# Patient Record
Sex: MALE | Race: WHITE | NOT HISPANIC OR LATINO | Employment: OTHER | URBAN - METROPOLITAN AREA
[De-identification: names, ages, dates, MRNs, and addresses within clinical notes are randomized per-mention and may not be internally consistent; named-entity substitution may affect disease eponyms.]

---

## 2017-08-15 ENCOUNTER — TELEPHONE (OUTPATIENT)
Dept: PHARMACY | Facility: OTHER | Age: 72
End: 2017-08-15

## 2017-08-15 ENCOUNTER — TELEPHONE (OUTPATIENT)
Dept: FAMILY MEDICINE | Facility: CLINIC | Age: 72
End: 2017-08-15

## 2017-08-15 NOTE — TELEPHONE ENCOUNTER
MTM referral from: [facility]     MTM referral outreach attempt #1 on August 15, 2017 at 1:01 PM      Outcome: Patient not reachable contact information is incorrect, will route to MTM Pharmacist/Provider as an FYI. Thank you for the referral.    Diana Viramontes, MTM Coordinator

## 2017-08-18 NOTE — TELEPHONE ENCOUNTER
Aida from [facility] gave us correct patient information unfortunately there medical insurance does not cover MTM.  Diana Viramontes, [facility] Coordinator

## 2018-04-16 ENCOUNTER — TRANSFERRED RECORDS (OUTPATIENT)
Dept: HEALTH INFORMATION MANAGEMENT | Facility: CLINIC | Age: 73
End: 2018-04-16

## 2018-04-24 ENCOUNTER — TRANSFERRED RECORDS (OUTPATIENT)
Dept: HEALTH INFORMATION MANAGEMENT | Facility: CLINIC | Age: 73
End: 2018-04-24

## 2018-04-30 ENCOUNTER — TRANSFERRED RECORDS (OUTPATIENT)
Dept: HEALTH INFORMATION MANAGEMENT | Facility: CLINIC | Age: 73
End: 2018-04-30

## 2018-07-24 ENCOUNTER — HOSPITAL ENCOUNTER (OUTPATIENT)
Dept: OCCUPATIONAL THERAPY | Facility: CLINIC | Age: 73
Setting detail: THERAPIES SERIES
End: 2018-07-24
Attending: PSYCHIATRY & NEUROLOGY
Payer: MEDICARE

## 2018-07-24 PROCEDURE — 97535 SELF CARE MNGMENT TRAINING: CPT | Mod: GO | Performed by: OCCUPATIONAL THERAPIST

## 2018-07-24 PROCEDURE — 97166 OT EVAL MOD COMPLEX 45 MIN: CPT | Mod: GO | Performed by: OCCUPATIONAL THERAPIST

## 2018-07-24 PROCEDURE — G8987 SELF CARE CURRENT STATUS: HCPCS | Mod: GO,CL | Performed by: OCCUPATIONAL THERAPIST

## 2018-07-24 PROCEDURE — 97165 OT EVAL LOW COMPLEX 30 MIN: CPT | Mod: GO | Performed by: OCCUPATIONAL THERAPIST

## 2018-07-24 PROCEDURE — 40000249 ZZH STATISTIC VISIT LOW VISION CLINIC: Performed by: OCCUPATIONAL THERAPIST

## 2018-07-24 PROCEDURE — G8988 SELF CARE GOAL STATUS: HCPCS | Mod: GO,CK | Performed by: OCCUPATIONAL THERAPIST

## 2018-07-24 ASSESSMENT — ACTIVITIES OF DAILY LIVING (ADL)
PRIOR_ADL/IADL_STATUS: IMPAIRED PRIOR TO ONSET
ADL_EQUIPMENT_USED: ADAPTIVE ADL EQUIPMENT;GRAB BAR;TUB/SHOWER CHAIR

## 2018-07-24 ASSESSMENT — VISUAL ACUITY: OS: LIGHT PERCEPTION

## 2018-07-25 NOTE — PROGRESS NOTES
" 07/24/18 1100   Visit Type   Type of Visit Initial       Present No   General Information   Start Of Care Date 07/24/18   Referring Physician Becki Marinelli MD   Orders Evaluate And Treat As Indicated  (for low vision)   Date of Order 04/24/18   Medical Diagnosis Glaucoma, legally blind, physiological tremors, diabetes   Onset Of Illness/injury Or Date Of Surgery 04/24/2018   Surgical/Medical history reviewed (h/o CVA,  diabetes, glaucoma, dizziness, neuropathy, tremors)   Precautions/Limitations history of stroke affected left side, hard of hearing, h/o bypass,  mechanical valve placement, physiological tremor, wheelchair and cane use for ambulation   Additional Occupational Profile Info/Pertinent History of Current Problem has been seen by [facility], - issued flashlight, VI button, large print playing cards,   Prior ADL/IADL status Impaired prior to onset   Others present at visit Spouse/significant other  ([place])   Patient/family Goals Statement reading, seeing distance, pt states he would like to drive again   General information comments Marco Mendoza [phone] [facility]   Social History/Home Environment   Living Environment House/townhome  (main floor living. Has lower level - pt does not need access)   Current Community Support (hires lawn and snow, [facility] \"checks in\" on things)   Patient Role/employment History  Retired  (was )   Avocational \"I sit in my recliner.  I can't really do anything\". 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    Pain Assessment   Pain Reported Yes   Pain Location knee   Pain Scale 8/10  (8)   Comments Pt has been evaluated for knee pain. States he is too high risk for surgical replacement.    Fall Risk Screen   Fall screen " "completed by OT   Have you fallen 2 or more times in the past year? Yes   Have you fallen and had an injury in the past year? No   Is patient a fall risk? Yes   Fall screen comments is currently being seen per PT for dizziness and balance   Cognitive/Behavioral   Communication Intact   Cognitive Status (decreased memory, concentration)   Behavior Appropriate   Patient/family aware of diagnosis Yes   How well do you understand your eye condition? Not well   Vision related restrictions on visiting with family/friends Mild   Reported emotional impact of visual impairment Severe   Cognitive/Behavioral Comment is being treated for depression   Physical Status/Equipment   Physical Status Tremor;Impaired balance   Mobility equipment used Walker   ADL Equipment used Adaptive ADL Equipment;Grab bar;Tub/shower chair   Physical Status/Equipment comments Pt reports he ambulates independently to the bathroom.  He receives assist for bathing, dressing, grooming and eating.  Pt's wife does all cooking. Pt does look for items in refrigerator and has difficulty location items secondary to vision.    Visual Report   Functional Complaints Reading;Writing;Safety in mobility   Visual Complaints Constricted visual fields right eye;Constricted visual fields left eye   Nishant Bonnet Symptoms? Yes   Magnifier (strength and type) does not have   Reading glasses Bifocal   Lighting and Glare   Is your lighting adequate? No/ at home   Is glare a problem? Yes/ outdoors   Visual Acuity   Acuity right eye not available to this therapist. Ophthalmology notes have been requested. Pt reports \"poor\"   Acuity left eye Light Perception    Contrast Sensitivity   Contrast sensitivity (score/25) 14/25   MN Read   Smallest print size read 1.0M   Critical print size 2.0M   Words per minute at critical print size 60   Functional Reading Screen   Reading screen comments SRAVFP not administed secondary to time constraints. Administration of evaluation slowed " "secondary to pt's slowed responses    Dynavision: Evaluation of visual skills/search of extra personal space via 5X4 foot computerized light board with 64 stimuli.  The user reacts as quickly as possible by striking the lights as they turn on in random succession.   Dynavision Cascade (not performed secondary to time constraints)   Education   Learner Patient;Significant Other  (wife Roxane)   Readiness Acceptance   Method Explanation;Demonstration   Response Verbalizes understanding;Needs reinforcement   Education Notes provided pt education in irreversible vision loss caused by Glaucoma. Pt stated \"no one has ever explained that to me before\"   Clinical Impression, OT Eval   Criteria for Skilled Therapeutic Interventions Met yes;treatment indicated   Therapy  Diagnosis: Impaired ADL/IADL with deficits in Reading based ADL;Written communication;Home management;Dressing;Grooming;Eating   Assessment of Occupational Performance 5 or more Performance Deficits   Identified Performance Deficits as above   Clinical Decision Making (Complexity) Moderate complexity   Clinical Impression Comments pt with mulitple co-moribities impact participation, new learning and adoption of new methods   OT Visual Rehabilitation Evaluation Plan   Therapy Plan Occupational therapy intervention   Planned Interventions Visual field loss awareness;Visual skills training for near tasks;Visual skills training for safety in mobility;Visual skills training for location of objects;Low vision compensatory training for reading;Low vision compensatory training for written communication;Low vision compensatory training for object identification;Instruction in environmental adaptations for glare;Instruction in environmental adaptations for contrast;Instruction in environmental adaptations for lighting;Optical device/ADL device instruction and training;Instruction in community resources   Frequency / Duration 5 tx visits over 12 weeks - 1X every 2-3 " weeks for 12 weeks   Risks and Benefits of Treatment have been explained. Yes   Patient, Family in agreement with plan of care Yes   GOALS   Goals Near Vision;Visual Field;Environmental Modification;Resource Education;Distance Viewing   Goals Addressed this Session Near vision;Visual field   Goal 1 - Near Vision   Goal Description: Near Vision Patient will verbalize awareness of visual field Loss and demonstrate improved use of visual skills/adaptive equipment for increased independence in reading-based activities of daily living, written communication and detail ADL tasks.   Target Date 10/16/18   Goal 2 - Visual field   Goal Description: Visual field Patient will verbalize awareness of visual field loss and demonstrate improved use of visual skills for increased safety/ independence in locating objects/obstacles and in navigation   Target Date 10/16/18   Goal 3 - Environmental modification   Goal Description: Environment modification Patient will demonstrate understanding of the impact of lighting, contrast and glare on ADL activities, in conjunction with environmentally-based ADL modifications   Target Date 10/16/18   Goal 4 - Resource education   Goal Description: Resource education Patient will verbalize awareness of community resources for the following:;Access to large print materials;Access to low vision devices   Target Date 10/16/18   Goal 5 - Distance viewing   Goal Description: Distance viewing Patient will demonstrate proficient use of a distance device in conjunction with appropriate visual skills techniques to correctly survey objects in the distance   Target Date 10/16/18   Total Evaluation Time   Total Evaluation Time 50   Therapy Certification   Certification date from 07/25/18   Certification date to 10/16/18   Medical Diagnosis Glaucoma

## 2018-07-25 NOTE — PROGRESS NOTES
[facility]          OUTPATIENT OCCUPATIONALTHERAPY  EVALUATION  PLAN OF TREATMENT FOR OUTPATIENT REHABILITATION  (COMPLETE FOR INITIAL CLAIMS ONLY)  Patient's Last Name, First Name, M.I.  YOB: 1945  CeceChase      Provider's Name  [facility] Medical Record No.  [id number]   Onset Date:  04/24/2018 Start of Care Date:  07/24/18   Type:     ___PT  _X_OT   ___SLP Medical Diagnosis:  Glaucoma   Therapy Diagnosis: Impaired ADL/IADL with deficits in Reading based ADL, Written communication, Home management, Dressing, Grooming, Eating    Visits from SOC: 1     _________________________________________________________________________________  Plan of Treatment/Functional Goals:  Planned Interventions: Visual field loss awareness, Visual skills training for near tasks, Visual skills training for safety in mobility, Visual skills training for location of objects, Low vision compensatory training for reading, Low vision compensatory training for written communication, Low vision compensatory training for object identification, Instruction in environmental adaptations for glare, Instruction in environmental adaptations for contrast, Instruction in environmental adaptations for lighting, Optical device/ADL device instruction and training, Instruction in community resources        Goals  1. Patient will verbalize awareness of visual field Loss and demonstrate improved use of visual skills/adaptive equipment for increased independence in reading-based activities of daily living, written communication and detail ADL tasks.         Target Date: 10/16/18      2. Patient will verbalize awareness of visual field loss and demonstrate improved use of visual skills for increased safety/ independence in locating objects/obstacles and in navigation         Target Date: 10/16/18      3.   Patient will demonstrate understanding of the impact of lighting, contrast and glare on ADL activities, in conjunction with environmentally-based ADL modifications         Target Date: 10/16/18      4. Patient will verbalize awareness of community resources for the following:, Access to large print materials, Access to low vision devices         Target Date: 10/16/18      5. Patient will demonstrate proficient use of a distance device in conjunction with appropriate visual skills techniques to correctly survey objects in the distance          Target Date: 10/16/18      6.                    7.                 8.                            Therapy Frequency/Duration:  5 tx visits over 12 weeks - 1X every 2-3 weeks for 12 weeks    Julia Lorenzo, OT       I CERTIFY THE NEED FOR THESE SERVICES FURNISHED UNDER        THIS PLAN OF TREATMENT AND WHILE UNDER MY CARE .             Physician Signature               Date    X_____________________________________________________                        Certification date from: 07/25/18 Certification date to: 10/16/18          Referring Physician: Becki Marinelli MD     Initial Assessment        See Epic Evaluation Start Of Care Date: 07/24/18     Julia Lorenzo

## 2018-07-31 ENCOUNTER — HOSPITAL ENCOUNTER (OUTPATIENT)
Dept: OCCUPATIONAL THERAPY | Facility: CLINIC | Age: 73
Setting detail: THERAPIES SERIES
End: 2018-07-31
Attending: PSYCHIATRY & NEUROLOGY
Payer: MEDICARE

## 2018-07-31 PROCEDURE — 97535 SELF CARE MNGMENT TRAINING: CPT | Mod: GO | Performed by: OCCUPATIONAL THERAPIST

## 2018-07-31 PROCEDURE — 40000249 ZZH STATISTIC VISIT LOW VISION CLINIC: Performed by: OCCUPATIONAL THERAPIST

## 2018-07-31 PROCEDURE — 97530 THERAPEUTIC ACTIVITIES: CPT | Mod: GO | Performed by: OCCUPATIONAL THERAPIST

## 2018-07-31 NOTE — DISCHARGE INSTRUCTIONS
Visual Rehabilitation Summary  Watching TV:   Close the blinds   Keep the room light low   Move chair closer to TV   Tip your chin to see more clearly  Sunglasses   Light danny fitovers for indoors   Medium danny fitovers for outdoors/car  Lighting   [place] reading light.   13 Watt compact florescent bulb worked well  Keep the room light soft.  Julia Lorenzo, OTR/L      [phone]

## 2018-08-28 ENCOUNTER — HOSPITAL ENCOUNTER (OUTPATIENT)
Dept: OCCUPATIONAL THERAPY | Facility: CLINIC | Age: 73
Setting detail: THERAPIES SERIES
End: 2018-08-28
Attending: PSYCHIATRY & NEUROLOGY
Payer: MEDICARE

## 2018-08-28 PROCEDURE — 40000249 ZZH STATISTIC VISIT LOW VISION CLINIC: Performed by: OCCUPATIONAL THERAPIST

## 2018-08-28 PROCEDURE — 97535 SELF CARE MNGMENT TRAINING: CPT | Mod: GO | Performed by: OCCUPATIONAL THERAPIST

## 2018-09-04 ENCOUNTER — HOSPITAL ENCOUNTER (OUTPATIENT)
Dept: OCCUPATIONAL THERAPY | Facility: CLINIC | Age: 73
Setting detail: THERAPIES SERIES
End: 2018-09-04
Attending: PSYCHIATRY & NEUROLOGY
Payer: MEDICARE

## 2018-09-04 PROCEDURE — 97530 THERAPEUTIC ACTIVITIES: CPT | Mod: GO | Performed by: OCCUPATIONAL THERAPIST

## 2018-09-04 PROCEDURE — G8988 SELF CARE GOAL STATUS: HCPCS | Mod: GO,CK | Performed by: OCCUPATIONAL THERAPIST

## 2018-09-04 PROCEDURE — 40000249 ZZH STATISTIC VISIT LOW VISION CLINIC: Performed by: OCCUPATIONAL THERAPIST

## 2018-09-04 PROCEDURE — G8989 SELF CARE D/C STATUS: HCPCS | Mod: GO,CK | Performed by: OCCUPATIONAL THERAPIST

## 2018-09-04 PROCEDURE — 97535 SELF CARE MNGMENT TRAINING: CPT | Mod: GO,XU | Performed by: OCCUPATIONAL THERAPIST

## 2018-09-04 NOTE — DISCHARGE INSTRUCTIONS
Visual Rehabilitation Summary  1. Light recommendations: page 50, #54757  $79.95  2. Using contrast to see things better  a. Use solid white placemat to place your blue and red dishes on.  b. Avoid pattern as it hides things  c. Use dishes that contrast with the food you are eating  3. Medication management  a. Consider trying a pill box for morning pills  b. Empty pills onto black felt placed in a cake pen. That will contain and increase visibility of pills.  4. Searching your environment  a. Always survey the tabletop before you start eating to locate all the items on the table. This will prevent spills  b. Search a room before entering so you can identify the hazards and obstacles  5. I will write your doctor a letter and ask him to contact you regarding glasses.   Julia Lorenzo, OTR/L  ([phone]

## 2018-09-04 NOTE — PROGRESS NOTES
09/04/18 1400   Signing Clinician's Name / Credentials   Signing clinician's name / credentials Julia Lorenzo, OTR/L   Session Number   Session Number 4   Reporting period 07/31-09/04   Authorization status Authorized.  Plan to discharge this date   Recertification   Recertification Due 10/16/18   Progress Notes   Progress Note Due 07/16/18   OT Medicare Only G-code   G-code Self Care   Self Care   Self Care Goal,  (eval/re-eval, every progress note & discharge) CK: 40-59% impairment   Self Care Discharge Status,  (discharge) CK: 40-59% impairment   Discharge Self Care Modifier Rationale MNRead,Self Report, Clinical Observations   GOALS   Goals Near Vision;Visual Field;Environmental Modification;Resource Education;Distance Viewing   Goals Addressed this Session Near vision;Visual field   Goal 1 - Near Vision   Goal Description: Near Vision Patient will verbalize awareness of visual field Loss and demonstrate improved use of visual skills/adaptive equipment for increased independence in reading-based activities of daily living, written communication and detail ADL tasks.   Near Vision Goal Comment goal met. Pt trialed increased add power over existing bifocal and demonstrated critical print size of 0.8M. Will write referring MD letter and alert MD that pt would like single vision reading glasses refraction. Pt met print size goals with 3x stand illuminated magnifier (0.5M) and head borne visor (0.8M), but identified that he would prefer single vision readers   Target Date 10/16/18   Date Met 09/04/18   Goal 2 - Visual field   Goal Description: Visual field Patient will verbalize awareness of visual field loss and demonstrate improved use of visual skills for increased safety/ independence in locating objects/obstacles and in navigation   Visual Field Goal Comment goal met this date. Instruction provided in visual search for near, intermeidate and distance.Pt and his wife verbalized understanding   Target  Date 10/16/18   Date Met 09/04/18   Goal 3 - Environmental modification   Goal Description: Environment modification Patient will demonstrate understanding of the impact of lighting, contrast and glare on ADL activities, in conjunction with environmentally-based ADL modifications   Environmental Modification Goal Comment Goal met. Identified compact florescent [place] light as optimal for illuminating reading material. Resources issued and pt and his wife verbalized plan to order. identified light danny fitlers as optimal for managing glare in low light, and these were ordered pt report. Identified use of high contrast, solid backgrounds as optimal for medication managment and location of objects in the home. Pt and his wife verbalized plan to utilize white and black solid clothes on their flowered tablecloth to enhance pt's ability to locate objects   Target Date 10/16/18   Date Met 09/04/18   Goal 4 - Resource education   Goal Description: Resource education Patient will verbalize awareness of community resources for the following:;Access to large print materials;Access to low vision devices   Resource Education Goal Comment goal met as written   Target Date 10/16/18   Date Met 09/04/18   Goal 5 - Distance viewing   Goal Description: Distance viewing Patient will demonstrate proficient use of a distance device in conjunction with appropriate visual skills techniques to correctly survey objects in the distance   Distance Viewing Goal Comment Goal partially met. Trialed Eschenbach headborne binoculars for distance viewing without success. Identified that moving closer to TV would be most effective means of increasing clarity, and verbalized plan to implement this strategy.   Target Date 10/16/18   Date Met 09/04/18       Present No   Therapy Diagnosis   Therapy  Diagnosis: Impaired ADL/IADL with deficits in Reading based ADL;Written communication;Home management;Dressing;Grooming;Eating  "  Subjective Report   Subjective Report 'I would like stronger (reading) glasses. I think that would work best reading in my recliner. \"   Visual Rehab Treatment [place]   Daily Treatment [place] Self Care/Home Management;Therapeutic Activity;Therapeutic Activity: General   Self Care/Home Management   Minutes 33   Skilled Intervention Environmental modification, providing visual contrast to obstacles;Environmental modification, contrast strategies for eating;Organizational strategies incorporating contrast   Patient Response verbalized good understanding   Treatment Detail Utilizing contrast for location of objects: pt demonstrated ability to locate small targets against solid, high contrast backgrounds. Required assist to locate same targets agaisnt patterned background. Provided pt and family training in creating high contrast backgrounds for location of objects. Pt identified that placing white or black placemats on currently floral tablecloth would increase his independence in medication management and in eating (without spilling/knocking items over). Written summary of recommendations issued   Therapeutic Activity   Minutes 25   Therapeutic Activity: General   Skilled Intervention Visual search strategies for intermediate space;Visual skill training for increased safety in navigation and search of extra personal space   Patient Response verbalized good understanding   Treatment Detail Provided training in visually searching environments before interacting.  Identiied optimal search pattern for extra personal vs. intermeidate (table top) spaces. Pt and his wife verbalized good understanidng   MN Read   Smallest print size read 0.8M with +2.0 over current +2.75 for continous text reading   Equipment/Resources   Written resources provided (summary of all recommendations this date)   Assessments Completed   Assessments Completed MNRead   Education   Learner Patient;Significant   Readiness Eager;Acceptance   Method " Explanation;Demonstration   Response Verbalizes understanding;Needs reinforcement   Education Notes pt's wife Sonja present throughout - available to pt for reinforcment   Communication with other professionals   Communication with other professionals letter faxed to referring MD regarding pt's wish for refraction for single vision reading glasses   Plan   Plan for next session discharge

## 2018-09-13 ENCOUNTER — PRE VISIT (OUTPATIENT)
Dept: ORTHOPEDICS | Facility: CLINIC | Age: 73
End: 2018-09-13

## 2018-09-13 DIAGNOSIS — M25.561 RIGHT KNEE PAIN, UNSPECIFIED CHRONICITY: Primary | ICD-10-CM

## 2018-09-13 NOTE — TELEPHONE ENCOUNTER
Discussed with patient reason for visit Right knee pain  Patient prepared for appointment through the followin. Have you had any recent xrays in the last 6 months? No -  Patient informed that they will have x-rays done before appointment and to arrive at least 30 minutes before MD appointment. Xrays ordered per standing orders.    2. Have you had an MRI? No.     3. Have you had any surgery in past related to complaint?  No.  If yes, Patient advised that implant stickers are needed for any previous total joint surgery as well for appointment.     4. Are you being referred by another provider? No  If yes-Records in [facility]. O records being sent, no imaging    5. Have you received the intake form in mail?.No.   Pt reminded if intake has not been received before appointment to arrive an additional 20 minutes early to appointment.  Mailed     6. Is this work comp or MVA related? No.    Jenn Alexandra RN

## 2018-09-18 ENCOUNTER — RADIANT APPOINTMENT (OUTPATIENT)
Dept: GENERAL RADIOLOGY | Facility: CLINIC | Age: 73
End: 2018-09-18
Attending: ORTHOPAEDIC SURGERY
Payer: MEDICARE

## 2018-09-18 ENCOUNTER — TELEPHONE (OUTPATIENT)
Dept: ORTHOPEDICS | Facility: CLINIC | Age: 73
End: 2018-09-18

## 2018-09-18 ENCOUNTER — OFFICE VISIT (OUTPATIENT)
Dept: ORTHOPEDICS | Facility: CLINIC | Age: 73
End: 2018-09-18
Payer: MEDICARE

## 2018-09-18 VITALS
OXYGEN SATURATION: 94 % | HEIGHT: 68 IN | SYSTOLIC BLOOD PRESSURE: 126 MMHG | BODY MASS INDEX: 33.69 KG/M2 | WEIGHT: 222.3 LBS | HEART RATE: 63 BPM | DIASTOLIC BLOOD PRESSURE: 73 MMHG

## 2018-09-18 DIAGNOSIS — M25.561 RIGHT KNEE PAIN, UNSPECIFIED CHRONICITY: ICD-10-CM

## 2018-09-18 DIAGNOSIS — G89.29 CHRONIC PAIN OF RIGHT KNEE: Primary | ICD-10-CM

## 2018-09-18 DIAGNOSIS — M25.561 CHRONIC PAIN OF RIGHT KNEE: Primary | ICD-10-CM

## 2018-09-18 PROCEDURE — 99204 OFFICE O/P NEW MOD 45 MIN: CPT | Performed by: ORTHOPAEDIC SURGERY

## 2018-09-18 PROCEDURE — 73562 X-RAY EXAM OF KNEE 3: CPT | Mod: RT | Performed by: RADIOLOGY

## 2018-09-18 RX ORDER — NAPROXEN SODIUM 220 MG
TABLET ORAL
COMMUNITY
Start: 2017-11-27

## 2018-09-18 RX ORDER — NITROGLYCERIN 0.4 MG/1
TABLET SUBLINGUAL EVERY 5 MIN PRN
Status: ON HOLD | COMMUNITY
Start: 2018-04-02 | End: 2019-01-07

## 2018-09-18 RX ORDER — KETOCONAZOLE 20 MG/G
CREAM TOPICAL
Status: ON HOLD | COMMUNITY
Start: 2018-07-30 | End: 2019-01-07

## 2018-09-18 RX ORDER — WARFARIN SODIUM 5 MG/1
TABLET ORAL
Status: ON HOLD | COMMUNITY
Start: 2018-07-02 | End: 2019-01-07

## 2018-09-18 RX ORDER — INSULIN PUMP SYRINGE, 3 ML
EACH MISCELLANEOUS
COMMUNITY
Start: 2017-08-22

## 2018-09-18 RX ORDER — LIRAGLUTIDE 6 MG/ML
1.8 INJECTION SUBCUTANEOUS
Status: ON HOLD | COMMUNITY
Start: 2017-04-21 | End: 2019-01-07

## 2018-09-18 RX ORDER — ROSUVASTATIN CALCIUM 40 MG/1
40 TABLET, COATED ORAL AT BEDTIME
Status: ON HOLD | COMMUNITY
Start: 2018-07-13 | End: 2019-01-07

## 2018-09-18 RX ORDER — CLONAZEPAM 1 MG/1
1 TABLET ORAL AT BEDTIME
Status: ON HOLD | COMMUNITY
Start: 2018-05-16 | End: 2019-01-07

## 2018-09-18 RX ORDER — METOPROLOL TARTRATE 25 MG/1
TABLET, FILM COATED ORAL DAILY
Status: ON HOLD | COMMUNITY
Start: 2018-07-30 | End: 2019-01-07

## 2018-09-18 RX ORDER — CYCLOSPORINE 0.5 MG/ML
EMULSION OPHTHALMIC
Status: ON HOLD | COMMUNITY
Start: 2017-04-28 | End: 2019-01-07

## 2018-09-18 RX ORDER — ASPIRIN 81 MG/1
TABLET, CHEWABLE ORAL
Status: ON HOLD | COMMUNITY
Start: 2005-11-02 | End: 2019-01-07

## 2018-09-18 RX ORDER — HALOBETASOL PROPIONATE 0.05 %
OINTMENT (GRAM) TOPICAL
Status: ON HOLD | COMMUNITY
Start: 2018-04-11 | End: 2019-01-07

## 2018-09-18 RX ORDER — LISINOPRIL 5 MG/1
TABLET ORAL
Status: ON HOLD | COMMUNITY
Start: 2018-07-30 | End: 2019-01-07

## 2018-09-18 RX ORDER — CITALOPRAM HYDROBROMIDE 40 MG/1
TABLET ORAL DAILY
Status: ON HOLD | COMMUNITY
Start: 2018-09-14 | End: 2019-01-07

## 2018-09-18 ASSESSMENT — PAIN SCALES - GENERAL: PAINLEVEL: EXTREME PAIN (8)

## 2018-09-18 NOTE — PROGRESS NOTES
Chief Complaint: Consult ( Right Knee Pain/Issues  **XR not needed per Dr. Cruz)    Physician:  No ref. provider found    HPI: Chase Zhao is a 73 year old male who presents today for evaluation of her right knee. He reports that long term increasingly debilitating symptoms despite comprehensive non-operative management. He presents today to discuss treatment options. He has questions about total knee.     MEDICAL HISTORY: History reviewed. No pertinent past medical history.    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 Discussed risk factors.    Medications:     Current Outpatient Prescriptions:      Acetaminophen (TYLENOL PO), Take 650 mg by mouth, Disp: , Rfl:      aspirin 81 MG chewable tablet, , Disp: , Rfl:      blood glucose monitoring ([employer] TEST STRIPS) test strip, Dispense test strips covered by the patient insurance. Test 4 times per day, before meals and at bedtime., Disp: , Rfl:      Blood Glucose Monitoring Suppl (FIFTY50 GLUCOSE METER 2.0) w/Device KIT, Dispense meter, test strips, lancets covered by pt ins. E11.65 IDDM type II, uncontrolled - Test 6 times/day. Reason:  "Unstable diabetes, Disp: , Rfl:      Cholecalciferol (VITAMIN D3) 1000 units CAPS, Take 1 capsule by mouth, Disp: , Rfl:      citalopram (CELEXA) 40 MG tablet, , Disp: , Rfl:      clonazePAM (KLONOPIN) 1 MG tablet, , Disp: , Rfl:      cycloSPORINE (RESTASIS) 0.05 % ophthalmic emulsion, , Disp: , Rfl:      halobetasol (ULTRAVATE) 0.05 % ointment, , Disp: , Rfl:      Insulin Syringe 30G X 5/16\" 1 ML MISC, Dispense item covered by patient insurance. For administering insulin at home., Disp: , Rfl:      JANTOVEN 5 MG tablet, , Disp: , Rfl:      ketoconazole (NIZORAL) 2 % cream, , Disp: , Rfl:      liraglutide (VICTOZA PEN) 18 MG/3ML soln, Inject 1.8 mg Subcutaneous, Disp: , Rfl:      lisinopril (PRINIVIL/ZESTRIL) 5 MG tablet, , Disp: , Rfl:      metoprolol tartrate (LOPRESSOR) 25 MG tablet, , Disp: , Rfl:      nitroGLYcerin (NITROSTAT) 0.4 MG sublingual tablet, , Disp: , Rfl:      NOVOFINE 30G X 8 MM insulin pen needle, , Disp: , Rfl:      omeprazole (PRILOSEC) 20 MG CR capsule, , Disp: , Rfl:      rosuvastatin (CRESTOR) 40 MG tablet, Take 40 mg by mouth, Disp: , Rfl:     Allergies: Review of patient's allergies indicates not on file.    SURGICAL HISTORY: History reviewed. No pertinent surgical history.    FAMILY HISTORY: History reviewed. No pertinent family history.    SOCIAL HISTORY:   Social History   Substance Use Topics     Smoking status: Never Smoker     Smokeless tobacco: Never Used     Alcohol use Not on file       REVIEW OF SYSTEMS:  The comprehensive review of systems from the intake form was reviewed with the patient.  No fever, weight change or fatigue. No dry eyes. No oral ulcers, sore throat or voice change. No palpitations, syncope, angina or edema.  No chest pain, excessive sleepiness, shortness of breath or hemoptysis.   No abdominal pain, nausea, vomiting, diarrhea or heartburn.  No skin rash. No focal weakness or numbness. No bleeding or lymphadenopathy. No rhinitis or hives.     Exam:  On physical " "examination the patient appears the stated age, is in no acute distress, affectThe is appropriate, and breathing is non-labored.  Vitals are documented in the EMR and have been reviewed:    /73  Pulse 63  Ht 1.73 m (5' 8.11\")  Wt 100.8 kg (222 lb 4.8 oz)  SpO2 94%  BMI 33.69 kg/m2  5' 8.11\"  Body mass index is 33.69 kg/(m^2).    Right knee is benign in appearance without gross effusion. ROM is notable for an approximately 25 degree flexion contracture and and additional 90 degrees of flexion. Collateral ligaments intact.   Distally, the circulatory, motor, and sensation exam is intact with 5/5 EHL, gastroc-soleus, and tibialis anterior.  Sensation to light touch is intact.  Dorsalis pedis and posterior tibialis pulses are not palpable but the foot is warm and well perfused.  There are no sores on the feet, no bruising, and no lymphedema.    X-rays:   Final Report   Exam: 3 views of the right knee dated 9/18/2018.    COMPARISON: None.    CLINICAL HISTORY: Right knee pain.    FINDINGS: AP, lateral, and axial views of the right knee were  obtained. Joint space narrowing in the medial femorotibial joint  compartment of the right knee joint. No large right knee joint  effusion. Tricompartmental osteophytic spurring. Bony fragmentation  along the lateral aspect of the patella. Slight lateral patellar  subluxation.    IMPRESSION: Osteoarthrosis in the right knee, greatest in the medial  femorotibial joint compartment.    SHAKA HALE MD    Assessment: This is a 73 year old with advanced right knee osteoarthritis associated with severe symptoms. We discussed the treatment options including living with it and total knee. We spent approximately 20 minutes discussing the risks and benefits of total knee arthroplasty.  We reviewed the proposed surgical plan.  The discussion included but was not limited to the following:  Blood clots, blood clots to the lungs, injury to blood vessels and nerves, the literature as " it pertains to known problems with leg length discrepancy, stiffness, anterior knee pain, and infection. We discussed the post-operative recovery for the typical patient, return to driving, and return to normal activities.  All the patient's questions were answered to the best of my ability and would like to proceed.    Plan:  Right total knee

## 2018-09-18 NOTE — NURSING NOTE
"Chase Zhao's chief complaint for this visit includes:  Chief Complaint   Patient presents with     Consult      Right Knee Pain/Issues  **XR not needed per Dr. Cruz     PCP: Madhu Miller    Referring Provider:  No referring provider defined for this encounter.    /73  Pulse 63  Ht 1.73 m (5' 8.11\")  Wt 100.8 kg (222 lb 4.8 oz)  SpO2 94%  BMI 33.69 kg/m2  Extreme Pain (8)     Do you need any medication refills at today's visit? No        "

## 2018-09-18 NOTE — PATIENT INSTRUCTIONS
[facility]  [address] [place] [id number]  Phone [phone]  Fax [phone]    SURGICAL INFORMATION & INSTRUCTIONS  Dr. Marco Cruz  Name of Surgery: Right total knee arthroplasty    Date of Surgery: 1/2/19    If you need to reschedule/schedule your surgery, please contact Jocelynn, our surgery scheduler at [place], at [phone].    Arrival Time: 8:40am     Time of Surgery:  10:40am    Please arrive early so that we can prepare you for surgery. If you arrive later than your scheduled arrival time, your surgery may be cancelled.  Please note that scheduled times may change, but you will always be notified if there is a change.     Location of Surgery:     ? [facility], [facility]  [address] [place], [place] [address] [place] [address], 3rd floor for check-in  Phone [phone]  Fax [phone]  Bring parking ticket with you for validation and reduced parking rate  [email or web address]    Prior to surgery    ? Call your insurance company  Ask if you need pre-approval for your surgery.  If pre-approval is needed, please call our surgery scheduler for assistance with the pre-approval process.   If you do not have insurance, please let us know.     ? [place] for Surgery (if on [facility])  10 days before surgery, call [phone] to register with the surgery center. Have your insurance card ready.     Total Joint Replacement:  - Joint Replacement Class:  If you are scheduled to have a joint replacement, you (and any family/friends that will be helping to care for you) are expected to attend an educational class at least two weeks prior to your surgery.  To register for the class please call the Patient Learning Center at [phone] or register online at [email or web address]. Classes are held at multiple locations as well as online.  You must know the date of your surgery before you can  register for a class (approximate date OK). If registering online, please select hip or knee as surgery type as shoulder has not been made an option at this time.     o This is also a good opportunity to think about where you would like to have physical therapy after your surgery. You may also be able to set up appointments in advance so that everything is ready to go after surgery.    - Antibiotics Prophylaxis:  Certain procedures such as dental cleaning/work, colonoscopies and other surgeries can cause bacteria to enter the bloodstream.  These bacteria can attach to joint replacement devices.  To reduce this risk, you will need to take antibiotics before you have invasive procedures or dental work.  This is known as antibiotic prophylaxis.    - Dental Visit:  You may want to schedule an appointment with your dentist for a cleaning or needed work before your surgery.  We advise no dental work or cleaning until 6 months after your surgery with implants to hip(s), knee(s), or shoulder(s).  Once you are 6 months past your surgery, you will need to take prophylactic (preventative) antibiotics for the rest of your life before having any dental cleaning or work.  If dental work is needed before surgery, make sure everything is completely healed prior to surgery.  It may cause delays or cancellation of surgery if not healed completely. This is also for any other invasive procedures you may have such as a colonoscopy.  Please notify the clinic if you have any questions.    ? Schedule an appointment with a Primary Care Provider for a Pre-Op Physical.  This should be done within 30 days of surgery  If you do not have a primary care provider, you may call [facility] at [phone], for an appointment.  Please have your office note and any labs or tests faxed to the facility where you are having surgery. Please be sure to request a copy of your pre-op physical and bring it with you on the day of surgery.      Tell  your provider if you have any of the following:   - IF you have a pacemaker or an ICD (implanted cardiac defibrillator). If you do, please bring the ID card with you on the day of surgery  - IF you're a smoker. People who smoke have a higher risk of infection after surgery. Ask your provider how you can quit smoking.  - If you have diabetes, work with your provider to control your blood sugar. If its not well-controlled, we may need to delay surgery (or you may have problems healing afterward).  - If your surgeon asks you to see your dentist: You'll need to complete any dental work before surgery. Your dentist must send a letter to your surgery center saying it's ok to do the surgery.    ? Blood Bank Pre-Admission order (total joint replacement only):    You will need to have a Blood Type and Screen drawn at a [facility] location before your surgery.  Please check your form included in your packet to determine if it needs to be done 1-30 days before surgery or 1-3 days before surgery.    ? Pre-Op Phone Call  You will receive a pre-op phone call 1-3 days before surgery to review your eating and drinking restrictions, review medications, and confirm surgery times.      ? 7-10 days BEFORE surgery  ? Stop taking aspirin, Plavix or aspirin products 10 days before surgery or as directed by your doctor.  ? Stop taking non-steroidal anti-inflammatory medications (naproxen/Aleve, ibuprofen/Advil/Motrin, celecoxib/Celebrex, meloxicam/Mobic) 1 week before surgery or as directed by your surgeon.  This will reduce the risk of bleeding during surgery.  ? Stop taking fish oil (Omega-3-fatty acid) 1 week before surgery.  ? It is OK to take acetaminophen (Tylenol) up until 2 hours prior to surgery.  ? Take prescription medications as directed by your doctor.  Discuss which medications to take or hold prior to your surgery, with your primary care doctor.   ? If you have diabetes, ask your primary care doctor or  endocrinologist how you should take your medication(s).    ? 24 hours BEFORE surgery  Stop drinking alcohol (beer, wine, liquor) at least 24-hours before and after your surgery.     ? Evening BEFORE surgery  - You may eat a normal meal the night before surgery, but eat nothing after midnight.     - Take a shower - to help wash away bacteria (germs) from your skin.  It s normal to have bacteria on your skin and skin protects us from these germs.  When you have surgery, we cut the skin.  Sometimes germs get into the cuts and cause infection (illness caused by germs).  By following the showering instructions and using the special soap, you will lower the number of germs on your skin.  This decreases your chance of an infection.    - Buy or get 8 ounces of antiseptic surgical soap called 4% CHG.  Common brands of this soap are Hibiclens and Exidine.    - You can find it this soap at your local pharmacy, clinic or retail store.  If you have trouble finding it, ask your pharmacist to help you find the right substitute.    - Do not shave within 12 inches of your incision (surgical cut) area for at least 3 days before surgery.  Shaving can make small cuts in the skin. This puts you at a higher risk of infection.    Items you will need for each shower:   - Newly washed towel  - 4 ounces of one of the recommended soaps    Follow these instructions, the evening before surgery  - Wash your hair and body with your regular shampoo and soap. Make sure you rinse the shampoo and soap from your hair and body.  - Using clean hands, apply about 2 ounces of soap gently on your skin from the neck to your toes. Use on your groin area last. Do not use this soap on your face or head. If you get any soap in your eyes, ears or mouth, rinse right away.  - Once the soap has been on your skin for at least 1 minute, rinse off completely and repeat washing with the surgical soap one more time.  - Rinse well and dry off using a clean towel.  If you  feel any tingling, itching or other irritation, rinse right away. It is normal to feel some coolness on the skin after using the antiseptic soap. Your skin may feel a bit dry after the shower, but do not use any lotions, creams or moisturizers. Do not use hair spray or other products in your hair.  - Dress in freshly washed clothes or pajamas. Use fresh pillowcases and sheets on your bed.    ? Day of Surgery  - You may drink clear liquids from midnight up to 2 hours before surgery or as directed on your pre-op phone call.  Clear liquids include: Water, Pedialyte, Gatorade, apple juice and liquids you can read through. Please avoid liquids that are red or orange in color.   - Do NOT drink: milk, liquids that have pulp, orange juice, and lemonade or tomato juice.   - Do NOT chew gum, chew tobacco or suck on hard candy.    - Take another shower          Follow these instructions:      - Wash your hair and body with your regular shampoo and soap. Make sure you rinse the shampoo and soap from your hair and body.  - Using clean hands, apply about 2 ounces of soap gently on your skin from the neck to your toes. Use on your groin area last. Do not use this soap on your face or head. If you get any soap in your eyes, ears or mouth, rinse right away.  - Once the soap has been on your skin for at least 1 minute, rinse off completely and repeat washing with the surgical soap one more time.  - Rinse well and dry off using a clean towel.  If you feel any tingling, itching or other irritation, rinse right away. It is normal to feel some coolness on the skin after using the antiseptic soap. Your skin may feel a bit dry after the shower, but do not use any lotions, creams or moisturizers. Do not use hair spray or other products in your hair.  - Dress in freshly washed clothes.  - Do not wear deodorant, cologne, lotion, makeup, nail polish or jewelry to surgery.    ? If there is any change in your health PRIOR to your surgery, please  contact your surgeon's office.  Such as a fever, body aches, fatigue, any flu-like symptoms, rash, or any new injury to related body part.    ? Arrange for someone to drive you home after discharge.    will need to be a responsible adult (18 years or older) that will provide transportation to and from surgery and stay in the waiting room during your surgery. You may not drive yourself or take public transportation to and from surgery.    ? Arrange for someone to stay with you for 24 hours after you go home.   This person must be a responsible adult (18 years or older).    ? Bring these items to the hospital/surgery center:   ? Insurance card(s)  ? Money for parking and co-pays, if needed  ? A list of all the medications you take, including vitamins, minerals, herbs and over the counter medications.    ? A copy of your Advance Health Care Directive, if you have one.  This tells us what treatment(s) you would or would not want, and who would make health care decisions, if you could no longer speak for yourself.    ? A case for glasses, contact lenses, hearing aids or dentures.   ? Your inhaler or CPAP machine, if you use these at home    ? Leave extra cash, jewelry and other valuables at home.         When you arrive for surgery  When you get to the surgery center/hospital, you will:  - Check in. If you are under age 18, you must be with a parent or legal guardian.  - Sign consent forms, if you haven t already. These forms state that you know the risks and benefits of surgery. When you sign the forms, you give us permission to do the surgery. Do not sign them unless you understand what will happen during and after your surgery. If you have any questions about your surgery, ask to speak with your doctor before you sign the forms. If you don t understand the answers, ask again.  - Receive a copy of the Patient s Bill of Rights. If you do not receive a copy, please ask for one.  - Change into hospital clothes. Your  belongings will be placed in a bag. We will return them to you after surgery.  - Meet with the anesthesia provider. He or she will tell you what kind of anesthesia (medicine) will be used to keep you comfortable during surgery.  - Remember: it s okay to remind doctors and nurses to wash their hands before touching you.  - In most cases, your surgeon will use a marker to write his or her initials on the surgery site. This ensures that the exact site is operated on.  - For safety reasons, we will ask you the same questions many times. For example, we may ask your name and birth date over and over again.  - Friends and family can stay with you until it s time for surgery. While you re in surgery, they will be in the waiting area. Please note that cell phones are not allowed in some patient care areas.  - If you have questions about what will happen in the operating room, talk to your care team.  - You will meet with an anesthesiologist, before your surgery.  He or she may reference types of anesthesia commonly used for surgeries:   o General:  This involves the use of an IV for injection of medication and anesthesia. You are put into a sleep and have a breathing tube to assist you with breathing.  o Sedation:  You are asleep, but not so deply that you need a breathing tube.   o Local or Regional: a nerve is injected to numb the surgical area.  o Spinal: you are numbed from the waist down with medicine injected into your back.  o Femoral Nerve Block:  Anesthesia injected into the groin of leg which you are having surgery on.      After surgery  We will move you to a recovery room, where we will watch you closely. If you have any pain or discomfort, tell your nurse. He or she will try to make you comfortable.    - We will move you to your hospital room after you are awake and stable. If you having any pain or discomfort, please let your nursing staff know.  - Please wash your hands every time you touch the wound or  change bandages or dressings.  - Do not submerge the wound in water for 3 months after surgery.  You may not use a bathtub, hot tub, pool, or lake. Showering is ok. Any open area can be a source of incoming bacteria, which can lead to infection. Keep all dressings clean and dry.   - Elevate your leg(s) as much as possible to help reduce swelling. You will also receive compression stockings for the surgical leg. Please wear these during the day and fully remove them at night. Continue to wear these for approximately 4 weeks after surgery or until your swelling has resolved.  - You may put ice on the surgical area for comfort, keeping ice on area for up to 20 minutes then off for 20 minutes.  You may do this the first few days after surgery to help reduce pain and swelling.    - Drink at least 8-10 glasses of liquid each day to help your body heal.  - Keep your lungs clear by coughing and taking deep breaths every couple hours.  This is especially important the first 48 hours after surgery.  - Typically, you will be able to start driving once you are fully off narcotics and able to do a the quick stop test (slamming on the brake) with your right leg without experiencing much pain.  - You will start physical therapy while in the hospital. Once you leave the hospital, you will need to continue going to physical therapy to maintain and improve your range of motion and strength. Please call the physical therapy clinic of your choice to set up an appointment within 1 week of being discharged from the hospital.    - Notify your doctor if you have any of the following:   o Fever of 101 F or higher  o Numbness and/or tingling  o Increased pain, redness or swelling  o Drainage from wound  o Prolonged or uncontrolled bleeding  o Difficulty with movement     Follow-up Appointments, in Clinic  If you don't already have an appointment scheduled, please call to set up an appointment at [phone].      ? Nurse Only Appointment  (2 weeks post op)  ? Post-Op appointments with provider (6 weeks post op)    Dealing with pain  A nurse will check your comfort level often during your stay. He or she will work with you to manage your pain.  It s expected that you will have pain after surgery.  Our goal is to reduce or minimize pain by:   - Remember pain is real. There are many ways to control pain. We can help you decide what works best for you.  - Ask for pain medicine when you need it.  Don t try to  tough it out --this can make you feel worse. Always take your medicine as ordered.  - Medicine doesn t work the same for everyone. If your medicine isn t working, tell your nurse. There may be other medicines or treatments we can try.  - Medication Refills.  If you need refills on your pain medication, please call the clinic as soon as possible.  It may take 72-business hours to obtain a refill.  Refills must be picked up at check-in 2, [facility] or mailed to a pharmacy of your choice.    - It is expected that you will wean off the pain medications in a timely manner.   - Constipation is a common side effect of pain medication, decreased activity and anesthesia from surgery.  Take a stool softener as prescribed by your doctor at the time of discharge.  You may also use over the counter medications as needed.  Be sure to increase your fiber (fruits and vegetables) and your water intake.      Please call the clinic at [phone], if you experience any problems or have questions.  If you are having an emergency, always call 221 or seek immediate evaluation at the Emergency Room.    Thank you for selecting the [facility] for your care!  ---------------------------------------------        Thanks for coming today.  [facility]tho[facility] Medicine Clinic  [address] [place] [address]    To schedule future appointments in Ortho Clinic, you may call [phone].    To schedule ordered imaging by your provider:    Call Central [facility] [phone]    To schedule an injection ordered by your provider:  Call [facility] Injection scheduling line: [phone]  MyChart available online at:  [email or web address]    Please call if any further questions or concerns ([phone]).  Clinic hours 8 am to 5 pm.    Return to clinic (call) if symptoms worsen or fail to improve.

## 2018-09-18 NOTE — MR AVS SNAPSHOT
After Visit Summary   9/18/2018    Chase Zhao    MRN: [id number]           Patient Information     Date Of Birth          1945        Visit Information        Provider Department      9/18/2018 11:00 AM Marco Cruz MD [facility]        Today's Diagnoses     Chronic pain of right knee    -  1      Care Instructions      [facility]  [address] [place] [address]  Phone [phone]  Fax [phone]    SURGICAL INFORMATION & INSTRUCTIONS  Dr. Marco Cruz  Name of Surgery: Right total knee arthroplasty    Date of Surgery: 1/2/19    If you need to reschedule/schedule your surgery, please contact Jocelynn, our surgery scheduler at [place], at [phone].    Arrival Time: 8:40am     Time of Surgery:  10:40am    Please arrive early so that we can prepare you for surgery. If you arrive later than your scheduled arrival time, your surgery may be cancelled.  Please note that scheduled times may change, but you will always be notified if there is a change.     Location of Surgery:     ? [facility] [place] [facility], [address], 3rd floor for check-in  Phone [phone]  Fax [phone]  Bring parking ticket with you for validation and reduced parking rate  [email or web address]    Prior to surgery    ? Call your insurance company  Ask if you need pre-approval for your surgery.  If pre-approval is needed, please call our surgery scheduler for assistance with the pre-approval process.   If you do not have insurance, please let us know.     ? Jber for Surgery (if on [facility])  10 days before surgery, call [phone] to register with the surgery center. Have your insurance card ready.     Total Joint Replacement:  - Joint Replacement Class:  If you are scheduled to have a joint replacement, you (and any family/friends that will  be helping to care for you) are expected to attend an educational class at least two weeks prior to your surgery.  To register for the class please call the Patient Learning Center at [phone] or register online at [email or web address]. Classes are held at multiple locations as well as online.  You must know the date of your surgery before you can register for a class (approximate date OK). If registering online, please select hip or knee as surgery type as shoulder has not been made an option at this time.     o This is also a good opportunity to think about where you would like to have physical therapy after your surgery. You may also be able to set up appointments in advance so that everything is ready to go after surgery.    - Antibiotics Prophylaxis:  Certain procedures such as dental cleaning/work, colonoscopies and other surgeries can cause bacteria to enter the bloodstream.  These bacteria can attach to joint replacement devices.  To reduce this risk, you will need to take antibiotics before you have invasive procedures or dental work.  This is known as antibiotic prophylaxis.    - Dental Visit:  You may want to schedule an appointment with your dentist for a cleaning or needed work before your surgery.  We advise no dental work or cleaning until 6 months after your surgery with implants to hip(s), knee(s), or shoulder(s).  Once you are 6 months past your surgery, you will need to take prophylactic (preventative) antibiotics for the rest of your life before having any dental cleaning or work.  If dental work is needed before surgery, make sure everything is completely healed prior to surgery.  It may cause delays or cancellation of surgery if not healed completely. This is also for any other invasive procedures you may have such as a colonoscopy.  Please notify the clinic if you have any questions.    ? Schedule an appointment with a Primary Care Provider for a Pre-Op Physical.  This should be  done within 30 days of surgery  If you do not have a primary care provider, you may call [facility] at [phone], for an appointment.  Please have your office note and any labs or tests faxed to the facility where you are having surgery. Please be sure to request a copy of your pre-op physical and bring it with you on the day of surgery.      Tell your provider if you have any of the following:   - IF you have a pacemaker or an ICD (implanted cardiac defibrillator). If you do, please bring the ID card with you on the day of surgery  - IF you're a smoker. People who smoke have a higher risk of infection after surgery. Ask your provider how you can quit smoking.  - If you have diabetes, work with your provider to control your blood sugar. If its not well-controlled, we may need to delay surgery (or you may have problems healing afterward).  - If your surgeon asks you to see your dentist: You'll need to complete any dental work before surgery. Your dentist must send a letter to your surgery center saying it's ok to do the surgery.    ? Blood Bank Pre-Admission order (total joint replacement only):    You will need to have a Blood Type and Screen drawn at a [facility] or [facility] location before your surgery.  Please check your form included in your packet to determine if it needs to be done 1-30 days before surgery or 1-3 days before surgery.    ? Pre-Op Phone Call  You will receive a pre-op phone call 1-3 days before surgery to review your eating and drinking restrictions, review medications, and confirm surgery times.      ? 7-10 days BEFORE surgery  ? Stop taking aspirin, Plavix or aspirin products 10 days before surgery or as directed by your doctor.  ? Stop taking non-steroidal anti-inflammatory medications (naproxen/Aleve, ibuprofen/Advil/Motrin, celecoxib/Celebrex, meloxicam/Mobic) 1 week before surgery or as directed by your surgeon.  This will reduce the risk of bleeding during surgery.  ? Stop  taking fish oil (Omega-3-fatty acid) 1 week before surgery.  ? It is OK to take acetaminophen (Tylenol) up until 2 hours prior to surgery.  ? Take prescription medications as directed by your doctor.  Discuss which medications to take or hold prior to your surgery, with your primary care doctor.   ? If you have diabetes, ask your primary care doctor or endocrinologist how you should take your medication(s).    ? 24 hours BEFORE surgery  Stop drinking alcohol (beer, wine, liquor) at least 24-hours before and after your surgery.     ? Evening BEFORE surgery  - You may eat a normal meal the night before surgery, but eat nothing after midnight.     - Take a shower - to help wash away bacteria (germs) from your skin.  It s normal to have bacteria on your skin and skin protects us from these germs.  When you have surgery, we cut the skin.  Sometimes germs get into the cuts and cause infection (illness caused by germs).  By following the showering instructions and using the special soap, you will lower the number of germs on your skin.  This decreases your chance of an infection.    - Buy or get 8 ounces of antiseptic surgical soap called 4% CHG.  Common brands of this soap are Hibiclens and Exidine.    - You can find it this soap at your local pharmacy, clinic or retail store.  If you have trouble finding it, ask your pharmacist to help you find the right substitute.    - Do not shave within 12 inches of your incision (surgical cut) area for at least 3 days before surgery.  Shaving can make small cuts in the skin. This puts you at a higher risk of infection.    Items you will need for each shower:   - Newly washed towel  - 4 ounces of one of the recommended soaps    Follow these instructions, the evening before surgery  - Wash your hair and body with your regular shampoo and soap. Make sure you rinse the shampoo and soap from your hair and body.  - Using clean hands, apply about 2 ounces of soap gently on your skin from  the neck to your toes. Use on your groin area last. Do not use this soap on your face or head. If you get any soap in your eyes, ears or mouth, rinse right away.  - Once the soap has been on your skin for at least 1 minute, rinse off completely and repeat washing with the surgical soap one more time.  - Rinse well and dry off using a clean towel.  If you feel any tingling, itching or other irritation, rinse right away. It is normal to feel some coolness on the skin after using the antiseptic soap. Your skin may feel a bit dry after the shower, but do not use any lotions, creams or moisturizers. Do not use hair spray or other products in your hair.  - Dress in freshly washed clothes or pajamas. Use fresh pillowcases and sheets on your bed.    ? Day of Surgery  - You may drink clear liquids from midnight up to 2 hours before surgery or as directed on your pre-op phone call.  Clear liquids include: Water, Pedialyte, Gatorade, apple juice and liquids you can read through. Please avoid liquids that are red or orange in color.   - Do NOT drink: milk, liquids that have pulp, orange juice, and lemonade or tomato juice.   - Do NOT chew gum, chew tobacco or suck on hard candy.    - Take another shower          Follow these instructions:      - Wash your hair and body with your regular shampoo and soap. Make sure you rinse the shampoo and soap from your hair and body.  - Using clean hands, apply about 2 ounces of soap gently on your skin from the neck to your toes. Use on your groin area last. Do not use this soap on your face or head. If you get any soap in your eyes, ears or mouth, rinse right away.  - Once the soap has been on your skin for at least 1 minute, rinse off completely and repeat washing with the surgical soap one more time.  - Rinse well and dry off using a clean towel.  If you feel any tingling, itching or other irritation, rinse right away. It is normal to feel some coolness on the skin after using the  antiseptic soap. Your skin may feel a bit dry after the shower, but do not use any lotions, creams or moisturizers. Do not use hair spray or other products in your hair.  - Dress in freshly washed clothes.  - Do not wear deodorant, cologne, lotion, makeup, nail polish or jewelry to surgery.    ? If there is any change in your health PRIOR to your surgery, please contact your surgeon's office.  Such as a fever, body aches, fatigue, any flu-like symptoms, rash, or any new injury to related body part.    ? Arrange for someone to drive you home after discharge.    will need to be a responsible adult (18 years or older) that will provide transportation to and from surgery and stay in the waiting room during your surgery. You may not drive yourself or take public transportation to and from surgery.    ? Arrange for someone to stay with you for 24 hours after you go home.   This person must be a responsible adult (18 years or older).    ? Bring these items to the hospital/surgery center:   ? Insurance card(s)  ? Money for parking and co-pays, if needed  ? A list of all the medications you take, including vitamins, minerals, herbs and over the counter medications.    ? A copy of your Advance Health Care Directive, if you have one.  This tells us what treatment(s) you would or would not want, and who would make health care decisions, if you could no longer speak for yourself.    ? A case for glasses, contact lenses, hearing aids or dentures.   ? Your inhaler or CPAP machine, if you use these at home    ? Leave extra cash, jewelry and other valuables at home.         When you arrive for surgery  When you get to the surgery center/hospital, you will:  - Check in. If you are under age 18, you must be with a parent or legal guardian.  - Sign consent forms, if you haven t already. These forms state that you know the risks and benefits of surgery. When you sign the forms, you give us permission to do the surgery. Do not sign  them unless you understand what will happen during and after your surgery. If you have any questions about your surgery, ask to speak with your doctor before you sign the forms. If you don t understand the answers, ask again.  - Receive a copy of the Patient s Bill of Rights. If you do not receive a copy, please ask for one.  - Change into hospital clothes. Your belongings will be placed in a bag. We will return them to you after surgery.  - Meet with the anesthesia provider. He or she will tell you what kind of anesthesia (medicine) will be used to keep you comfortable during surgery.  - Remember: it s okay to remind doctors and nurses to wash their hands before touching you.  - In most cases, your surgeon will use a marker to write his or her initials on the surgery site. This ensures that the exact site is operated on.  - For safety reasons, we will ask you the same questions many times. For example, we may ask your name and birth date over and over again.  - Friends and family can stay with you until it s time for surgery. While you re in surgery, they will be in the waiting area. Please note that cell phones are not allowed in some patient care areas.  - If you have questions about what will happen in the operating room, talk to your care team.  - You will meet with an anesthesiologist, before your surgery.  He or she may reference types of anesthesia commonly used for surgeries:   o General:  This involves the use of an IV for injection of medication and anesthesia. You are put into a sleep and have a breathing tube to assist you with breathing.  o Sedation:  You are asleep, but not so deply that you need a breathing tube.   o Local or Regional: a nerve is injected to numb the surgical area.  o Spinal: you are numbed from the waist down with medicine injected into your back.  o Femoral Nerve Block:  Anesthesia injected into the groin of leg which you are having surgery on.      After surgery  We will move you  to a recovery room, where we will watch you closely. If you have any pain or discomfort, tell your nurse. He or she will try to make you comfortable.    - We will move you to your hospital room after you are awake and stable. If you having any pain or discomfort, please let your nursing staff know.  - Please wash your hands every time you touch the wound or change bandages or dressings.  - Do not submerge the wound in water for 3 months after surgery.  You may not use a bathtub, hot tub, pool, or lake. Showering is ok. Any open area can be a source of incoming bacteria, which can lead to infection. Keep all dressings clean and dry.   - Elevate your leg(s) as much as possible to help reduce swelling. You will also receive compression stockings for the surgical leg. Please wear these during the day and fully remove them at night. Continue to wear these for approximately 4 weeks after surgery or until your swelling has resolved.  - You may put ice on the surgical area for comfort, keeping ice on area for up to 20 minutes then off for 20 minutes.  You may do this the first few days after surgery to help reduce pain and swelling.    - Drink at least 8-10 glasses of liquid each day to help your body heal.  - Keep your lungs clear by coughing and taking deep breaths every couple hours.  This is especially important the first 48 hours after surgery.  - Typically, you will be able to start driving once you are fully off narcotics and able to do a the quick stop test (slamming on the brake) with your right leg without experiencing much pain.  - You will start physical therapy while in the hospital. Once you leave the hospital, you will need to continue going to physical therapy to maintain and improve your range of motion and strength. Please call the physical therapy clinic of your choice to set up an appointment within 1 week of being discharged from the hospital.    - Notify your doctor if you have any of the following:    o Fever of 101 F or higher  o Numbness and/or tingling  o Increased pain, redness or swelling  o Drainage from wound  o Prolonged or uncontrolled bleeding  o Difficulty with movement     Follow-up Appointments, in Clinic  If you don't already have an appointment scheduled, please call to set up an appointment at [phone].      ? Nurse Only Appointment (2 weeks post op)  ? Post-Op appointments with provider (6 weeks post op)    Dealing with pain  A nurse will check your comfort level often during your stay. He or she will work with you to manage your pain.  It s expected that you will have pain after surgery.  Our goal is to reduce or minimize pain by:   - Remember pain is real. There are many ways to control pain. We can help you decide what works best for you.  - Ask for pain medicine when you need it.  Don t try to  tough it out --this can make you feel worse. Always take your medicine as ordered.  - Medicine doesn t work the same for everyone. If your medicine isn t working, tell your nurse. There may be other medicines or treatments we can try.  - Medication Refills.  If you need refills on your pain medication, please call the clinic as soon as possible.  It may take 72-business hours to obtain a refill.  Refills must be picked up at check-in 2, [facility] or mailed to a pharmacy of your choice.    - It is expected that you will wean off the pain medications in a timely manner.   - Constipation is a common side effect of pain medication, decreased activity and anesthesia from surgery.  Take a stool softener as prescribed by your doctor at the time of discharge.  You may also use over the counter medications as needed.  Be sure to increase your fiber (fruits and vegetables) and your water intake.      Please call the clinic at [phone], if you experience any problems or have questions.  If you are having an emergency, always call 241 or seek immediate evaluation at the Emergency  Room.    Thank you for selecting the [facility] for your care!  ---------------------------------------------        Thanks for coming today.  Ortho/Sports Medicine Clinic  [address] [place] [address]    To schedule future appointments in Ortho Clinic, you may call [phone].    To schedule ordered imaging by your provider:   Call Central Imaging [phone]    To schedule an injection ordered by your provider:  Call Central Imaging Injection scheduling line: [phone]  [employer] available online at:  [email or web address]    Please call if any further questions or concerns ([phone]).  Clinic hours 8 am to 5 pm.    Return to clinic (call) if symptoms worsen or fail to improve.            Follow-ups after your visit        Your next 10 appointments already scheduled     Dec 11, 2018 11:15 AM CST   [facility]    [address] [place] [address] [phone]           Note: this is not an onsite visit; there is no need to come to the facility.            2019   Procedure with Marco Cruz MD   [facility] [place], Same Day Surgery (--)    [address] [facility] [id number] [phone]            Jett 15, 2019  1:00 PM CST   Nurse Only with MG NURSE ONLY ORTHO   [facility])    [address] [place] [address] [phone]            2019  1:30 PM CST   Return Visit with Marco Cruz MD   [facility])    [address] [place] [address] [phone]              Who to contact     If you have questions or need follow up information about today's clinic visit or your schedule please contact [facility] directly at [phone].  Normal or non-critical lab and imaging results will be  "communicated to you by MyChart, letter or phone within 4 business days after the clinic has received the results. If you do not hear from us within 7 days, please contact the clinic through ServiceRelated or phone. If you have a critical or abnormal lab result, we will notify you by phone as soon as possible.  Submit refill requests through ServiceRelated or call your pharmacy and they will forward the refill request to us. Please allow 3 business days for your refill to be completed.          Additional Information About Your Visit        ServiceRelated Information     ServiceRelated is an electronic gateway that provides easy, online access to your medical records. With ServiceRelated, you can request a clinic appointment, read your test results, renew a prescription or communicate with your care team.     To sign up for ServiceRelated visit the website at [email or web address]   You will be asked to enter the access code listed below, as well as some personal information. Please follow the directions to create your username and password.     Your access code is: [email or web address]  Expires: 2019  9:42 AM     Your access code will  in 90 days. If you need help or a new code, please contact your [facility] or call [phone] for assistance.        Care EveryWhere ID     This is your Care EveryWhere ID. This could be used by other organizations to access your [facility] medical records  [id number]        Your Vitals Were     Pulse Height Pulse Oximetry BMI (Body Mass Index)          63 1.73 m (5' 8.11\") 94% 33.69 kg/m2         Blood Pressure from Last 3 Encounters:   10/02/18 113/67   18 126/73    Weight from Last 3 Encounters:   18 100.8 kg (222 lb 4.8 oz)              We Performed the Following     Dang-Operative Worksheet        Primary Care Provider Office Phone # Fax #    Madhu Miller [phone]       [facility] [address] [place] [address]      " "  Equal Access to Services     [facility]: Hadii aad ku hadarchieo Sogueroali, waaxda luqadaha, qaybta kaalmada moredulce, sarah ruben hendersonjorgeabby russo. So [facility] [phone].    ATENCIÓN: Si habla español, tiene a spencer disposición servicios gratuitos de asistencia lingüística. Lanaame al [phone].    We comply with applicable federal civil rights laws and [place] laws. We do not discriminate on the basis of race, color, national origin, age, disability, sex, sexual orientation, or gender identity.            Thank you!     Thank you for choosing [facility]  for your care. Our goal is always to provide you with excellent care. Hearing back from our patients is one way we can continue to improve our services. Please take a few minutes to complete the written survey that you may receive in the mail after your visit with us. Thank you!             Your Updated Medication List - Protect others around you: Learn how to safely use, store and throw away your medicines at www.disposemymeds.org.          This list is accurate as of 9/18/18 11:59 PM.  Always use your most recent med list.                   Brand Name Dispense Instructions for use Diagnosis    aspirin 81 MG chewable tablet    ASA          citalopram 40 MG tablet    celeXA          clonazePAM 1 MG tablet    klonoPIN          cycloSPORINE 0.05 % ophthalmic emulsion    RESTASIS          FIFTY50 GLUCOSE METER 2.0 w/Device Kit      Dispense meter, test strips, lancets covered by pt ins. E11.65 IDDM type II, uncontrolled - Test 6 times/day. Reason: Unstable diabetes        halobetasol 0.05 % ointment    ULTRAVATE          Insulin Syringe 30G X 5/16\" 1 ML Misc      Dispense item covered by patient insurance. For administering insulin at home.        JANTOVEN 5 MG tablet   Generic drug:  warfarin           ketoconazole 2 % external cream    NIZORAL          [employer] TEST STRIPS test strip   Generic drug:  blood glucose monitoring      " Dispense test strips covered by the patient insurance. Test 4 times per day, before meals and at bedtime.        lisinopril 5 MG tablet    PRINIVIL/ZESTRIL          metoprolol tartrate 25 MG tablet    LOPRESSOR          nitroGLYcerin 0.4 MG sublingual tablet    NITROSTAT          NOVOFINE 30G X 8 MM miscellaneous   Generic drug:  insulin pen needle           omeprazole 20 MG DR capsule    priLOSEC          rosuvastatin 40 MG tablet    CRESTOR     Take 40 mg by mouth        TYLENOL PO      Take 650 mg by mouth        VICTOZA PEN 18 MG/3ML solution   Generic drug:  liraglutide      Inject 1.8 mg Subcutaneous        vitamin D3 1000 units Caps      Take 1 capsule by mouth

## 2018-09-18 NOTE — LETTER
9/18/2018         RE: Chase Zhao  [address] [facility] [address]        Dear Colleague,    Thank you for referring your patient, Chase Zhao, to the [facility]. Please see a copy of my visit note below.    Chief Complaint: Consult ( Right Knee Pain/Issues  **XR not needed per Dr. Cruz)    Physician:  No ref. provider found    HPI: Chase Zhao is a 73 year old male who presents today for evaluation of her right knee. He reports that long term increasingly debilitating symptoms despite comprehensive non-operative management. He presents today to discuss treatment options. He has questions about total knee.     MEDICAL HISTORY: History reviewed. No pertinent past medical history.    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 Discussed risk factors.    Medications:     Current Outpatient Prescriptions:      Acetaminophen (TYLENOL PO), Take 650 mg by mouth, Disp: , Rfl:      aspirin 81 MG chewable tablet, , Disp: , Rfl:      blood glucose monitoring ([employer] TEST STRIPS) test strip, Dispense test strips covered by the patient insurance. Test 4 times per  "day, before meals and at bedtime., Disp: , Rfl:      Blood Glucose Monitoring Suppl (FIFTY50 GLUCOSE METER 2.0) w/Device KIT, Dispense meter, test strips, lancets covered by pt ins. E11.65 IDDM type II, uncontrolled - Test 6 times/day. Reason: Unstable diabetes, Disp: , Rfl:      Cholecalciferol (VITAMIN D3) 1000 units CAPS, Take 1 capsule by mouth, Disp: , Rfl:      citalopram (CELEXA) 40 MG tablet, , Disp: , Rfl:      clonazePAM (KLONOPIN) 1 MG tablet, , Disp: , Rfl:      cycloSPORINE (RESTASIS) 0.05 % ophthalmic emulsion, , Disp: , Rfl:      halobetasol (ULTRAVATE) 0.05 % ointment, , Disp: , Rfl:      Insulin Syringe 30G X 5/16\" 1 ML MISC, Dispense item covered by patient insurance. For administering insulin at home., Disp: , Rfl:      JANTOVEN 5 MG tablet, , Disp: , Rfl:      ketoconazole (NIZORAL) 2 % cream, , Disp: , Rfl:      liraglutide (VICTOZA PEN) 18 MG/3ML soln, Inject 1.8 mg Subcutaneous, Disp: , Rfl:      lisinopril (PRINIVIL/ZESTRIL) 5 MG tablet, , Disp: , Rfl:      metoprolol tartrate (LOPRESSOR) 25 MG tablet, , Disp: , Rfl:      nitroGLYcerin (NITROSTAT) 0.4 MG sublingual tablet, , Disp: , Rfl:      NOVOFINE 30G X 8 MM insulin pen needle, , Disp: , Rfl:      omeprazole (PRILOSEC) 20 MG CR capsule, , Disp: , Rfl:      rosuvastatin (CRESTOR) 40 MG tablet, Take 40 mg by mouth, Disp: , Rfl:     Allergies: Review of patient's allergies indicates not on file.    SURGICAL HISTORY: History reviewed. No pertinent surgical history.    FAMILY HISTORY: History reviewed. No pertinent family history.    SOCIAL HISTORY:   Social History   Substance Use Topics     Smoking status: Never Smoker     Smokeless tobacco: Never Used     Alcohol use Not on file       REVIEW OF SYSTEMS:  The comprehensive review of systems from the intake form was reviewed with the patient.  No fever, weight change or fatigue. No dry eyes. No oral ulcers, sore throat or voice change. No palpitations, syncope, angina or edema.  No chest " "pain, excessive sleepiness, shortness of breath or hemoptysis.   No abdominal pain, nausea, vomiting, diarrhea or heartburn.  No skin rash. No focal weakness or numbness. No bleeding or lymphadenopathy. No rhinitis or hives.     Exam:  On physical examination the patient appears the stated age, is in no acute distress, affectThe is appropriate, and breathing is non-labored.  Vitals are documented in the EMR and have been reviewed:    /73  Pulse 63  Ht 1.73 m (5' 8.11\")  Wt 100.8 kg (222 lb 4.8 oz)  SpO2 94%  BMI 33.69 kg/m2  5' 8.11\"  Body mass index is 33.69 kg/(m^2).    Right knee is benign in appearance without gross effusion. ROM is notable for an approximately 25 degree flexion contracture and and additional 90 degrees of flexion. Collateral ligaments intact.   Distally, the circulatory, motor, and sensation exam is intact with 5/5 EHL, gastroc-soleus, and tibialis anterior.  Sensation to light touch is intact.  Dorsalis pedis and posterior tibialis pulses are palpable.  There are no sores on the feet, no bruising, and no lymphedema.    X-rays:   Final Report   Exam: 3 views of the right knee dated 9/18/2018.    COMPARISON: None.    CLINICAL HISTORY: Right knee pain.    FINDINGS: AP, lateral, and axial views of the right knee were  obtained. Joint space narrowing in the medial femorotibial joint  compartment of the right knee joint. No large right knee joint  effusion. Tricompartmental osteophytic spurring. Bony fragmentation  along the lateral aspect of the patella. Slight lateral patellar  subluxation.    IMPRESSION: Osteoarthrosis in the right knee, greatest in the medial  femorotibial joint compartment.    SHAKA HALE MD    Assessment: This is a 73 year old with advanced right knee osteoarthritis associated with severe symptoms. We discussed the treatment options including living with it and total knee. We spent approximately 20 minutes discussing the risks and benefits of total knee " arthroplasty.  We reviewed the proposed surgical plan.  The discussion included but was not limited to the following:  Blood clots, blood clots to the lungs, injury to blood vessels and nerves, the literature as it pertains to known problems with leg length discrepancy, stiffness, anterior knee pain, and infection. We discussed the post-operative recovery for the typical patient, return to driving, and return to normal activities.  All the patient's questions were answered to the best of my ability and would like to proceed.    Plan:  Right total knee      Again, thank you for allowing me to participate in the care of your patient.        Sincerely,        Marco Cruz MD

## 2018-10-02 ENCOUNTER — OFFICE VISIT (OUTPATIENT)
Dept: ORTHOPEDICS | Facility: CLINIC | Age: 73
End: 2018-10-02
Payer: MEDICARE

## 2018-10-02 VITALS — OXYGEN SATURATION: 94 % | HEART RATE: 64 BPM | DIASTOLIC BLOOD PRESSURE: 67 MMHG | SYSTOLIC BLOOD PRESSURE: 113 MMHG

## 2018-10-02 DIAGNOSIS — G89.29 CHRONIC PAIN OF RIGHT KNEE: Primary | ICD-10-CM

## 2018-10-02 DIAGNOSIS — M25.561 CHRONIC PAIN OF RIGHT KNEE: Primary | ICD-10-CM

## 2018-10-02 PROCEDURE — 99213 OFFICE O/P EST LOW 20 MIN: CPT | Performed by: ORTHOPAEDIC SURGERY

## 2018-10-02 RX ORDER — FLUTICASONE PROPIONATE 0.05 %
CREAM (GRAM) TOPICAL
Status: ON HOLD | COMMUNITY
Start: 2018-08-02 | End: 2019-01-07

## 2018-10-02 NOTE — LETTER
10/2/2018         RE: Chase Zhao  [address] [facility] [address]        Dear Colleague,    Thank you for referring your patient, Chase Zhao, to the [facility]. Please see a copy of my visit note below.    Returns today with documentation regarding lower extremity stenting. The documentation he has today show that the stenting was all on the left and this is what his wife has written down as well which would make tourniquet use appropriate. He is going to check in with his vascular surgeon prior to surgery and if the documentation that we have today is complete would expect to be able to proceed with a right total knee soon. All the patient's and his wife's questions were answered to the best of my ability.     Fifteen minutes were spent with the patient with greater than 50% on counseling and coordination of care.       Again, thank you for allowing me to participate in the care of your patient.        Sincerely,        Marco Cruz MD

## 2018-10-02 NOTE — PROGRESS NOTES
Returns today with documentation regarding lower extremity stenting. The documentation he has today show that the stenting was all on the left and this is what his wife has written down as well which would make tourniquet use appropriate. He is going to check in with his vascular surgeon prior to surgery and if the documentation that we have today is complete would expect to be able to proceed with a right total knee soon. All the patient's and his wife's questions were answered to the best of my ability.     Fifteen minutes were spent with the patient with greater than 50% on counseling and coordination of care.

## 2018-10-02 NOTE — NURSING NOTE
Chase Zhao's chief complaint for this visit includes:  Chief Complaint   Patient presents with     Right Knee - Follow Up For     Follow up to review records     PCP: Madhu Miller    Referring Provider:  No referring provider defined for this encounter.    /67  Pulse 64  SpO2 94%  Data Unavailable     Do you need any medication refills at today's visit? n/a

## 2018-10-02 NOTE — PATIENT INSTRUCTIONS
Thanks for coming today.  [facility]  [address] [place] [address]    To schedule future appointments in Ortho Clinic, you may call [phone].    To schedule ordered imaging by your provider:   Call Central Imaging [phone]    To schedule an injection ordered by your provider:  Call Central Imaging Injection scheduling line: [phone]  Cris[facility] Gamer[facility] available online at:  [email or web address]    Please call if any further questions or concerns ([phone]).  Clinic hours 8 am to 5 pm.    Return to clinic (call) if symptoms worsen or fail to improve.

## 2018-11-20 ENCOUNTER — TELEPHONE (OUTPATIENT)
Dept: ORTHOPEDICS | Facility: CLINIC | Age: 73
End: 2018-11-20

## 2018-11-20 NOTE — TELEPHONE ENCOUNTER
Called and talked with patient and wife, relayed that I can see the heart scan from Dr. Arana and I sent this message along to Dr. Cruz to ask if patient needed to be seen in clinic again or if they can schedule his knee surgery. I will call them back with an update after I hear from Dr. Cruz.  She expressed understanding and will wait to hear from us.  Jenn Alexandra RN

## 2018-11-20 NOTE — TELEPHONE ENCOUNTER
[facility] Call Center    Phone Message    May a detailed message be left on voicemail: yes    Reason for Call: Other: Patient's wife, Sonja,  is calling to see if the results from a recent heart scan from Dr. Grubbss at [facility] was received. Patient would like to schedule his knee surgery. Please advise [phone]     Action Taken: Message routed to:  Adult Clinics: Orthopedics p [phone]

## 2018-11-29 NOTE — TELEPHONE ENCOUNTER
Date Scheduled: 1-2-19 at 10:40  Facility: [facility]  Surgeon: Dr. Cruz   Post-op appointment scheduled:   Next 5 appointments (look out 90 days)     Dec 11, 2018 11:15 AM CST   Telephone Visit with Marco Cruz MD   [facility])    [address] [place] [id number] [phone]            Jett 15, 2019  1:00 PM CST   Nurse Only with MG NURSE ONLY ORTHO   [facility])    [address] [place] [address] [id number] [phone]            Feb 05, 2019  1:30 PM CST   Return Visit with Marco Cruz MD   [facility])    [address] [place] [address] [id number] [phone]                   scheduled?: No  Surgery packet/instructions confirmed received?  No, please mail  Special Considerations:   Jocelynn Nielsen, Surgery Scheduling Coordinator

## 2018-11-29 NOTE — TELEPHONE ENCOUNTER
Talked with Dr. Cruz, he agreed that patient is able to have surgical discussion and be scheduled for knee replacement.  Dr. Cruz will do a phone call to patient to discuss risk benefits of surgery.  Talked with Sonja, patient's wife, and relayed information.  Discussed some about pre-op paperwork and what needs to be done currently.  Will send packet with surgical information.  Transferred to [facility] to schedule surgery.  Jenn Alexandra RN

## 2018-12-04 ENCOUNTER — TELEPHONE (OUTPATIENT)
Dept: ORTHOPEDICS | Facility: CLINIC | Age: 73
End: 2018-12-04

## 2018-12-04 DIAGNOSIS — M17.11 PRIMARY OSTEOARTHRITIS OF RIGHT KNEE: Primary | ICD-10-CM

## 2018-12-04 NOTE — TELEPHONE ENCOUNTER
[facility] Call Center    Phone Message    May a detailed message be left on voicemail: yes    Reason for Call: Sonja calling with questions regarding the packet they received with paper work.  Please advise.  Thank you.     Action Taken: Message routed to:  Adult Clinics: Orthopedics p [phone]

## 2018-12-04 NOTE — TELEPHONE ENCOUNTER
Called and talked with Sonja, she had multiple questions about preparing for surgery. All questions were answered. She will call with any other questions.  Jenn Alexandra RN

## 2018-12-10 ENCOUNTER — TELEPHONE (OUTPATIENT)
Dept: LAB | Facility: CLINIC | Age: 73
End: 2018-12-10

## 2018-12-11 ENCOUNTER — APPOINTMENT (OUTPATIENT)
Dept: ORTHOPEDICS | Facility: CLINIC | Age: 73
End: 2018-12-11
Payer: MEDICARE

## 2018-12-11 NOTE — PROGRESS NOTES
Spoke with patient at length on the phone regarding surgery. We spent approximately 20 minutes discussing the risks and benefits of total knee arthroplasty.  We reviewed the proposed surgical plan.  The discussion included but was not limited to the following:  Blood clots, blood clots to the lungs, injury to blood vessels and nerves, the literature as it pertains to known problems with leg length discrepancy, stiffness, anterior knee pain, and infection. We discussed the post-operative recovery for the typical patient, return to driving, and return to normal activities.  All the patient's questions were answered to the best of my ability and would like to proceed.

## 2018-12-18 ENCOUNTER — HOSPITAL ENCOUNTER (OUTPATIENT)
Dept: EDUCATION SERVICES | Facility: CLINIC | Age: 73
Discharge: HOME | End: 2018-12-18
Payer: MEDICARE

## 2018-12-18 DIAGNOSIS — M25.561 CHRONIC PAIN OF RIGHT KNEE: Primary | ICD-10-CM

## 2018-12-18 DIAGNOSIS — M17.11 PRIMARY OSTEOARTHRITIS OF RIGHT KNEE: ICD-10-CM

## 2018-12-18 DIAGNOSIS — G89.29 CHRONIC PAIN OF RIGHT KNEE: Primary | ICD-10-CM

## 2018-12-18 PROCEDURE — 36415 COLL VENOUS BLD VENIPUNCTURE: CPT | Performed by: ORTHOPAEDIC SURGERY

## 2018-12-18 PROCEDURE — 86850 RBC ANTIBODY SCREEN: CPT | Performed by: ORTHOPAEDIC SURGERY

## 2018-12-18 PROCEDURE — 86900 BLOOD TYPING SEROLOGIC ABO: CPT | Performed by: ORTHOPAEDIC SURGERY

## 2018-12-18 PROCEDURE — 86901 BLOOD TYPING SEROLOGIC RH(D): CPT | Performed by: ORTHOPAEDIC SURGERY

## 2018-12-27 RX ORDER — CHLORAL HYDRATE 500 MG
2 CAPSULE ORAL DAILY
Status: ON HOLD | COMMUNITY
End: 2019-01-07

## 2019-01-01 ENCOUNTER — ANESTHESIA EVENT (OUTPATIENT)
Dept: SURGERY | Facility: CLINIC | Age: 74
DRG: 470 | End: 2019-01-01
Payer: MEDICARE

## 2019-01-02 ENCOUNTER — ANESTHESIA (OUTPATIENT)
Dept: SURGERY | Facility: CLINIC | Age: 74
DRG: 470 | End: 2019-01-02
Payer: MEDICARE

## 2019-01-02 ENCOUNTER — APPOINTMENT (OUTPATIENT)
Dept: GENERAL RADIOLOGY | Facility: CLINIC | Age: 74
DRG: 470 | End: 2019-01-02
Attending: ORTHOPAEDIC SURGERY
Payer: MEDICARE

## 2019-01-02 ENCOUNTER — HOSPITAL ENCOUNTER (INPATIENT)
Facility: CLINIC | Age: 74
LOS: 5 days | Discharge: NURSING, REHAB OR LONG-TERM CARE | DRG: 470 | End: 2019-01-07
Attending: ORTHOPAEDIC SURGERY | Admitting: ORTHOPAEDIC SURGERY
Payer: MEDICARE

## 2019-01-02 DIAGNOSIS — I82.401 ACUTE DEEP VEIN THROMBOSIS (DVT) OF RIGHT LOWER EXTREMITY, UNSPECIFIED VEIN (H): ICD-10-CM

## 2019-01-02 DIAGNOSIS — Z98.890 STATUS POST KNEE SURGERY: Primary | ICD-10-CM

## 2019-01-02 DIAGNOSIS — G45.9 TIA (TRANSIENT ISCHEMIC ATTACK): ICD-10-CM

## 2019-01-02 LAB
ALBUMIN UR-MCNC: 10 MG/DL
ANION GAP SERPL CALCULATED.3IONS-SCNC: 5 MMOL/L (ref 3–14)
APPEARANCE UR: CLEAR
BILIRUB UR QL STRIP: NEGATIVE
BUN SERPL-MCNC: 18 MG/DL (ref 7–30)
CALCIUM SERPL-MCNC: 8.7 MG/DL (ref 8.5–10.1)
CHLORIDE SERPL-SCNC: 107 MMOL/L (ref 94–109)
CO2 SERPL-SCNC: 27 MMOL/L (ref 20–32)
COLOR UR AUTO: YELLOW
CREAT SERPL-MCNC: 1.03 MG/DL (ref 0.66–1.25)
GFR SERPL CREATININE-BSD FRML MDRD: 70 ML/MIN/{1.73_M2}
GLUCOSE BLDC GLUCOMTR-MCNC: 127 MG/DL (ref 70–99)
GLUCOSE BLDC GLUCOMTR-MCNC: 228 MG/DL (ref 70–99)
GLUCOSE BLDC GLUCOMTR-MCNC: 267 MG/DL (ref 70–99)
GLUCOSE SERPL-MCNC: 242 MG/DL (ref 70–99)
GLUCOSE UR STRIP-MCNC: 30 MG/DL
HGB BLD-MCNC: 15.6 G/DL (ref 13.3–17.7)
HGB UR QL STRIP: NEGATIVE
INR PPP: 0.99 (ref 0.86–1.14)
KETONES UR STRIP-MCNC: NEGATIVE MG/DL
LEUKOCYTE ESTERASE UR QL STRIP: NEGATIVE
MUCOUS THREADS #/AREA URNS LPF: PRESENT /LPF
NITRATE UR QL: NEGATIVE
PH UR STRIP: 6 PH (ref 5–7)
PLATELET # BLD AUTO: 134 10E9/L (ref 150–450)
POTASSIUM SERPL-SCNC: 4.9 MMOL/L (ref 3.4–5.3)
RBC #/AREA URNS AUTO: <1 /HPF (ref 0–2)
SODIUM SERPL-SCNC: 139 MMOL/L (ref 133–144)
SOURCE: ABNORMAL
SP GR UR STRIP: 1.01 (ref 1–1.03)
UROBILINOGEN UR STRIP-MCNC: NORMAL MG/DL (ref 0–2)
WBC #/AREA URNS AUTO: 1 /HPF (ref 0–5)

## 2019-01-02 PROCEDURE — 36415 COLL VENOUS BLD VENIPUNCTURE: CPT | Performed by: ANESTHESIOLOGY

## 2019-01-02 PROCEDURE — 25000128 H RX IP 250 OP 636: Performed by: ORTHOPAEDIC SURGERY

## 2019-01-02 PROCEDURE — 85018 HEMOGLOBIN: CPT | Performed by: ANESTHESIOLOGY

## 2019-01-02 PROCEDURE — 86850 RBC ANTIBODY SCREEN: CPT | Performed by: ANESTHESIOLOGY

## 2019-01-02 PROCEDURE — 81001 URINALYSIS AUTO W/SCOPE: CPT | Performed by: ANESTHESIOLOGY

## 2019-01-02 PROCEDURE — 0SRC0J9 REPLACEMENT OF RIGHT KNEE JOINT WITH SYNTHETIC SUBSTITUTE, CEMENTED, OPEN APPROACH: ICD-10-PCS | Performed by: ORTHOPAEDIC SURGERY

## 2019-01-02 PROCEDURE — 85610 PROTHROMBIN TIME: CPT | Performed by: ANESTHESIOLOGY

## 2019-01-02 PROCEDURE — 25000128 H RX IP 250 OP 636: Performed by: INTERNAL MEDICINE

## 2019-01-02 PROCEDURE — 86901 BLOOD TYPING SEROLOGIC RH(D): CPT | Performed by: ANESTHESIOLOGY

## 2019-01-02 PROCEDURE — 25000132 ZZH RX MED GY IP 250 OP 250 PS 637: Mod: GY | Performed by: INTERNAL MEDICINE

## 2019-01-02 PROCEDURE — C1776 JOINT DEVICE (IMPLANTABLE): HCPCS | Performed by: ORTHOPAEDIC SURGERY

## 2019-01-02 PROCEDURE — 25000128 H RX IP 250 OP 636: Performed by: STUDENT IN AN ORGANIZED HEALTH CARE EDUCATION/TRAINING PROGRAM

## 2019-01-02 PROCEDURE — 40000986 XR KNEE PORT RT 1/2 VW: Mod: RT

## 2019-01-02 PROCEDURE — 25000125 ZZHC RX 250: Performed by: ANESTHESIOLOGY

## 2019-01-02 PROCEDURE — 36415 COLL VENOUS BLD VENIPUNCTURE: CPT | Performed by: STUDENT IN AN ORGANIZED HEALTH CARE EDUCATION/TRAINING PROGRAM

## 2019-01-02 PROCEDURE — 25800025 ZZH RX 258: Performed by: ORTHOPAEDIC SURGERY

## 2019-01-02 PROCEDURE — 27810169 ZZH OR IMPLANT GENERAL: Performed by: ORTHOPAEDIC SURGERY

## 2019-01-02 PROCEDURE — C9290 INJ, BUPIVACAINE LIPOSOME: HCPCS | Performed by: ANESTHESIOLOGY

## 2019-01-02 PROCEDURE — A9270 NON-COVERED ITEM OR SERVICE: HCPCS | Mod: GY | Performed by: ANESTHESIOLOGY

## 2019-01-02 PROCEDURE — 25000132 ZZH RX MED GY IP 250 OP 250 PS 637: Mod: GY | Performed by: ANESTHESIOLOGY

## 2019-01-02 PROCEDURE — 85049 AUTOMATED PLATELET COUNT: CPT | Performed by: STUDENT IN AN ORGANIZED HEALTH CARE EDUCATION/TRAINING PROGRAM

## 2019-01-02 PROCEDURE — 00000146 ZZHCL STATISTIC GLUCOSE BY METER IP

## 2019-01-02 PROCEDURE — 71000015 ZZH RECOVERY PHASE 1 LEVEL 2 EA ADDTL HR: Performed by: ORTHOPAEDIC SURGERY

## 2019-01-02 PROCEDURE — 25000128 H RX IP 250 OP 636: Performed by: ANESTHESIOLOGY

## 2019-01-02 PROCEDURE — 99207 ZZC CONSULT E&M CHANGED TO INITIAL LEVEL: CPT | Performed by: INTERNAL MEDICINE

## 2019-01-02 PROCEDURE — 12000001 ZZH R&B MED SURG/OB UMMC

## 2019-01-02 PROCEDURE — 80048 BASIC METABOLIC PNL TOTAL CA: CPT | Performed by: INTERNAL MEDICINE

## 2019-01-02 PROCEDURE — 37000009 ZZH ANESTHESIA TECHNICAL FEE, EACH ADDTL 15 MIN: Performed by: ORTHOPAEDIC SURGERY

## 2019-01-02 PROCEDURE — 25000125 ZZHC RX 250: Performed by: NURSE ANESTHETIST, CERTIFIED REGISTERED

## 2019-01-02 PROCEDURE — 25000131 ZZH RX MED GY IP 250 OP 636 PS 637: Mod: GY | Performed by: INTERNAL MEDICINE

## 2019-01-02 PROCEDURE — 71000014 ZZH RECOVERY PHASE 1 LEVEL 2 FIRST HR: Performed by: ORTHOPAEDIC SURGERY

## 2019-01-02 PROCEDURE — 25000132 ZZH RX MED GY IP 250 OP 250 PS 637: Mod: GY | Performed by: ORTHOPAEDIC SURGERY

## 2019-01-02 PROCEDURE — 25000128 H RX IP 250 OP 636: Performed by: NURSE ANESTHETIST, CERTIFIED REGISTERED

## 2019-01-02 PROCEDURE — 40000170 ZZH STATISTIC PRE-PROCEDURE ASSESSMENT II: Performed by: ORTHOPAEDIC SURGERY

## 2019-01-02 PROCEDURE — 36000064 ZZH SURGERY LEVEL 4 EA 15 ADDTL MIN - UMMC: Performed by: ORTHOPAEDIC SURGERY

## 2019-01-02 PROCEDURE — 25000125 ZZHC RX 250: Performed by: ORTHOPAEDIC SURGERY

## 2019-01-02 PROCEDURE — A9270 NON-COVERED ITEM OR SERVICE: HCPCS | Mod: GY | Performed by: STUDENT IN AN ORGANIZED HEALTH CARE EDUCATION/TRAINING PROGRAM

## 2019-01-02 PROCEDURE — 86900 BLOOD TYPING SEROLOGIC ABO: CPT | Performed by: ANESTHESIOLOGY

## 2019-01-02 PROCEDURE — 99233 SBSQ HOSP IP/OBS HIGH 50: CPT | Performed by: INTERNAL MEDICINE

## 2019-01-02 PROCEDURE — A9270 NON-COVERED ITEM OR SERVICE: HCPCS | Mod: GY | Performed by: ORTHOPAEDIC SURGERY

## 2019-01-02 PROCEDURE — 36000062 ZZH SURGERY LEVEL 4 1ST 30 MIN - UMMC: Performed by: ORTHOPAEDIC SURGERY

## 2019-01-02 PROCEDURE — A9270 NON-COVERED ITEM OR SERVICE: HCPCS | Mod: GY | Performed by: INTERNAL MEDICINE

## 2019-01-02 PROCEDURE — 25000132 ZZH RX MED GY IP 250 OP 250 PS 637: Mod: GY | Performed by: STUDENT IN AN ORGANIZED HEALTH CARE EDUCATION/TRAINING PROGRAM

## 2019-01-02 PROCEDURE — 27210794 ZZH OR GENERAL SUPPLY STERILE: Performed by: ORTHOPAEDIC SURGERY

## 2019-01-02 PROCEDURE — 37000008 ZZH ANESTHESIA TECHNICAL FEE, 1ST 30 MIN: Performed by: ORTHOPAEDIC SURGERY

## 2019-01-02 PROCEDURE — 82565 ASSAY OF CREATININE: CPT | Performed by: STUDENT IN AN ORGANIZED HEALTH CARE EDUCATION/TRAINING PROGRAM

## 2019-01-02 PROCEDURE — C1713 ANCHOR/SCREW BN/BN,TIS/BN: HCPCS | Performed by: ORTHOPAEDIC SURGERY

## 2019-01-02 DEVICE — IMPLANTABLE DEVICE: Type: IMPLANTABLE DEVICE | Site: KNEE | Status: FUNCTIONAL

## 2019-01-02 DEVICE — BONE CEMENT SIMPLEX W/TOBRAMYCIN 6197-9-001: Type: IMPLANTABLE DEVICE | Site: KNEE | Status: FUNCTIONAL

## 2019-01-02 DEVICE — IMP COMP FEMORAL SNN GEN II CR SZ 6 RT 71420026: Type: IMPLANTABLE DEVICE | Site: KNEE | Status: FUNCTIONAL

## 2019-01-02 DEVICE — IMP INSERT ARTICULAR S&N LGN CR XLPE SZ5-6 13MM 71453123: Type: IMPLANTABLE DEVICE | Site: KNEE | Status: FUNCTIONAL

## 2019-01-02 RX ORDER — SODIUM CHLORIDE, SODIUM LACTATE, POTASSIUM CHLORIDE, CALCIUM CHLORIDE 600; 310; 30; 20 MG/100ML; MG/100ML; MG/100ML; MG/100ML
INJECTION, SOLUTION INTRAVENOUS CONTINUOUS
Status: DISCONTINUED | OUTPATIENT
Start: 2019-01-02 | End: 2019-01-03

## 2019-01-02 RX ORDER — SODIUM CHLORIDE, SODIUM LACTATE, POTASSIUM CHLORIDE, CALCIUM CHLORIDE 600; 310; 30; 20 MG/100ML; MG/100ML; MG/100ML; MG/100ML
INJECTION, SOLUTION INTRAVENOUS CONTINUOUS
Status: DISCONTINUED | OUTPATIENT
Start: 2019-01-02 | End: 2019-01-02 | Stop reason: HOSPADM

## 2019-01-02 RX ORDER — HYDROXYZINE HYDROCHLORIDE 10 MG/1
10 TABLET, FILM COATED ORAL EVERY 6 HOURS PRN
Status: DISCONTINUED | OUTPATIENT
Start: 2019-01-02 | End: 2019-01-02

## 2019-01-02 RX ORDER — CELECOXIB 200 MG/1
200 CAPSULE ORAL ONCE
Status: COMPLETED | OUTPATIENT
Start: 2019-01-02 | End: 2019-01-02

## 2019-01-02 RX ORDER — ONDANSETRON 2 MG/ML
4 INJECTION INTRAMUSCULAR; INTRAVENOUS EVERY 30 MIN PRN
Status: DISCONTINUED | OUTPATIENT
Start: 2019-01-02 | End: 2019-01-02 | Stop reason: HOSPADM

## 2019-01-02 RX ORDER — NALOXONE HYDROCHLORIDE 0.4 MG/ML
.1-.4 INJECTION, SOLUTION INTRAMUSCULAR; INTRAVENOUS; SUBCUTANEOUS
Status: DISCONTINUED | OUTPATIENT
Start: 2019-01-02 | End: 2019-01-02

## 2019-01-02 RX ORDER — DEXTROSE MONOHYDRATE 25 G/50ML
25-50 INJECTION, SOLUTION INTRAVENOUS
Status: DISCONTINUED | OUTPATIENT
Start: 2019-01-02 | End: 2019-01-07 | Stop reason: HOSPADM

## 2019-01-02 RX ORDER — CALCIUM CARBONATE 500 MG/1
1000 TABLET, CHEWABLE ORAL 4 TIMES DAILY PRN
Status: DISCONTINUED | OUTPATIENT
Start: 2019-01-02 | End: 2019-01-04

## 2019-01-02 RX ORDER — FLUMAZENIL 0.1 MG/ML
0.2 INJECTION, SOLUTION INTRAVENOUS
Status: DISCONTINUED | OUTPATIENT
Start: 2019-01-02 | End: 2019-01-02 | Stop reason: HOSPADM

## 2019-01-02 RX ORDER — ACETAMINOPHEN 325 MG/1
975 TABLET ORAL EVERY 8 HOURS
Status: DISCONTINUED | OUTPATIENT
Start: 2019-01-02 | End: 2019-01-04

## 2019-01-02 RX ORDER — ACETAMINOPHEN 325 MG/1
975 TABLET ORAL ONCE
Status: COMPLETED | OUTPATIENT
Start: 2019-01-02 | End: 2019-01-02

## 2019-01-02 RX ORDER — METHOCARBAMOL 500 MG/1
500 TABLET, FILM COATED ORAL 4 TIMES DAILY PRN
Status: DISCONTINUED | OUTPATIENT
Start: 2019-01-02 | End: 2019-01-02

## 2019-01-02 RX ORDER — ASPIRIN 81 MG/1
81 TABLET ORAL DAILY
Status: DISCONTINUED | OUTPATIENT
Start: 2019-01-03 | End: 2019-01-04

## 2019-01-02 RX ORDER — CEFAZOLIN SODIUM 2 G/100ML
2 INJECTION, SOLUTION INTRAVENOUS
Status: COMPLETED | OUTPATIENT
Start: 2019-01-02 | End: 2019-01-02

## 2019-01-02 RX ORDER — ONDANSETRON 4 MG/1
4 TABLET, ORALLY DISINTEGRATING ORAL EVERY 30 MIN PRN
Status: DISCONTINUED | OUTPATIENT
Start: 2019-01-02 | End: 2019-01-02 | Stop reason: HOSPADM

## 2019-01-02 RX ORDER — DEXAMETHASONE SODIUM PHOSPHATE 4 MG/ML
INJECTION, SOLUTION INTRA-ARTICULAR; INTRALESIONAL; INTRAMUSCULAR; INTRAVENOUS; SOFT TISSUE PRN
Status: DISCONTINUED | OUTPATIENT
Start: 2019-01-02 | End: 2019-01-02

## 2019-01-02 RX ORDER — AMOXICILLIN 250 MG
2 CAPSULE ORAL 2 TIMES DAILY
Status: DISCONTINUED | OUTPATIENT
Start: 2019-01-02 | End: 2019-01-04

## 2019-01-02 RX ORDER — CLONAZEPAM 1 MG/1
1 TABLET ORAL AT BEDTIME
Status: DISCONTINUED | OUTPATIENT
Start: 2019-01-02 | End: 2019-01-04

## 2019-01-02 RX ORDER — FENTANYL CITRATE 50 UG/ML
25-50 INJECTION, SOLUTION INTRAMUSCULAR; INTRAVENOUS
Status: DISCONTINUED | OUTPATIENT
Start: 2019-01-02 | End: 2019-01-02 | Stop reason: HOSPADM

## 2019-01-02 RX ORDER — PROCHLORPERAZINE MALEATE 5 MG
5 TABLET ORAL EVERY 6 HOURS PRN
Status: DISCONTINUED | OUTPATIENT
Start: 2019-01-02 | End: 2019-01-07 | Stop reason: HOSPADM

## 2019-01-02 RX ORDER — MAGNESIUM HYDROXIDE 1200 MG/15ML
LIQUID ORAL PRN
Status: DISCONTINUED | OUTPATIENT
Start: 2019-01-02 | End: 2019-01-02 | Stop reason: HOSPADM

## 2019-01-02 RX ORDER — BISACODYL 10 MG
10 SUPPOSITORY, RECTAL RECTAL DAILY
Status: DISCONTINUED | OUTPATIENT
Start: 2019-01-03 | End: 2019-01-07 | Stop reason: HOSPADM

## 2019-01-02 RX ORDER — CEFAZOLIN SODIUM 1 G/3ML
1 INJECTION, POWDER, FOR SOLUTION INTRAMUSCULAR; INTRAVENOUS SEE ADMIN INSTRUCTIONS
Status: DISCONTINUED | OUTPATIENT
Start: 2019-01-02 | End: 2019-01-02 | Stop reason: HOSPADM

## 2019-01-02 RX ORDER — ACETAMINOPHEN 325 MG/1
650 TABLET ORAL EVERY 4 HOURS PRN
Status: DISCONTINUED | OUTPATIENT
Start: 2019-01-05 | End: 2019-01-04

## 2019-01-02 RX ORDER — LIDOCAINE 40 MG/G
CREAM TOPICAL
Status: DISCONTINUED | OUTPATIENT
Start: 2019-01-02 | End: 2019-01-02

## 2019-01-02 RX ORDER — KETOROLAC TROMETHAMINE 15 MG/ML
15 INJECTION, SOLUTION INTRAMUSCULAR; INTRAVENOUS EVERY 6 HOURS
Status: DISCONTINUED | OUTPATIENT
Start: 2019-01-02 | End: 2019-01-02

## 2019-01-02 RX ORDER — ROSUVASTATIN CALCIUM 10 MG/1
40 TABLET, COATED ORAL AT BEDTIME
Status: DISCONTINUED | OUTPATIENT
Start: 2019-01-02 | End: 2019-01-04

## 2019-01-02 RX ORDER — LIDOCAINE HYDROCHLORIDE 20 MG/ML
INJECTION, SOLUTION INFILTRATION; PERINEURAL PRN
Status: DISCONTINUED | OUTPATIENT
Start: 2019-01-02 | End: 2019-01-02

## 2019-01-02 RX ORDER — PROPOFOL 10 MG/ML
INJECTION, EMULSION INTRAVENOUS CONTINUOUS PRN
Status: DISCONTINUED | OUTPATIENT
Start: 2019-01-02 | End: 2019-01-02

## 2019-01-02 RX ORDER — HYDROXYZINE HYDROCHLORIDE 25 MG/1
25 TABLET, FILM COATED ORAL EVERY 6 HOURS PRN
Status: DISCONTINUED | OUTPATIENT
Start: 2019-01-02 | End: 2019-01-02

## 2019-01-02 RX ORDER — BUPIVACAINE HYDROCHLORIDE 7.5 MG/ML
INJECTION, SOLUTION INTRASPINAL PRN
Status: DISCONTINUED | OUTPATIENT
Start: 2019-01-02 | End: 2019-01-02

## 2019-01-02 RX ORDER — GABAPENTIN 100 MG/1
100 CAPSULE ORAL
Status: COMPLETED | OUTPATIENT
Start: 2019-01-02 | End: 2019-01-02

## 2019-01-02 RX ORDER — BUPIVACAINE HYDROCHLORIDE AND EPINEPHRINE 2.5; 5 MG/ML; UG/ML
INJECTION, SOLUTION INFILTRATION; PERINEURAL PRN
Status: DISCONTINUED | OUTPATIENT
Start: 2019-01-02 | End: 2019-01-02

## 2019-01-02 RX ORDER — ONDANSETRON 2 MG/ML
4 INJECTION INTRAMUSCULAR; INTRAVENOUS EVERY 6 HOURS PRN
Status: DISCONTINUED | OUTPATIENT
Start: 2019-01-02 | End: 2019-01-07 | Stop reason: HOSPADM

## 2019-01-02 RX ORDER — LIDOCAINE 40 MG/G
CREAM TOPICAL
Status: DISCONTINUED | OUTPATIENT
Start: 2019-01-02 | End: 2019-01-02 | Stop reason: HOSPADM

## 2019-01-02 RX ORDER — OXYCODONE HYDROCHLORIDE 5 MG/1
5-10 TABLET ORAL EVERY 4 HOURS PRN
Status: DISCONTINUED | OUTPATIENT
Start: 2019-01-02 | End: 2019-01-02

## 2019-01-02 RX ORDER — NALOXONE HYDROCHLORIDE 0.4 MG/ML
.1-.4 INJECTION, SOLUTION INTRAMUSCULAR; INTRAVENOUS; SUBCUTANEOUS
Status: DISCONTINUED | OUTPATIENT
Start: 2019-01-02 | End: 2019-01-02 | Stop reason: HOSPADM

## 2019-01-02 RX ORDER — HYDROMORPHONE HCL/0.9% NACL/PF 0.2MG/0.2
0.2 SYRINGE (ML) INTRAVENOUS
Status: DISCONTINUED | OUTPATIENT
Start: 2019-01-02 | End: 2019-01-07 | Stop reason: HOSPADM

## 2019-01-02 RX ORDER — METOPROLOL TARTRATE 25 MG/1
25 TABLET, FILM COATED ORAL 2 TIMES DAILY
Status: DISCONTINUED | OUTPATIENT
Start: 2019-01-02 | End: 2019-01-04

## 2019-01-02 RX ORDER — NALOXONE HYDROCHLORIDE 0.4 MG/ML
.1-.4 INJECTION, SOLUTION INTRAMUSCULAR; INTRAVENOUS; SUBCUTANEOUS
Status: DISCONTINUED | OUTPATIENT
Start: 2019-01-02 | End: 2019-01-07 | Stop reason: HOSPADM

## 2019-01-02 RX ORDER — ACETAMINOPHEN 325 MG/1
975 TABLET ORAL ONCE
Status: DISCONTINUED | OUTPATIENT
Start: 2019-01-02 | End: 2019-01-02 | Stop reason: HOSPADM

## 2019-01-02 RX ORDER — WARFARIN SODIUM 5 MG/1
5 TABLET ORAL
Status: COMPLETED | OUTPATIENT
Start: 2019-01-02 | End: 2019-01-02

## 2019-01-02 RX ORDER — NICOTINE POLACRILEX 4 MG
15-30 LOZENGE BUCCAL
Status: DISCONTINUED | OUTPATIENT
Start: 2019-01-02 | End: 2019-01-07 | Stop reason: HOSPADM

## 2019-01-02 RX ORDER — CITALOPRAM HYDROBROMIDE 40 MG/1
40 TABLET ORAL DAILY
Status: DISCONTINUED | OUTPATIENT
Start: 2019-01-02 | End: 2019-01-04

## 2019-01-02 RX ORDER — AMOXICILLIN 250 MG
1 CAPSULE ORAL 2 TIMES DAILY
Status: DISCONTINUED | OUTPATIENT
Start: 2019-01-02 | End: 2019-01-04

## 2019-01-02 RX ORDER — HYDROMORPHONE HYDROCHLORIDE 1 MG/ML
.3-.5 INJECTION, SOLUTION INTRAMUSCULAR; INTRAVENOUS; SUBCUTANEOUS
Status: DISCONTINUED | OUTPATIENT
Start: 2019-01-02 | End: 2019-01-02

## 2019-01-02 RX ORDER — CEFAZOLIN SODIUM 2 G/100ML
2 INJECTION, SOLUTION INTRAVENOUS EVERY 8 HOURS
Status: COMPLETED | OUTPATIENT
Start: 2019-01-02 | End: 2019-01-03

## 2019-01-02 RX ORDER — LIRAGLUTIDE 6 MG/ML
1.8 INJECTION SUBCUTANEOUS DAILY
Status: DISCONTINUED | OUTPATIENT
Start: 2019-01-03 | End: 2019-01-04

## 2019-01-02 RX ORDER — ONDANSETRON 4 MG/1
4 TABLET, ORALLY DISINTEGRATING ORAL EVERY 6 HOURS PRN
Status: DISCONTINUED | OUTPATIENT
Start: 2019-01-02 | End: 2019-01-07 | Stop reason: HOSPADM

## 2019-01-02 RX ORDER — ONDANSETRON 2 MG/ML
INJECTION INTRAMUSCULAR; INTRAVENOUS PRN
Status: DISCONTINUED | OUTPATIENT
Start: 2019-01-02 | End: 2019-01-02

## 2019-01-02 RX ADMIN — BUPIVACAINE HYDROCHLORIDE AND EPINEPHRINE BITARTRATE 10 ML: 2.5; .005 INJECTION, SOLUTION INFILTRATION; PERINEURAL at 09:35

## 2019-01-02 RX ADMIN — ACETAMINOPHEN 975 MG: 325 TABLET, FILM COATED ORAL at 17:09

## 2019-01-02 RX ADMIN — CEFAZOLIN SODIUM 2 G: 2 INJECTION, SOLUTION INTRAVENOUS at 10:40

## 2019-01-02 RX ADMIN — ROSUVASTATIN CALCIUM 40 MG: 20 TABLET, FILM COATED ORAL at 22:19

## 2019-01-02 RX ADMIN — INSULIN HUMAN 16 UNITS: 100 INJECTION, SUSPENSION SUBCUTANEOUS at 19:00

## 2019-01-02 RX ADMIN — WARFARIN SODIUM 5 MG: 5 TABLET ORAL at 17:59

## 2019-01-02 RX ADMIN — PHENYLEPHRINE HYDROCHLORIDE 50 MCG: 10 INJECTION, SOLUTION INTRAMUSCULAR; INTRAVENOUS; SUBCUTANEOUS at 11:27

## 2019-01-02 RX ADMIN — SENNOSIDES AND DOCUSATE SODIUM 2 TABLET: 8.6; 5 TABLET ORAL at 21:03

## 2019-01-02 RX ADMIN — PHENYLEPHRINE HYDROCHLORIDE 100 MCG: 10 INJECTION, SOLUTION INTRAMUSCULAR; INTRAVENOUS; SUBCUTANEOUS at 11:50

## 2019-01-02 RX ADMIN — PROPOFOL 50 MCG/KG/MIN: 10 INJECTION, EMULSION INTRAVENOUS at 10:53

## 2019-01-02 RX ADMIN — CELECOXIB 200 MG: 200 CAPSULE ORAL at 08:47

## 2019-01-02 RX ADMIN — SODIUM CHLORIDE 1 G: 9 INJECTION, SOLUTION INTRAVENOUS at 10:40

## 2019-01-02 RX ADMIN — METOPROLOL TARTRATE 25 MG: 25 TABLET, FILM COATED ORAL at 21:02

## 2019-01-02 RX ADMIN — CITALOPRAM HYDROBROMIDE 40 MG: 40 TABLET ORAL at 17:59

## 2019-01-02 RX ADMIN — SODIUM CHLORIDE, POTASSIUM CHLORIDE, SODIUM LACTATE AND CALCIUM CHLORIDE: 600; 310; 30; 20 INJECTION, SOLUTION INTRAVENOUS at 10:25

## 2019-01-02 RX ADMIN — BUPIVACAINE HYDROCHLORIDE IN DEXTROSE 1.4 ML: 7.5 INJECTION, SOLUTION SUBARACHNOID at 10:50

## 2019-01-02 RX ADMIN — SODIUM CHLORIDE, POTASSIUM CHLORIDE, SODIUM LACTATE AND CALCIUM CHLORIDE: 600; 310; 30; 20 INJECTION, SOLUTION INTRAVENOUS at 17:13

## 2019-01-02 RX ADMIN — CEFAZOLIN 1 G: 1 INJECTION, POWDER, FOR SOLUTION INTRAMUSCULAR; INTRAVENOUS at 12:35

## 2019-01-02 RX ADMIN — CEFAZOLIN SODIUM 2 G: 2 INJECTION, SOLUTION INTRAVENOUS at 21:03

## 2019-01-02 RX ADMIN — BUPIVACAINE 10 ML: 13.3 INJECTION, SUSPENSION, LIPOSOMAL INFILTRATION at 09:35

## 2019-01-02 RX ADMIN — ONDANSETRON 4 MG: 2 INJECTION INTRAMUSCULAR; INTRAVENOUS at 12:42

## 2019-01-02 RX ADMIN — LIDOCAINE HYDROCHLORIDE 40 MG: 20 INJECTION, SOLUTION INFILTRATION; PERINEURAL at 10:53

## 2019-01-02 RX ADMIN — PHENYLEPHRINE HYDROCHLORIDE 50 MCG: 10 INJECTION, SOLUTION INTRAMUSCULAR; INTRAVENOUS; SUBCUTANEOUS at 11:30

## 2019-01-02 RX ADMIN — FENTANYL CITRATE 25 MCG: 50 INJECTION, SOLUTION INTRAMUSCULAR; INTRAVENOUS at 14:48

## 2019-01-02 RX ADMIN — INSULIN ASPART 2 UNITS: 100 INJECTION, SOLUTION INTRAVENOUS; SUBCUTANEOUS at 18:57

## 2019-01-02 RX ADMIN — FENTANYL CITRATE 25 MCG: 50 INJECTION, SOLUTION INTRAMUSCULAR; INTRAVENOUS at 09:37

## 2019-01-02 RX ADMIN — OMEPRAZOLE 20 MG: 20 CAPSULE, DELAYED RELEASE ORAL at 17:59

## 2019-01-02 RX ADMIN — DEXAMETHASONE SODIUM PHOSPHATE 4 MG: 4 INJECTION, SOLUTION INTRAMUSCULAR; INTRAVENOUS at 11:01

## 2019-01-02 RX ADMIN — ACETAMINOPHEN 975 MG: 325 TABLET, FILM COATED ORAL at 08:47

## 2019-01-02 RX ADMIN — CLONAZEPAM 1 MG: 0.5 TABLET ORAL at 22:19

## 2019-01-02 RX ADMIN — GABAPENTIN 100 MG: 100 CAPSULE ORAL at 08:47

## 2019-01-02 ASSESSMENT — ACTIVITIES OF DAILY LIVING (ADL)
NUMBER_OF_TIMES_PATIENT_HAS_FALLEN_WITHIN_LAST_SIX_MONTHS: 4
TRANSFERRING: 0-->INDEPENDENT
RETIRED_COMMUNICATION: 0-->UNDERSTANDS/COMMUNICATES WITHOUT DIFFICULTY
SWALLOWING: 0-->SWALLOWS FOODS/LIQUIDS WITHOUT DIFFICULTY
COGNITION: 0 - NO COGNITION ISSUES REPORTED
RETIRED_EATING: 0-->INDEPENDENT
AMBULATION: 1-->ASSISTIVE EQUIPMENT
DRESS: 0-->INDEPENDENT
TOILETING: 0-->INDEPENDENT
ADLS_ACUITY_SCORE: 16
ADLS_ACUITY_SCORE: 16
WHICH_OF_THE_ABOVE_FUNCTIONAL_RISKS_HAD_A_RECENT_ONSET_OR_CHANGE?: AMBULATION
BATHING: 2-->ASSISTIVE PERSON
FALL_HISTORY_WITHIN_LAST_SIX_MONTHS: YES

## 2019-01-02 ASSESSMENT — MIFFLIN-ST. JEOR: SCORE: 1695.56

## 2019-01-02 ASSESSMENT — LIFESTYLE VARIABLES: TOBACCO_USE: 0

## 2019-01-02 NOTE — LETTER
Transition Communication Hand-off for Care Transitions to Next Level of Care Provider    Name: Chase Zhao  : 1945  MRN #: [id number]  Primary Care Provider: Madhu Miller     Primary Clinic: [facility] [address] [place] [address]     Reason for Hospitalization:  Status post knee surgery [Z98.890]  Admit Date/Time: 2019  7:14 AM  Discharge Date: 19  Payor Source: Payor: MEDICARE / Plan: MEDICARE / Product Type: Medicare /              Reason for Communication Hand-off Referral:     Discharge Plan:    Social Work Services Discharge Note     Patient Name:  Chase Zhao     Anticipated Discharge Date:  19     Discharge Disposition:   [facility] ([phone])     Following MD:  Facility Assignment     Pre-Admission Screening (PAS) online form has been completed.  The Level of Care (LOC) is:  Determined  Confirmation Code is:  [id number].  Patient/caregiver informed of referral to [facility] Line for Pre-Admission Screening for skilled nursing facility (SNF) placement and to expect a phone call post discharge from SNF.     Additional Services/Equipment Arranged:  Dr. Petersen confirmed readiness for discharge.  Admissions at [facility] ([place]) confirmed acceptance for admission.  BRIGIDA spoke with pt's floor nurse (Nori) who indicates that pt can be transported by w/c.  BRIGIDA arranged for [employer] (Carleen [phone]) to provide private pay w/c transport at 1:30pm.       Patient / Family response to discharge plan:  Pt and wife voice understanding of the discharge plan and agreement with the discharge plan.     Persons notified of above discharge plan:  Pt, wife, 6A nursing, Dr. Petersen and Dr. Cruz.     Staff Discharge Instructions:  Please fax discharge orders and signed hard scripts for any controlled substances (SW will complete this task).  Please print a packet and send with patient.      CTS Handoff completed:  YES     Medicare Notice of Rights  provided to the patient/family:  YES     MANFRED Mccarty  Social Work, 6A  Phone:  [phone]  Pager:  [phone]  1/7/2019

## 2019-01-02 NOTE — PHARMACY-ANTICOAGULATION SERVICE
Clinical Pharmacy - Warfarin Dosing Consult     Pharmacy has been consulted to manage this patient s warfarin therapy.       INR   Date Value Ref Range Status   01/02/2019 0.99 0.86 - 1.14 Final       Recommend warfarin 5 mg today.  Pharmacy will monitor Chase Zhao daily and order warfarin doses to achieve specified goal.      Please contact pharmacy as soon as possible if the warfarin needs to be held for a procedure or if the warfarin goals change.

## 2019-01-02 NOTE — ANESTHESIA PROCEDURE NOTES
Peripheral Nerve Block Procedure Note    Staff:     Anesthesiologist:  Les Owens DO  Location: Pre-op  Procedure Start/Stop TImes:      1/2/2019 9:30 AM     1/2/2019 9:35 AM    patient identified, IV checked, site marked, risks and benefits discussed, informed consent, monitors and equipment checked, pre-op evaluation, at physician/surgeon's request and post-op pain management      Correct Patient: Yes      Correct Position: Yes      Correct Site: Yes      Correct Procedure: Yes      Correct Laterality:  Yes    Site Marked:  Yes  Procedure details:     Procedure:  Adductor canal    ASA:  3    Diagnosis:  Post op pain    Laterality:  Right    Position:  Supine    Sterile Prep: chloraprep, mask and sterile gloves      Local skin infiltration:  None    Needle:  Short bevel    Needle gauge:  21    Needle length (inches):  4    Ultrasound: Yes      Ultrasound used to identify targeted nerve, plexus, or vascular structure and placed a needle adjacent to it      Permanent Image entered into patiient's record      Abnormal pain on injection: No      Blood Aspirated: No      Paresthesias:  No    Bleeding at site: No      Bolus via:  Needle    Infusion Method:  Single Shot    Complications:  None  Assessment/Narrative:     Injection made incrementally with aspirations every (mL):  5     Informed consent obtained.  All risks and benefits of the nerve block discussed with the patient.  All questions answered and all parties agreed with the plan.   Block was placed at the surgeon's request for post operative pain control.

## 2019-01-02 NOTE — ANESTHESIA PREPROCEDURE EVALUATION
Anesthesia Pre-Procedure Evaluation    Patient: Chase Zhao   MRN:     [id number] Gender:   male   Age:    73 year old :      1945        Preoperative Diagnosis: Right Knee Osteoarthritis, Chronic Right Knee Pain   Procedure(s):  Right  Total Knee Arthroplasty     Past Medical History:   Diagnosis Date     Depression      Diabetes (H)      Hearing loss     wears hearing aid     History of blood clots      History of stroke      Hypertension      Stented coronary artery       Past Surgical History:   Procedure Laterality Date     ARTHROSCOPY KNEE Right      AS REVISE TOTAL HIP REPLACEMENT       CA ANESTH DX WRIST ARTHROSCOPY       CARDIAC SURGERY      coronary artery bypass     history of gallbladder surgery       history of heart valve replacement       history of hernia repair       history of peripheral vascular replacement surgery            Anesthesia Evaluation     . Pt has had prior anesthetic.     No history of anesthetic complications          ROS/MED HX    ENT/Pulmonary:  - neg pulmonary ROS    (-) tobacco use   Neurologic:     (+)CVA other neuro (Hard of hearing) Hard of hearing    Cardiovascular:     (+) hypertension--CAD, --stent,. Taking blood thinners : Instructions Given to patient: bridge with lovenox. . . :. . Previous cardiac testing Echodate:results:No change, ER 55%Stress Testdate: results:Small apical defect, consistent with stress test 3 years ago date: results: date: results:          METS/Exercise Tolerance:  1 - Eating, dressing   Hematologic:     (+) History of blood clots pt is anticoagulated, -      Musculoskeletal:  - neg musculoskeletal ROS       GI/Hepatic:  - neg GI/hepatic ROS       Renal/Genitourinary:  - ROS Renal section negative       Endo:  - neg endo ROS   (+) type II DM .      Psychiatric:     (+) psychiatric history depression      Infectious Disease:  - neg infectious disease ROS       Malignancy:      - no malignancy   Other:    - neg other ROS  "                    PHYSICAL EXAM:   Mental Status/Neuro: A/A/O   Airway: Facies: Feasible  Mallampati: II  Mouth/Opening: Full  TM distance: > 6 cm  Neck ROM: Full   Respiratory: Auscultation: CTAB     Resp. Rate: Normal     Resp. Effort: Normal      CV: Rhythm: Regular  Rate: Age appropriate  Heart: Normal Sounds   Comments:      Dental: Normal                  No results found for: WBC, HGB, HCT, PLT, CRP, SED, NA, POTASSIUM, CHLORIDE, CO2, BUN, CR, GLC, BRAYDEN, PHOS, MAG, ALBUMIN, PROTTOTAL, ALT, AST, GGT, ALKPHOS, BILITOTAL, BILIDIRECT, LIPASE, AMYLASE, SRINI, PTT, INR, FIBR, TSH, T4, T3, HCG, HCGS, CKTOTAL, CKMB, TROPN    Preop Vitals  BP Readings from Last 3 Encounters:   01/02/19 130/77   10/02/18 113/67   09/18/18 126/73    Pulse Readings from Last 3 Encounters:   01/02/19 70   10/02/18 64   09/18/18 63      Resp Readings from Last 3 Encounters:   01/02/19 24    SpO2 Readings from Last 3 Encounters:   01/02/19 97%   10/02/18 94%   09/18/18 94%      Temp Readings from Last 1 Encounters:   01/02/19 36.6  C (97.9  F) (Oral)    Ht Readings from Last 1 Encounters:   01/02/19 1.715 m (5' 7.5\")      Wt Readings from Last 1 Encounters:   01/02/19 99.4 kg (219 lb 2.2 oz)    Estimated body mass index is 33.82 kg/m  as calculated from the following:    Height as of this encounter: 1.715 m (5' 7.5\").    Weight as of this encounter: 99.4 kg (219 lb 2.2 oz).     LDA:            Assessment:   ASA SCORE: 3    NPO Status: > 6 hours since completed Solid [employer]   Documentation: H&P complete; Preop Testing complete; Consents complete   Proceeding: Proceed without further delay  Tobacco Use:  NO Active use of Tobacco/UNKNOWN Tobacco use status     Plan:   Anes. Type:  Regional; MAC     RA Details:  Pre Induction; SS     RA-Location/Type: Spinal   Pre-Induction: Acetaminophen PO   Induction:  IV (Standard)   Airway: Native Airway   Access/Monitoring: PIV   Maintenance: Propofol; IV   Emergence: Procedure Site   Logistics: Same Day " Surgery     Postop Pain/Sedation Strategy:  Standard-Options: Opioids PRN; Exparel; Block SS (Adductor canal block for post op pain control in pre-op)     PONV Management:  Adult Risk Factors:, Non-Smoker, Postop Opioids  Prevention: Propofol Infusion; Ondansetron     CONSENT: Direct conversation   Plan and risks discussed with: Patient   Blood Products: Consented (ALL Blood Products)                         Les Owens DO

## 2019-01-02 NOTE — ANESTHESIA PROCEDURE NOTES
Spinal/LP Procedure Note    Spinal Block  Staff:     Anesthesiologist:  Les Owens DO  Location: In OR BEFORE Induction  Procedure Start/Stop Times:      1/2/2019 10:45 AM     1/2/2019 10:50 AM    patient identified, IV checked, site marked, risks and benefits discussed, informed consent, monitors and equipment checked, pre-op evaluation, at physician/surgeon's request and post-op pain management      Correct Patient: Yes      Correct Position: Yes      Correct Site: Yes      Correct Procedure: Yes      Correct Laterality:  Yes    Site Marked:  Yes  Procedure:     Procedure:  Intrathecal    ASA:  3    Diagnosis:  TKA    Position:  Sitting    Sterile Prep: chloraprep, mask, sterile gloves and patient draped      Insertion site:  L3-4    Approach:  Midline    Needle Type:  Omer    Needle gauge (G):  25    Local Skin Infiltration:  2% lidocaine    amount (ml):  4    Needle Length (in):  3.5    Introducer used: Yes      Introducer gauge:  20 G    Attempts:  2    Redirects:  1    CSF:  Clear    Paresthesias:  No    Time injected:  10:50  Assessment/Narrative:     Sensory Level:  T12

## 2019-01-02 NOTE — OR NURSING
PACU to Inpatient Nursing Handoff    Patient Chase Zhao is a 73 year old male who speaks English.   Procedure Procedure(s):  Right  Total Knee Arthroplasty   Surgeon(s) Primary: Marco Cruz MD  Assisting: Christiano Espinosa PA-C  Resident - Assisting: Neto Stephen MD     Not on File    Isolation  [unfilled]     Past Medical History   has a past medical history of Depression, Depressive disorder, Diabetes (H), Hearing loss, History of blood clots, History of stroke, Hypertension, and Stented coronary artery.    Anesthesia Spinal   Dermatome Level Dermatomes Left: S1  Dermatomes Right: S1   Preop Meds acetaminophen (Tylenol) - time given: 0847  celecoxib (Celebrex) - time given: 0847  gabapentin (Neurontin) - time given: 0847   Nerve block Adductor canal.  Location:right. Med:Exparel (liposomal bupivacaine). Time given: 0930   Intraop Meds dexamethasone (Decadron)  fentanyl (Sublimaze): 25 mcg total  ondansetron (Zofran): last given at 1242   Local Meds Yes - Local Cocktail (morphine, ropivacaine, epinephrine, Toradol)   Antibiotics cefazolin (Ancef) - last given at 1235     Pain Patient Currently in Pain: yes  Comfort: comfortably manageable  Pain Control: partially effective   PACU meds  fentanyl (Sublimaze): 25 mcg (total dose) last given at 1448    PCA / epidural No   Capnography Respiratory Monitoring (EtCO2): 34 mmHg  Integrated Pulmonary Index (IPI): 8-9   Telemetry ECG Rhythm: Normal sinus rhythm   Inpatient Telemetry Monitor Ordered? No        Labs Glucose No results found for: GLC    Hgb Lab Results   Component Value Date    HGB 15.6 01/02/2019       INR Lab Results   Component Value Date    INR 0.99 01/02/2019      PACU Imaging Completed     Wound/Incision Incision/Surgical Site 01/02/19 Right Knee (Active)   Incision Assessment UTV 1/2/2019  1:31 PM   Closure CRISTINA;Adhesive strip(s);Liquid bandage 1/2/2019  1:31 PM   Incision Drainage Amount None 1/2/2019  1:31 PM   Dressing  Intervention Clean, dry, intact 1/2/2019  1:31 PM   Number of days: 0      CMS        Equipment ice pack and continuous passive motion machine (CPM)   Other LDA       IV Access Peripheral IV 01/02/19 Right;Dorsal Hand (Active)   Site Assessment WDL 1/2/2019  1:31 PM   Line Status Infusing 1/2/2019  1:31 PM   Phlebitis Scale 0-->no symptoms 1/2/2019  1:31 PM   Infiltration Scale 0 1/2/2019  1:31 PM   Number of days: 0      Blood Products Not applicable EBL 50   mL   Intake/Output Date 01/02/19 0700 - 01/03/19 0659   Shift 8875-1211 8043-5719 7792-4880 24 Hour Total   INTAKE   P.O. 120   120   I.V. 800   800   Shift Total(mL/kg) 920(9.26)   920(9.26)   OUTPUT   Shift Total(mL/kg)       Weight (kg) 99.4 99.4 99.4 99.4      Drains / [place]     Time of void PreOp Void Prior to Procedure: 0849 (01/02/19 0841)    PostOp      Diapered? No   Bladder Scan Bladder Scan Volume (mL): 414 ml (01/02/19 1345)    mL(water) (01/02/19 1400)  tolerating sips     Vitals    B/P: 136/79  T: 98.6  F (37  C)    Temp src: Oral  P:  Pulse: 71 (01/02/19 1430)    Heart Rate: 80 (01/02/19 1430)     R: 18  O2:  SpO2: 93 %    O2 Device: Nasal cannula (01/02/19 1400)    Oxygen Delivery: 3 LPM (01/02/19 1400)         Family/support present significant other   Patient belongings     Patient transported on bed and air mat   DC meds/scripts (obs/outpt) Not applicable   Inpatient Pain Meds Released? Yes       Special needs/considerations [place], wears hearing aids; left eye blind   Tasks needing completion None       Laura Martin RN  ASCOM [id number]

## 2019-01-02 NOTE — BRIEF OP NOTE
[facility], [place]    Brief Operative Note    Pre-operative diagnosis: Right Knee Osteoarthritis, Chronic Right Knee Pain  Post-operative diagnosis * No post-op diagnosis entered *  Procedure: Procedure(s):  Right  Total Knee Arthroplasty  Surgeon: Surgeon(s) and Role:     * Marco Cruz MD - Primary     * Christiano Espinosa PA-C - Assisting     * Neto Stephen MD - Resident - Assisting  Anesthesia: Spinal   Estimated blood loss: Less than 100 ml  Drains: None  Specimens: * No specimens in log *  Findings:   None.  Complications: None.    Orthopaedics Primary; Hospitalist consult  Activity: up a jag  Weight bearing status: WBAT  Antibiotics: Ancef x 24 hours  Diet: regular  DVT prophylaxis: Lovenox bridge to warfarin  Bracing/Splinting: none  Dressings: Keep clean, dry and intact x 7 days post-op  Elevation: Elevate RLE on pillows to keep at the level of the heart while in bed  Drains: none  Physical Therapy/Occupational Therapy: Eval and treat for mobilization and ambulation    Follow-up: Clinic with Dr. Cruz in 2 weeks.    Disposition: Pending progress with therapies, pain control on orals, and medical stability, anticipate discharge to home 2-3 days    Neto Stephen MD  Orthopaedic Resident, PGY-4  Pager: [phone]

## 2019-01-02 NOTE — ANESTHESIA CARE TRANSFER NOTE
Patient: Chase Zhao    Procedure(s):  Right  Total Knee Arthroplasty    Diagnosis: Right Knee Osteoarthritis, Chronic Right Knee Pain  Diagnosis Additional Information: No value filed.    Anesthesia Type:   No value filed.     Note:  Airway :Face Mask  Patient transferred to:PACU  Handoff Report: Identifed the Patient, Identified the Reponsible Provider, Reviewed the pertinent medical history, Discussed the surgical course, Reviewed Intra-OP anesthesia mangement and issues during anesthesia, Set expectations for post-procedure period and Allowed opportunity for questions and acknowledgement of understanding      Vitals: (Last set prior to Anesthesia Care Transfer)    CRNA VITALS  1/2/2019 1305 - 1/2/2019 1335      1/2/2019             Resp Rate (observed):  1  (Abnormal)                 Electronically Signed By: VITO Wynn CRNA  January 2, 2019  1:35 PM

## 2019-01-02 NOTE — ANESTHESIA POSTPROCEDURE EVALUATION
Anesthesia POST Procedure Evaluation    Patient: Chase Zhao   MRN:     [id number] Gender:   male   Age:    73 year old :      1945        Preoperative Diagnosis: Right Knee Osteoarthritis, Chronic Right Knee Pain   Procedure(s):  Right  Total Knee Arthroplasty   Postop Comments: No value filed.       Anesthesia Type:  Regional, MAC    Reportable Event: NO     PAIN: Uncomplicated   Sign Out status: Comfortable, Well controlled pain     PONV: No PONV   Sign Out status:  No Nausea or Vomiting     Neuro/Psych: Uneventful perioperative course   Sign Out Status: Preoperative baseline; Age appropriate mentation     Airway/Resp.: Uneventful perioperative course   Sign Out Status: Non labored breathing, age appropriate RR; Resp. Status within EXPECTED Parameters     CV: Uneventful perioperative course   Sign Out status: Appropriate BP and perfusion indices; Appropriate HR/Rhythm     Disposition:   Sign Out in:  PACU  Disposition:  Floor  Recovery Course: Uneventful  Follow-Up: Not required           Last Anesthesia Record Vitals:  CRNA VITALS  2019 1305 - 2019 1405      2019             Resp Rate (observed):  1  (Abnormal)           Last PACU/Preop Vitals:  Vitals:    19 0734 19 0924 19 1334   BP: 130/77 129/80 136/78   Pulse: 70     Resp: 24 22 18   Temp: 36.6  C (97.9  F)  37.3  C (99.1  F)   SpO2: 97% 97% 98%         Electronically Signed By: Les Owens DO, 2019, 2:15 PM

## 2019-01-03 ENCOUNTER — APPOINTMENT (OUTPATIENT)
Dept: OCCUPATIONAL THERAPY | Facility: CLINIC | Age: 74
DRG: 470 | End: 2019-01-03
Attending: ORTHOPAEDIC SURGERY
Payer: MEDICARE

## 2019-01-03 ENCOUNTER — APPOINTMENT (OUTPATIENT)
Dept: PHYSICAL THERAPY | Facility: CLINIC | Age: 74
DRG: 470 | End: 2019-01-03
Attending: ORTHOPAEDIC SURGERY
Payer: MEDICARE

## 2019-01-03 LAB
ANION GAP SERPL CALCULATED.3IONS-SCNC: 6 MMOL/L (ref 3–14)
BUN SERPL-MCNC: 21 MG/DL (ref 7–30)
CALCIUM SERPL-MCNC: 8.1 MG/DL (ref 8.5–10.1)
CHLORIDE SERPL-SCNC: 106 MMOL/L (ref 94–109)
CO2 SERPL-SCNC: 27 MMOL/L (ref 20–32)
CREAT SERPL-MCNC: 0.89 MG/DL (ref 0.66–1.25)
GFR SERPL CREATININE-BSD FRML MDRD: 83 ML/MIN/{1.73_M2}
GLUCOSE BLDC GLUCOMTR-MCNC: 112 MG/DL (ref 70–99)
GLUCOSE BLDC GLUCOMTR-MCNC: 135 MG/DL (ref 70–99)
GLUCOSE BLDC GLUCOMTR-MCNC: 141 MG/DL (ref 70–99)
GLUCOSE BLDC GLUCOMTR-MCNC: 156 MG/DL (ref 70–99)
GLUCOSE BLDC GLUCOMTR-MCNC: 230 MG/DL (ref 70–99)
GLUCOSE SERPL-MCNC: 216 MG/DL (ref 70–99)
HGB BLD-MCNC: 13.5 G/DL (ref 13.3–17.7)
INR PPP: 1.1 (ref 0.86–1.14)
MAGNESIUM SERPL-MCNC: 1.8 MG/DL (ref 1.6–2.3)
POTASSIUM SERPL-SCNC: 4.2 MMOL/L (ref 3.4–5.3)
SODIUM SERPL-SCNC: 139 MMOL/L (ref 133–144)

## 2019-01-03 PROCEDURE — A9270 NON-COVERED ITEM OR SERVICE: HCPCS | Mod: GY | Performed by: STUDENT IN AN ORGANIZED HEALTH CARE EDUCATION/TRAINING PROGRAM

## 2019-01-03 PROCEDURE — 25000132 ZZH RX MED GY IP 250 OP 250 PS 637: Mod: GY | Performed by: STUDENT IN AN ORGANIZED HEALTH CARE EDUCATION/TRAINING PROGRAM

## 2019-01-03 PROCEDURE — A9270 NON-COVERED ITEM OR SERVICE: HCPCS | Mod: GY

## 2019-01-03 PROCEDURE — 00000146 ZZHCL STATISTIC GLUCOSE BY METER IP

## 2019-01-03 PROCEDURE — 25000132 ZZH RX MED GY IP 250 OP 250 PS 637: Mod: GY | Performed by: INTERNAL MEDICINE

## 2019-01-03 PROCEDURE — 97530 THERAPEUTIC ACTIVITIES: CPT | Mod: GO | Performed by: OCCUPATIONAL THERAPIST

## 2019-01-03 PROCEDURE — 99233 SBSQ HOSP IP/OBS HIGH 50: CPT | Performed by: INTERNAL MEDICINE

## 2019-01-03 PROCEDURE — 36415 COLL VENOUS BLD VENIPUNCTURE: CPT | Performed by: STUDENT IN AN ORGANIZED HEALTH CARE EDUCATION/TRAINING PROGRAM

## 2019-01-03 PROCEDURE — 80048 BASIC METABOLIC PNL TOTAL CA: CPT | Performed by: STUDENT IN AN ORGANIZED HEALTH CARE EDUCATION/TRAINING PROGRAM

## 2019-01-03 PROCEDURE — 25000128 H RX IP 250 OP 636: Performed by: NURSE PRACTITIONER

## 2019-01-03 PROCEDURE — 40000193 ZZH STATISTIC PT WARD VISIT

## 2019-01-03 PROCEDURE — 85610 PROTHROMBIN TIME: CPT | Performed by: STUDENT IN AN ORGANIZED HEALTH CARE EDUCATION/TRAINING PROGRAM

## 2019-01-03 PROCEDURE — 25000128 H RX IP 250 OP 636: Performed by: STUDENT IN AN ORGANIZED HEALTH CARE EDUCATION/TRAINING PROGRAM

## 2019-01-03 PROCEDURE — 99231 SBSQ HOSP IP/OBS SF/LOW 25: CPT | Performed by: NURSE PRACTITIONER

## 2019-01-03 PROCEDURE — 97535 SELF CARE MNGMENT TRAINING: CPT | Mod: GO | Performed by: OCCUPATIONAL THERAPIST

## 2019-01-03 PROCEDURE — 25000125 ZZHC RX 250: Performed by: INTERNAL MEDICINE

## 2019-01-03 PROCEDURE — 99207 ZZC CDG-MDM COMPONENT: MEETS MODERATE - UP CODED: CPT | Performed by: INTERNAL MEDICINE

## 2019-01-03 PROCEDURE — 97165 OT EVAL LOW COMPLEX 30 MIN: CPT | Mod: GO | Performed by: OCCUPATIONAL THERAPIST

## 2019-01-03 PROCEDURE — 97161 PT EVAL LOW COMPLEX 20 MIN: CPT | Mod: GP

## 2019-01-03 PROCEDURE — 97116 GAIT TRAINING THERAPY: CPT | Mod: GP

## 2019-01-03 PROCEDURE — A9270 NON-COVERED ITEM OR SERVICE: HCPCS | Mod: GY | Performed by: INTERNAL MEDICINE

## 2019-01-03 PROCEDURE — 83735 ASSAY OF MAGNESIUM: CPT | Performed by: STUDENT IN AN ORGANIZED HEALTH CARE EDUCATION/TRAINING PROGRAM

## 2019-01-03 PROCEDURE — 97110 THERAPEUTIC EXERCISES: CPT | Mod: GP

## 2019-01-03 PROCEDURE — 85018 HEMOGLOBIN: CPT | Performed by: STUDENT IN AN ORGANIZED HEALTH CARE EDUCATION/TRAINING PROGRAM

## 2019-01-03 PROCEDURE — 25000132 ZZH RX MED GY IP 250 OP 250 PS 637: Mod: GY

## 2019-01-03 PROCEDURE — 25000128 H RX IP 250 OP 636: Performed by: INTERNAL MEDICINE

## 2019-01-03 PROCEDURE — 12000001 ZZH R&B MED SURG/OB UMMC

## 2019-01-03 PROCEDURE — 40000133 ZZH STATISTIC OT WARD VISIT: Performed by: OCCUPATIONAL THERAPIST

## 2019-01-03 RX ORDER — OXYCODONE HYDROCHLORIDE 5 MG/1
2.5-5 TABLET ORAL
Qty: 40 TABLET | Refills: 0 | Status: SHIPPED | OUTPATIENT
Start: 2019-01-03 | End: 2019-01-07

## 2019-01-03 RX ORDER — ACETAMINOPHEN 325 MG/1
650 TABLET ORAL EVERY 4 HOURS PRN
Qty: 30 TABLET | Refills: 0 | Status: SHIPPED | OUTPATIENT
Start: 2019-01-05 | End: 2019-01-04

## 2019-01-03 RX ORDER — WARFARIN SODIUM 5 MG/1
5 TABLET ORAL
Status: COMPLETED | OUTPATIENT
Start: 2019-01-03 | End: 2019-01-03

## 2019-01-03 RX ADMIN — ACETAMINOPHEN 975 MG: 325 TABLET, FILM COATED ORAL at 00:18

## 2019-01-03 RX ADMIN — LIRAGLUTIDE 1.8 MG: 6 INJECTION SUBCUTANEOUS at 08:37

## 2019-01-03 RX ADMIN — ENOXAPARIN SODIUM 30 MG: 30 INJECTION SUBCUTANEOUS at 20:40

## 2019-01-03 RX ADMIN — ACETAMINOPHEN 975 MG: 325 TABLET, FILM COATED ORAL at 08:37

## 2019-01-03 RX ADMIN — Medication 2.5 MG: at 16:50

## 2019-01-03 RX ADMIN — METOPROLOL TARTRATE 25 MG: 25 TABLET, FILM COATED ORAL at 20:40

## 2019-01-03 RX ADMIN — OMEPRAZOLE 20 MG: 20 CAPSULE, DELAYED RELEASE ORAL at 08:37

## 2019-01-03 RX ADMIN — CLONAZEPAM 1 MG: 0.5 TABLET ORAL at 21:40

## 2019-01-03 RX ADMIN — CITALOPRAM HYDROBROMIDE 40 MG: 40 TABLET ORAL at 08:37

## 2019-01-03 RX ADMIN — METOPROLOL TARTRATE 25 MG: 25 TABLET, FILM COATED ORAL at 08:36

## 2019-01-03 RX ADMIN — CEFAZOLIN SODIUM 2 G: 2 INJECTION, SOLUTION INTRAVENOUS at 05:07

## 2019-01-03 RX ADMIN — DEXTRAN 70 AND HYPROMELLOSE 2910 1 DROP: 1; 3 SOLUTION/ DROPS OPHTHALMIC at 22:30

## 2019-01-03 RX ADMIN — SENNOSIDES AND DOCUSATE SODIUM 2 TABLET: 8.6; 5 TABLET ORAL at 08:36

## 2019-01-03 RX ADMIN — WARFARIN SODIUM 5 MG: 5 TABLET ORAL at 18:54

## 2019-01-03 RX ADMIN — SODIUM CHLORIDE, POTASSIUM CHLORIDE, SODIUM LACTATE AND CALCIUM CHLORIDE: 600; 310; 30; 20 INJECTION, SOLUTION INTRAVENOUS at 00:15

## 2019-01-03 RX ADMIN — INSULIN ASPART 1 UNITS: 100 INJECTION, SOLUTION INTRAVENOUS; SUBCUTANEOUS at 08:38

## 2019-01-03 RX ADMIN — ENOXAPARIN SODIUM 30 MG: 30 INJECTION SUBCUTANEOUS at 09:26

## 2019-01-03 RX ADMIN — ASPIRIN 81 MG: 81 TABLET, COATED ORAL at 08:37

## 2019-01-03 RX ADMIN — ACETAMINOPHEN 975 MG: 325 TABLET, FILM COATED ORAL at 16:05

## 2019-01-03 RX ADMIN — ROSUVASTATIN CALCIUM 40 MG: 20 TABLET, FILM COATED ORAL at 21:40

## 2019-01-03 ASSESSMENT — ACTIVITIES OF DAILY LIVING (ADL)
ADLS_ACUITY_SCORE: 15
ADLS_ACUITY_SCORE: 19
ADLS_ACUITY_SCORE: 15
ADLS_ACUITY_SCORE: 15
ADLS_ACUITY_SCORE: 19
ADLS_ACUITY_SCORE: 19

## 2019-01-03 NOTE — PLAN OF CARE
VS: Temp: 96  F (35.6  C) Temp src: Oral BP: 126/63 Pulse: 75 Heart Rate: 70 Resp: 14 SpO2: 99 % O2 Device: BiPAP/CPAP    O2: CPAP machine overnight   Output: Voided 250 mLs in urinal at edge of bed.   Last BM: 1/2/19 in AM   Activity: Up to edge of bed   Skin: Intact except for surgical incision right knee   Pain: Tolerable with ice and scheduled tylenol   CMS: Intact, denies numbness and tingling   Dressing: CDI   Diet: Regular   LDA: Scheduled inovolog nsulin sliding scale, carb coverage AC & HS. Scheduled Humulin N BID   Equipment: CPM, IV pump and pole, CPAP machine, PCDs, walker   Plan: Continue to monitor   Additional Info:

## 2019-01-03 NOTE — PLAN OF CARE
Discharge Planner PT   Patient plan for discharge: Open to home vs rehab  Current status: PT evaluation complete and treatment initiated. Overall doing well. Completes supine<>sit transfer with HOB slightly elevated and CGA to R LE only. Sat EOB with good tolerance. Completes sit<>stand transfer with CGA and use of walker. Ambulated in lama with CGA and use of walker, mild instability noted. Patient [place] and limited by visual deficits. Also with history of falls.  Barriers to return to prior living situation: safety, independence, pain, decreased R LE strength and ROM, impaired balance, impaired activity tolerance  Recommendations for discharge: Rehab  Rationale for recommendations: To progress safety and independence with functional mobility prior to returning home       Entered by: Amrita Kim 01/03/2019 10:11 AM

## 2019-01-03 NOTE — PLAN OF CARE
"Discharge Planner OT   Patient plan for discharge: Prefers to discharge home; is agreeable to TCU if this is recommended.  Spouse/son present and spouse reports she prefers patient discharge to TCU as she is concerned with mobility.  Does report assisting patient with ADLs at home, reporting \"I probably help too much\".    Current status: Patient limited by vision/hearing deficits, pleasant and very motivated.  Patient ambulated 200' with CGA with walker, completed donning/doffing socks with SBA,  and completed toilet transfer with CGA.  Barriers to return to prior living situation: Fall history (4 falls in last 6 months, one in the tub), decreased ROM/strength  Recommendations for discharge: TCU; however patient may improve to discharge home.  Rationale for recommendations: Continued daily OT for maximum independence in ADLs/mobility.       Entered by: Christine Hodge 01/03/2019 12:36 PM       "

## 2019-01-03 NOTE — PLAN OF CARE
1/2 16:00-23:30  VS: stable   O2 Sats mid 90's on room air  Lungs CTA, denies chest pain/SOB  CAPNO WDL  off at HS, using home CPAP   Output: Straight cathed in PACU  Voided 200 ml @ 20:50 PVR was 243   Bowels/BM Last BM 1/2 AM  BS sluggish, not passing gas yet   Activity Stood at bedside with assist of 2, gait belt and walker   Skin: Intact except for surgical incision right knee   Pain: Rating pain @ 2 with activity.  Scheduled tylenol and ice for pain   CMS: Some numbness left foot/ankle  Denies tingling   Dressing: Right knee dressing CDI   Diet: Regular  BG AC & HS  Scheduled inovolog nsulin sliding scale, carb coverage AC & HS  Scheduled Humulin N BID   LDA: PIV right hand, infusing, LR 75/hr   Equipment: Home CPAP  CPM  PCD's  IV pump and pole  Walker & gait belt   Plan: TBD   Additional Info: Hx of 4 falls this past 6 months  Wife staying in room with pt

## 2019-01-03 NOTE — PROGRESS NOTES
REGIONAL ANESTHESIA PAIN SERVICE  SUBJECTIVE  Interval history: Patient reports pain is well controlled with nerve block and current analgesics (see below).  Currently rating pain 1/10 at rest and 2/10 with activity.  Tolerating a diet,  denies nausea.  Denies any weakness, paresthesias, circumoral numbness, metallic taste or tinnitus.       Clinically Aligned Pain Assessment (CAPA):  Comfort (How is your pain?): Comfortably manageable  Change in Pain (Since your last medication/intervention?): About the same  Pain Control (How are your pain treatments working?):  Fully effective pain control    Medications related to Pain Management (From now, onward)    Start     Dose/Rate Route Frequency Ordered Stop    01/05/19 0000  acetaminophen (TYLENOL) tablet 650 mg      650 mg Oral EVERY 4 HOURS PRN 01/02/19 1542      01/05/19 0000  acetaminophen (TYLENOL) 325 MG tablet      650 mg Oral EVERY 4 HOURS PRN 01/03/19 0749 02/04/19 2359    01/03/19 0800  bisacodyl (DULCOLAX) Suppository 10 mg      10 mg Rectal DAILY 01/02/19 1542      01/03/19 0800  aspirin EC tablet 81 mg      81 mg Oral DAILY 01/02/19 1659      01/03/19 0000  oxyCODONE (ROXICODONE) 5 MG tablet      2.5-5 mg Oral EVERY 3 HOURS PRN 01/03/19 0749 02/02/19 2359    01/02/19 2200  clonazePAM (klonoPIN) tablet 1 mg      1 mg Oral AT BEDTIME 01/02/19 1700      01/02/19 1640  HYDROmorphone (DILAUDID) injection 0.2 mg      0.2 mg Intravenous EVERY 2 HOURS PRN 01/02/19 1640      01/02/19 1640  oxyCODONE IR (ROXICODONE) half-tab 2.5-5 mg      2.5-5 mg Oral EVERY 3 HOURS PRN 01/02/19 1640      01/02/19 1545  acetaminophen (TYLENOL) tablet 975 mg      975 mg Oral EVERY 8 HOURS 01/02/19 1542 01/05/19 1659    01/02/19 1542  senna-docusate (SENOKOT-S/PERICOLACE) 8.6-50 MG per tablet 1 tablet      1 tablet Oral 2 TIMES DAILY 01/02/19 1542      01/02/19 1542  senna-docusate (SENOKOT-S/PERICOLACE) 8.6-50 MG per tablet 2 tablet      2 tablet Oral 2 TIMES DAILY 01/02/19 1542       "01/02/19 1542  bupivacaine liposome (EXPAREL) LONG ACTING injection was administered into the infiltration site to produce postsurgical analgesia. Duration of action is up to 72 hours, and other \"barbara\" medications should not be given for 96 hours with the exception of the lidocaine 5% patch (LIDODERM) and the lidocaine 10mg in potassium infusions. This entry is for INFORMATION ONLY.       Does not apply CONTINUOUS PRN 01/02/19 1542 01/06/19 1541          OBJECTIVE:    /64 (BP Location: Right arm)   Pulse 75   Temp 96.5  F (35.8  C) (Oral)   Resp 16   Ht 1.715 m (5' 7.5\")   Wt 99.4 kg (219 lb 2.2 oz)   SpO2 99%   BMI 33.82 kg/m    Exam:  General: alert and no distress  Msk: moves lower extremities spontaneously    ASSESSMENT/PLAN:    Chase Zhao is a 73 year old male with R)knee OA, now POD #1 s/p  ARTHROPLASTY KNEE with single shot injection right adductor canal nerve block.  Total bupivacaine 0.25% with epinephrine 1:200,000 10mL total and liposomal (long-acting) bupivacaine (Exparel) 1.3% 10mL total administered 1/2/19 for postop pain control.  Patient is participating in therapies.  No evidence of adverse side effects associated with nerve block injection.  Acheiving adequate pain control, with nerve block, oral and IV analgesics.  Anticipate 48-72 hours of pain control with long-acting local anesthetic. Patient will continue to require multimodal analgesia for pain not controlled with long-acting local anesthetic.      - NO other local anesthetic use within 96 hours of liposomal bupivacaine (Exparel), unless approved by [facility]  - patient received verbal and written instructions about liposomal bupivacaine and counseling about pharmacologic and nonpharmacologic measures for acute postoperative pain management  - please call [facility] if questions or concerns    VITO Beal CNP  Regional Anesthesia Pain Service ([facility])  1/3/2019 8:21 AM    [facility] Contact Info (for in-house use only):  Job code " ID: [id number]   [facility] 0599  Peds 0602  [place] phone: dial [phone], enter jobcode ID, then enter call-back number.    Text: Use Biodirection on the Intranet <Paging/Directory> tab and enter Jobcode ID.   If no call back at any time, contact the hospital  and ask for [facility] attending or backup

## 2019-01-03 NOTE — PROGRESS NOTES
"Orthopaedic Surgery Progress Note    Subjective: No acute events overnight. Pain controlled.    Denies fever or chills, CP, SOB, numbness or tingling, motor dysfunction or weakness.    Objective: /63 (BP Location: Right arm)   Pulse 75   Temp 96  F (35.6  C) (Oral)   Resp 14   Ht 1.715 m (5' 7.5\")   Wt 99.4 kg (219 lb 2.2 oz)   SpO2 99%   BMI 33.82 kg/m      Physical Exam:  General: healthy, alert, no distress  Respiratory: Breathing not labored.  MSK:  Focused examination of:   RLE: Toes WWP with BCR, + DP pulse. Fires EHL/FHL/GSC/T. SILT SP/DP/Sa/López/T. Dressing c/d/i.    Labs:   Recent Labs   Lab 01/03/19  0551 01/02/19  0813   HGB 13.5 15.6       All cultures:  No results for input(s): CULT in the last 168 hours.    Assessment:  Chase Zhao is a 73 year old male who underwent a right TKA on 1/2.    Orthopaedics Primary; Hospitalist consult  Activity: up a jag  Weight bearing status: WBAT  Antibiotics: Ancef completed 1/3  Diet: regular  DVT prophylaxis: Lovenox bridge to warfarin  Bracing/Splinting: none  Dressings: Keep clean, dry and intact x 7 days post-op  Elevation: Elevate RLE on pillows to keep at the level of the heart while in bed  Drains: none  Physical Therapy/Occupational Therapy: Eval and treat for mobilization and ambulation     Follow-up: Clinic with Dr. Cruz in 2 weeks.     Disposition: Pending progress with therapies, pain control on orals, and medical stability, anticipate discharge to home 1-2 days        Neto Stephen MD  Orthopaedic Resident, PGY-4  Pager: [phone]    "

## 2019-01-03 NOTE — CONSULTS
Social Work: Assessment with Discharge Plan    Patient Name:  Denver Richards  :  1945  Age:  73 year old  MRN:  [id number]  Risk/Complexity Score:  Filed Complexity Screen Score: 3  Completed assessment with:  Pt, spouse, adult son, 8A IDT    Presenting Information   Reason for Referral:  Discharge plan  Date of Intake:  January 3, 2019  Referral Source:  Physician  Decision Maker:  pt  Alternate Decision Maker:  spouse  Living Situation:  House  Previous Functional Status:  Independent  Patient and family understanding of hospitalization:  Seeking TKA for right knee osteoarthritis  Cultural/Language/Spiritual Considerations:  ,   Adjustment to Illness:  Accepting of need for TCU    Physical Health  Reason for Admission:    1. Status post knee surgery      Services Needed/Recommended:  TCU    Mental Health/Chemical Dependency  Diagnosis:  None noted  Support/Services in Place:  NA  Services Needed/Recommended:  NA    Support System  Significant relationship at present time:  Spouse and adult children  Family of origin is available for support:  yes  Other support available:  Not assessed as family were present and involved  Gaps in support system:  None noted  Patient is caregiver to:  None     Provider Information   Primary Care Physician:  Madhu Miller   [phone]   Clinic:  [facility] [address]  / [facility]*      :      Financial   Income Source:  Pension, social security  Financial Concerns:  None noted  Insurance:    Payor/Plan Subscriber Name Rel Member # Group #   MEDICARE - MEDICARE [place]  [id number]       ATTN CLAIMS, [address]   BCBS - [place]  [id number]      [address] [address]       Discharge Plan   Patient and family discharge goal:  TCU  Provided education on discharge plan:  YES  Patient agreeable to discharge plan:  YES  A list of Medicare Certified Facilities was provided to the patient  and/or family to encourage patient choice. Patient's choices for facility are:  Writer reviewed facilities near pt and spouse. Offered to make referrals to facilities in [facility] or [place]. Pt's family wanted to review list  Will NH provide Skilled rehabilitation or complex medical:  YES  General information regarding anticipated insurance coverage and possible out of pocket cost was discussed. Patient and patient's family are aware patient may incur the cost of transportation to the facility, pending insurance payment: YES-private pay rooms  Barriers to discharge:  Medical stability    Discharge Recommendations   Anticipated Disposition:  Facility:  TCU-  Transportation Needs:  Other:  to be determined      Additional comments   Writer introduced role/reason for visit. Pt was receptive, but had OT session. Pt's family and writer discussed TCU facilities, they wanted to review Medicare.gov list before determining which facilities they would be willing to have TCU stay. Writer encouraged family to allow referrals to multiple facilities.     Pt has 2 steps to enter home. He did not have home care prior to surgery but had assistance if needed from his wife and adult children. SW con't to follow

## 2019-01-03 NOTE — CONSULTS
Consult Date:  01/02/2019      ASSESSMENT:   1.  Status post right total knee arthroplasty.  The patient is doing well postoperatively.   2.  History of mechanical aortic valve replacement in 2005, on chronic warfarin with a goal INR of 2 to 3.  Echocardiogram from 11/2018 revealed ejection fraction of 50% to 55% with a normal aortic valve replacement function.   3.  History of coronary artery disease, with fixed apical defect, but no evidence of ischemia.   4.  Hypertension, stable.   5.  Diabetes mellitus, well controlled on Victoza and b.i.d. 70/30 insulin.   6.  Elevated creatinine of 1.35 noted on 12/17/2018 and creatinine from what 01/2018 was 1.12.   7.  GERD, on PPI.   8.  Obstructive sleep apnea on CPAP.   9.  Chronic depression, on Celexa.   10.  History of impaired vision bilaterally secondary to glaucoma.      RECOMMENDATION:  Close attention will need to be paid to the patient's multiple medical issues including diabetes mellitus, hypertension, kidney disease and his cardiac issues including his need for full anticoagulation as soon as possible.  I would recommend:   1.  Resuming warfarin tonight with the institution of at least half dose enoxaparin in the morning with escalation to full therapeutic dose of enoxaparin as he is bridged with warfarin.   2.  Closely monitor blood glucoses.  I would discontinue 70/30 mix and start b.i.d. NPH insulin with prandial coverage by carb counting and sliding scale.  This can be transitioned back to his 70/30 b.i.d. regimen at the time of discharge.  Victoza will be continued.  He should be continued on metoprolol preferentially for blood pressure.  I would hold lisinopril for at least the short-term.  Electrolytes and renal function will be followed closely.  Celexa will be continued.  His daily use of clonazepam for what sounds like restless leg syndrome will be continued as he was taking this on a daily basis.  The patient will need to wear his CPAP at night and  when napping during the daytime.      I will follow this patient closely with you for these multiple medical issues.  My assessment and plans were discussed with the patient and his wife in detail today.      DISCUSSION:  Chase Zhao is a pleasant 73-year-old male with multiple chronic medical concerns who underwent a right total knee arthroplasty today by Dr. Cruz for the treatment of osteoarthritis of the knee.  I have been asked to see him by Dr. Cruz to assess multiple medical issues including history of mechanical aortic valve replacement, diabetes mellitus type 2, hypertension, ASCVD, PAD, sleep apnea.      The events of surgery are noted.  The patient received spinal anesthesia.  Estimated blood loss was less than 100 mL.  Vital signs were stable throughout the case.  Blood pressures were in the 130s to 150s over 60s to 80s throughout.  Heart rates were in the 70s.      Mr. Zhao was seen by me shortly after admission to the orthopedic unit.  His family is present.  He reports no discomfort in his right leg and overall feels well.  He denies chest pain, shortness of breath, cough, nausea, vomiting, abdominal pain, headache.      PAST MEDICAL HISTORY:  Complex and is remarkable for:   1.  Chronic pain secondary to osteoarthritis.  His family notes he is able to walk in his house, but in general has been limited severely by knee pain.   2.  History of mechanical aortic valve replacement in 2005, on chronic warfarin with unremarkable recent echocardiograms noted above.   3.  History of coronary artery disease, status post coronary artery bypass in 2005, with a negative stress test as noted above 1 month ago.   4.  Obstructive sleep apnea treated with CPAP.   5.  History of presbyesophagus.   6.  Peripheral arterial disease, status post bilateral lower extremity stenting.   7.  Diabetes mellitus type 2 with hemoglobin A1c in the mid 6 range per patient's wife.   8.  History of memory loss per chart.   9.   History of right lower extremity DVT with PE as above, on chronic anticoagulation.   10.  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   11.  Gastroesophageal reflux disease.      HABITS:  The patient quit smoking cigarettes in 1967.  Denies alcohol use.      PAST SURGICAL HISTORY:  Includes aortic valve and coronary artery bypass surgeries in 2005.  Lower extremity vascular surgeries including angioplasty and stenting of his left lower extremity in 2008, left total hip arthroplasty with subsequent revision laparoscopic cholecystectomy.      REVIEW OF SYSTEMS:  At baseline is remarkable for limitation of activity secondary to right leg pain.  As above, his family states he is able to walk in his house without significant difficulty.  He denies recent chest pain, shortness of breath, cough, fevers, chills, abdominal pain, nausea, vomiting.  A 12-point review of systems is negative except as noted above.      MEDICATIONS:  Reviewed with the patient's wife and include omeprazole 20 mg daily, fish oil 1200 mg daily, calcium with vitamin D 600 units daily, Victoza 1.8 mg subcu daily, Humalog mix 70/30 24 units in the morning and 26 units in the evening, lisinopril 5 mg twice daily, metoprolol tartrate 25 mg twice daily, Celexa 40 mg daily, baby Aspirin 81 mg daily, warfarin most recently 5 mg 6 days a week and 7.5 mg 1 day a week, Clonazepam 1 mg on a daily basis in the evening for restless legs, Crestor 40 mg daily.  The patient takes numerous skin creams for psoriasis on a p.r.n. basis.  He also receives Artificial tears and Restasis drops.      PHYSICAL EXAMINATION:   GENERAL:  He is a well-nourished male who is lying in bed.  He appears comfortable.   VITAL SIGNS:  Most recent blood pressure is 162/75, heart rate is in the 70s.  O2 saturations are in the upper 90s on oxygen.  He is not currently wearing a CPAP.   HEENT:  Face is symmetric.   NECK:  Supple.  I hear no carotid bruits.    LUNGS:  Clear.   CARDIAC:  Reveals a regular rate and rhythm without murmurs.   ABDOMEN:  Soft, nontender, nondistended.   EXTREMITIES:  Exam reveals both legs to be wrapped.  There is no edema.  The peripheral block place is in effect in the right leg.   NEUROLOGIC:  He is sleepy, does easily alert.  He is very hard of hearing.  He does seem fully oriented.  He was able to comment on most of his medications and medical issues.  Facies appear somewhat masked.  There is no tremor of his upper extremities.      LABORATORY DATA:  Preoperative labs are as described above.  Urinalysis was negative.  INR was 0.99 the day of surgery.  BMP is as described above.      As above, the patient was receiving enoxaparin 80 mg every 12 hours for bridging prior to surgery.  Last dose was on the morning of .      Total time spent to perform this consultation, which included review of records in Epic, discussion with the patient's family and Hematology was 1 hour.         ZAIN COOLEY MD             D: 2019   T: 2019   MT: CHELSEA      Name:     DENVER RICHARDS   MRN:      [id number]        Account:       [id number]   :      1945           Consult Date:  2019      Document: [id number]

## 2019-01-03 NOTE — PROGRESS NOTES
Pt was seen, case reviewed with team, wife    Pt was comfortable overnight    Pt states knee pain has been controlled  He denies chest pain, SOB, cough, abd pain  Hendricks was just discontinued  No BM yet    VSS  BP 120s/60s/  HR low 70s  BG mid 200s overnight    Pt is alert, fully oriented, in good spirits, sitting at bedside  RR 12, unlabored  Lungs clear  CV rrr  Abd soft, non-distended  No calf edema or tenderness R      Results for DENVER RICHARDS (MRN [id number]) as of 1/3/2019 13:32   Ref. Range 1/3/2019 05:51   Sodium Latest Ref Range: 133 - 144 mmol/L 139   Potassium Latest Ref Range: 3.4 - 5.3 mmol/L 4.2   Chloride Latest Ref Range: 94 - 109 mmol/L 106   Carbon Dioxide Latest Ref Range: 20 - 32 mmol/L 27   Urea Nitrogen Latest Ref Range: 7 - 30 mg/dL 21   Creatinine Latest Ref Range: 0.66 - 1.25 mg/dL 0.89   GFR Estimate Latest Ref Range: >60 mL/min/1.73_m2 83   GFR Estimate If Black Latest Ref Range: >60 mL/min/1.73_m2 >90   Calcium Latest Ref Range: 8.5 - 10.1 mg/dL 8.1 (L)   Anion Gap Latest Ref Range: 3 - 14 mmol/L 6   Magnesium Latest Ref Range: 1.6 - 2.3 mg/dL 1.8   Glucose Latest Ref Range: 70 - 99 mg/dL 216 (H)   Hemoglobin Latest Ref Range: 13.3 - 17.7 g/dL 13.5   INR Latest Ref Range: 0.86 - 1.14  1.10       Assessment    S/p R TKA.  Pain has been controlled.    S/p mechanical AVR, on warfarin chronically (goal INR 2-3).  Warfarin restarted last night. Pt to be started on enoxaparin this am. PTA ASA restarted today. Volume status appears adequate    DM type 2, on Victoza and bid 70/30 insulin.  BP elevated post op, possibly secondary to pre op Decadron.     HTN, controlled, on metoprolol alone thus far    Elevated Cr preop, nl post op    CECILIA, on CPAP    Chronic depression, on PTA Celexa    Chronic Clonazepam use for RLS, continued.    Plan  BID NPH increased (in place of bid 70/30 mix), continue Victoza, add sliding scale insulin + carb coverage (for now)  Continue Atenolol alone for  HTN/PAF  Start Lovenox 30 mg bid bridge with warfarin (discussed with Ortho)  Monitor bowel and bladder function closely

## 2019-01-03 NOTE — PROGRESS NOTES
" 01/03/19 1124   Quick Adds   Type of Visit Initial Occupational Therapy Evaluation   Living Environment   Lives With spouse   Living Arrangements house   Home Accessibility stairs to enter home   Living Environment Comment 2 stairs to enter; RTS (has both 2 and 4\"), grab bars in tub and by toilet   Self-Care   Usual Activity Tolerance moderate   Current Activity Tolerance fair   Equipment Currently Used at Home cane, straight;walker, rolling   Activity/Exercise/Self-Care Comment Patient ambulates with cane in house and walker for longer distances.  Patient has assist for ADLs at times due to vision, has assist for donning socks and shoes.     Functional Level   Ambulation 1-->assistive equipment   Transferring 1-->assistive equipment   Toileting 1-->assistive equipment   Bathing 1-->assistive equipment   Dressing 2-->assistive person   Eating 0-->independent   Communication 0-->understands/communicates without difficulty   Swallowing 0-->swallows foods/liquids without difficulty   Cognition 0 - no cognition issues reported   Fall history within last six months yes   Number of times patient has fallen within last six months 4   Prior Functional Level Comment Patient ambulates with cane in house and walker for longer distances.  Patient has assist for ADLs at times due to vision, has assist for donning socks and shoes.     General Information   Onset of Illness/Injury or Date of Surgery - Date 01/02/19   Referring Physician Dr. Cruz   Patient/Family Goals Statement return home to baseline   Additional Occupational Profile Info/Pertinent History of Current Problem POD #1 s/p R TKA   Weight-Bearing Status - RLE weight-bearing as tolerated   Visual Perception   Visual Perception Comments L eye blindness with limited eye sight in R eye   Pain Assessment   Patient Currently in Pain Yes, see Vital Sign flowsheet   Mobility   Bed Mobility Bed mobility skill: Supine to sit   Bed Mobility Skill: Supine to Sit   Level of " "Catron: Supine/Sit stand-by assist   Transfer Skill: Bed to Chair/Chair to Bed   Level of Catron: Bed to Chair contact guard   Assistive Device - Transfer Skill Bed to Chair Chair to Bed Rehab Eval standard walker   Transfer Skill: Sit to Stand   Level of Catron: Sit/Stand contact guard   Assistive Device for Transfer: Sit/Stand standard walker   Transfer Skill: Toilet Transfer   Level of Catron: Toilet contact guard   Assistive Device grab bars   Bathing   Level of Catron other (see comments)  (not assessed)   Upper Body Dressing   Level of Catron: Dress Upper Body independent   Lower Body Dressing   Level of Catron: Dress Lower Body stand-by assist   Toileting   Level of Catron: Toilet stand-by assist   Grooming   Level of Catron: Grooming stand-by assist   Eating/Self Feeding   Level of Catron: Eating independent   Activities of Daily Living Analysis   Impairments Contributing to Impaired Activities of Daily Living pain;post surgical precautions;ROM decreased   General Therapy Interventions   Planned Therapy Interventions ADL retraining   Clinical Impression   Criteria for Skilled Therapeutic Interventions Met yes, treatment indicated   OT Diagnosis impaired self-cares/mobility   Influenced by the following impairments pain; decreased ROM   Assessment of Occupational Performance 1-3 Performance Deficits   Identified Performance Deficits dressing, bathing, toileting   Clinical Decision Making (Complexity) Low complexity   Therapy Frequency daily   Predicted Duration of Therapy Intervention (days/wks) 2-3 days   Anticipated Discharge Disposition Home with Assist;Transitional Care Facility   Risks and Benefits of Treatment have been explained. Yes   Patient, Family & other staff in agreement with plan of care Yes   [facility] AM-PAC TM \"6 Clicks\"   2016, Trustees of [facility], under license to [employer].  All rights reserved.   6 Clicks " "Short Forms Daily Activity Inpatient Short Form   [facility] AM-PAC  \"6 Clicks\" Daily Activity Inpatient Short Form   1. Putting on and taking off regular lower body clothing? 3 - A Little   2. Bathing (including washing, rinsing, drying)? 3 - A Little   3. Toileting, which includes using toilet, bedpan or urinal? 3 - A Little   4. Putting on and taking off regular upper body clothing? 4 - None   5. Taking care of personal grooming such as brushing teeth? 4 - None   6. Eating meals? 4 - None   Daily Activity Raw Score (Score out of 24.Lower scores equate to lower levels of function) 21   Total Evaluation Time   Total Evaluation Time (Minutes) 8     "

## 2019-01-03 NOTE — PLAN OF CARE
VS: Temp: 96.5  F (35.8  C) Temp src: Oral BP: 121/64 Pulse: 75 Heart Rate: 71 Resp: 24   O2:  SpO2: 100 % O2 Device: None (Room air)    Output: Urine output adequate   Last BM: 1/3   Activity: Up with Ax1 and walker   Skin: Incision, blanchable redness on buttocks d/t psoriasis    Pain: Minimal pain, declines any pain medication. Scheduled Tylenol given   CMS: Intact - denies numbness or tingling   Dressing: CDI   Diet: Regular diet, well tolerated. No N/V   LDA: PIV, infusing   Equipment: PCDs, capno   Plan: Continue to monitor and manage pain   Additional Info: Wife is at the bedside, pt is hard of hearing and has poor vision

## 2019-01-03 NOTE — PROGRESS NOTES
01/03/19 0958   Quick Adds   Type of Visit Initial PT Evaluation       Present no   Language English   Living Environment   Lives With spouse   Living Arrangements house   Home Accessibility stairs to enter home   Living Environment Comment Patient has 2 stairs to enter with left rail. Does not have to complete stairs within and can remain on one level   Self-Care   Usual Activity Tolerance moderate   Current Activity Tolerance fair   Equipment Currently Used at Home cane, straight;walker, rolling  (walker for long distances)   Functional Level Prior   Ambulation 1-->assistive equipment   Transferring 1-->assistive equipment   Toileting 0-->independent   Bathing 1-->assistive equipment   Communication 0-->understands/communicates without difficulty   Swallowing 0-->swallows foods/liquids without difficulty   Cognition 0 - no cognition issues reported   Fall history within last six months yes   Number of times patient has fallen within last six months (>4)   Which of the above functional risks had a recent onset or change? ambulation;transferring   General Information   Onset of Illness/Injury or Date of Surgery - Date 01/02/19   Referring Physician Neto Stephen MD   Patient/Family Goals Statement Did not endorse   Pertinent History of Current Problem (include personal factors and/or comorbidities that impact the POC) s/p R TKA   Precautions/Limitations fall precautions   Weight-Bearing Status - LUE full weight-bearing   Weight-Bearing Status - RUE full weight-bearing   Weight-Bearing Status - LLE full weight-bearing   Weight-Bearing Status - RLE weight-bearing as tolerated   General Observations Supine in bed upon arrival, pleasant and agreeable   General Info Comments IV, capnol   Cognitive Status Examination   Orientation orientation to person, place and time   Level of Consciousness alert   Follows Commands and Answers Questions 100% of the time;able to follow multistep  instructions   Personal Safety and Judgment intact   Memory intact   Pain Assessment   Patient Currently in Pain Yes, see Vital Sign flowsheet   Integumentary/Edema   Integumentary/Edema Comments Patient with normal post-surgical swelling present   Posture    Posture Forward head position;Kyphosis;Protracted shoulders   Posture Comments Mild to moderate sitting EOB and standing   Range of Motion (ROM)   ROM Comment R knee AAROM=0-5-85   Strength   Strength Comments Did not formally assess, demonstrates good strength to R LE with supine exercises   Bed Mobility   Bed Mobility Comments Supine<>sit transfer with CGA and R LE only   Transfer Skills   Transfer Comments Completes sit<>stand transfer with CGA and use of walker   Gait   Gait Comments Tolerated ambulating in lama with CGA and use of walker, mild instability noted   Balance   Balance Comments Independent sitting balance, CGA for standing balance with UE support from walker   Sensory Examination   Sensory Perception Comments baseline neuropathy   General Therapy Interventions   Planned Therapy Interventions balance training;bed mobility training;gait training;ROM;strengthening;transfer training;risk factor education;home program guidelines;progressive activity/exercise   Clinical Impression   Criteria for Skilled Therapeutic Intervention yes, treatment indicated   PT Diagnosis impaired functional mobility   Influenced by the following impairments increased post-op pain, decreased R LE strengtrh and ROM, decreased activity tolerance, impaired balance   Functional limitations due to impairments impaired bed mobility, transfers and ambulation   Clinical Presentation Stable/Uncomplicated   Clinical Presentation Rationale s/p R TKA, mobilizing well   Clinical Decision Making (Complexity) Low complexity   Therapy Frequency` 2 times/day   Predicted Duration of Therapy Intervention (days/wks) 4 days   Anticipated Equipment Needs at Discharge (has cane and walker)  "  Anticipated Discharge Disposition Transitional Care Facility   Risk & Benefits of therapy have been explained Yes   Patient, Family & other staff in agreement with plan of care Yes   [facility]-PAC TM \"6 Clicks\"   2016, Trustees of [facility], under license to [employer].  All rights reserved.   6 Clicks Short Forms Basic Mobility Inpatient Short Form   [facility] AM-PAC  \"6 Clicks\" V.2 Basic Mobility Inpatient Short Form   1. Turning from your back to your side while in a flat bed without using bedrails? 4 - None   2. Moving from lying on your back to sitting on the side of a flat bed without using bedrails? 4 - None   3. Moving to and from a bed to a chair (including a wheelchair)? 3 - A Little   4. Standing up from a chair using your arms (e.g., wheelchair, or bedside chair)? 3 - A Little   5. To walk in hospital room? 3 - A Little   6. Climbing 3-5 steps with a railing? 2 - A Lot   Basic Mobility Raw Score (Score out of 24.Lower scores equate to lower levels of function) 19   Total Evaluation Time   Total Evaluation Time (Minutes) 10     "

## 2019-01-04 ENCOUNTER — APPOINTMENT (OUTPATIENT)
Dept: CT IMAGING | Facility: CLINIC | Age: 74
DRG: 470 | End: 2019-01-04
Attending: ORTHOPAEDIC SURGERY
Payer: MEDICARE

## 2019-01-04 ENCOUNTER — APPOINTMENT (OUTPATIENT)
Dept: OCCUPATIONAL THERAPY | Facility: CLINIC | Age: 74
DRG: 470 | End: 2019-01-04
Attending: ORTHOPAEDIC SURGERY
Payer: MEDICARE

## 2019-01-04 PROBLEM — R29.898 WEAKNESS OF EXTREMITY: Status: ACTIVE | Noted: 2019-01-04

## 2019-01-04 PROBLEM — G45.9 TIA (TRANSIENT ISCHEMIC ATTACK): Status: ACTIVE | Noted: 2019-01-04

## 2019-01-04 LAB
ALBUMIN SERPL-MCNC: 3 G/DL (ref 3.4–5)
ALP SERPL-CCNC: 69 U/L (ref 40–150)
ALT SERPL W P-5'-P-CCNC: 22 U/L (ref 0–70)
ANION GAP SERPL CALCULATED.3IONS-SCNC: 4 MMOL/L (ref 3–14)
ANION GAP SERPL CALCULATED.3IONS-SCNC: 7 MMOL/L (ref 3–14)
APTT PPP: 30 SEC (ref 22–37)
AST SERPL W P-5'-P-CCNC: 41 U/L (ref 0–45)
BILIRUB SERPL-MCNC: 0.7 MG/DL (ref 0.2–1.3)
BUN SERPL-MCNC: 16 MG/DL (ref 7–30)
BUN SERPL-MCNC: 17 MG/DL (ref 7–30)
CALCIUM SERPL-MCNC: 8.5 MG/DL (ref 8.5–10.1)
CALCIUM SERPL-MCNC: 8.7 MG/DL (ref 8.5–10.1)
CHLORIDE SERPL-SCNC: 106 MMOL/L (ref 94–109)
CHLORIDE SERPL-SCNC: 107 MMOL/L (ref 94–109)
CO2 SERPL-SCNC: 24 MMOL/L (ref 20–32)
CO2 SERPL-SCNC: 27 MMOL/L (ref 20–32)
CREAT SERPL-MCNC: 0.8 MG/DL (ref 0.66–1.25)
CREAT SERPL-MCNC: 0.84 MG/DL (ref 0.66–1.25)
ERYTHROCYTE [DISTWIDTH] IN BLOOD BY AUTOMATED COUNT: 15.1 % (ref 10–15)
ERYTHROCYTE [DISTWIDTH] IN BLOOD BY AUTOMATED COUNT: 15.5 % (ref 10–15)
FIBRINOGEN PPP-MCNC: 544 MG/DL (ref 200–420)
GFR SERPL CREATININE-BSD FRML MDRD: 85 ML/MIN/{1.73_M2}
GFR SERPL CREATININE-BSD FRML MDRD: 87 ML/MIN/{1.73_M2}
GLUCOSE BLDC GLUCOMTR-MCNC: 107 MG/DL (ref 70–99)
GLUCOSE BLDC GLUCOMTR-MCNC: 177 MG/DL (ref 70–99)
GLUCOSE BLDC GLUCOMTR-MCNC: 180 MG/DL (ref 70–99)
GLUCOSE BLDC GLUCOMTR-MCNC: 185 MG/DL (ref 70–99)
GLUCOSE BLDC GLUCOMTR-MCNC: 185 MG/DL (ref 70–99)
GLUCOSE BLDC GLUCOMTR-MCNC: 264 MG/DL (ref 70–99)
GLUCOSE BLDC GLUCOMTR-MCNC: 278 MG/DL (ref 70–99)
GLUCOSE SERPL-MCNC: 192 MG/DL (ref 70–99)
GLUCOSE SERPL-MCNC: 213 MG/DL (ref 70–99)
HBA1C MFR BLD: 6.7 % (ref 0–5.6)
HCT VFR BLD AUTO: 43.1 % (ref 40–53)
HCT VFR BLD AUTO: 43.6 % (ref 40–53)
HGB BLD-MCNC: 13.4 G/DL (ref 13.3–17.7)
HGB BLD-MCNC: 13.6 G/DL (ref 13.3–17.7)
HGB BLD-MCNC: 14.4 G/DL (ref 13.3–17.7)
INR PPP: 1.15 (ref 0.86–1.14)
INR PPP: 1.18 (ref 0.86–1.14)
INTERPRETATION ECG - MUSE: NORMAL
LMWH PPP CHRO-ACNC: 0.21 IU/ML
MAGNESIUM SERPL-MCNC: 1.8 MG/DL (ref 1.6–2.3)
MCH RBC QN AUTO: 29.8 PG (ref 26.5–33)
MCH RBC QN AUTO: 30.2 PG (ref 26.5–33)
MCHC RBC AUTO-ENTMCNC: 31.6 G/DL (ref 31.5–36.5)
MCHC RBC AUTO-ENTMCNC: 33 G/DL (ref 31.5–36.5)
MCV RBC AUTO: 91 FL (ref 78–100)
MCV RBC AUTO: 95 FL (ref 78–100)
MRSA DNA SPEC QL NAA+PROBE: NEGATIVE
PLATELET # BLD AUTO: 128 10E9/L (ref 150–450)
PLATELET # BLD AUTO: 134 10E9/L (ref 150–450)
POTASSIUM SERPL-SCNC: 4.1 MMOL/L (ref 3.4–5.3)
POTASSIUM SERPL-SCNC: 4.2 MMOL/L (ref 3.4–5.3)
PROT SERPL-MCNC: 6.7 G/DL (ref 6.8–8.8)
RBC # BLD AUTO: 4.56 10E12/L (ref 4.4–5.9)
RBC # BLD AUTO: 4.77 10E12/L (ref 4.4–5.9)
SODIUM SERPL-SCNC: 137 MMOL/L (ref 133–144)
SODIUM SERPL-SCNC: 138 MMOL/L (ref 133–144)
SPECIMEN SOURCE: NORMAL
TROPONIN I SERPL-MCNC: <0.015 UG/L (ref 0–0.04)
WBC # BLD AUTO: 7.5 10E9/L (ref 4–11)
WBC # BLD AUTO: 8.2 10E9/L (ref 4–11)

## 2019-01-04 PROCEDURE — 25000125 ZZHC RX 250: Performed by: ORTHOPAEDIC SURGERY

## 2019-01-04 PROCEDURE — 85610 PROTHROMBIN TIME: CPT | Performed by: INTERNAL MEDICINE

## 2019-01-04 PROCEDURE — 25000132 ZZH RX MED GY IP 250 OP 250 PS 637: Mod: GY | Performed by: PSYCHIATRY & NEUROLOGY

## 2019-01-04 PROCEDURE — 25000128 H RX IP 250 OP 636: Performed by: STUDENT IN AN ORGANIZED HEALTH CARE EDUCATION/TRAINING PROGRAM

## 2019-01-04 PROCEDURE — 25000132 ZZH RX MED GY IP 250 OP 250 PS 637: Mod: GY | Performed by: STUDENT IN AN ORGANIZED HEALTH CARE EDUCATION/TRAINING PROGRAM

## 2019-01-04 PROCEDURE — 80048 BASIC METABOLIC PNL TOTAL CA: CPT | Performed by: STUDENT IN AN ORGANIZED HEALTH CARE EDUCATION/TRAINING PROGRAM

## 2019-01-04 PROCEDURE — 85520 HEPARIN ASSAY: CPT | Performed by: STUDENT IN AN ORGANIZED HEALTH CARE EDUCATION/TRAINING PROGRAM

## 2019-01-04 PROCEDURE — 87641 MR-STAPH DNA AMP PROBE: CPT | Performed by: PSYCHIATRY & NEUROLOGY

## 2019-01-04 PROCEDURE — A9270 NON-COVERED ITEM OR SERVICE: HCPCS | Mod: GY | Performed by: PSYCHIATRY & NEUROLOGY

## 2019-01-04 PROCEDURE — 25000132 ZZH RX MED GY IP 250 OP 250 PS 637: Mod: GY | Performed by: INTERNAL MEDICINE

## 2019-01-04 PROCEDURE — 40000133 ZZH STATISTIC OT WARD VISIT: Performed by: OCCUPATIONAL THERAPIST

## 2019-01-04 PROCEDURE — 70498 CT ANGIOGRAPHY NECK: CPT

## 2019-01-04 PROCEDURE — 85384 FIBRINOGEN ACTIVITY: CPT | Performed by: INTERNAL MEDICINE

## 2019-01-04 PROCEDURE — 87640 STAPH A DNA AMP PROBE: CPT | Performed by: PSYCHIATRY & NEUROLOGY

## 2019-01-04 PROCEDURE — 25000128 H RX IP 250 OP 636: Performed by: INTERNAL MEDICINE

## 2019-01-04 PROCEDURE — 40000268 ZZH STATISTIC NO CHARGES

## 2019-01-04 PROCEDURE — 12000001 ZZH R&B MED SURG/OB UMMC

## 2019-01-04 PROCEDURE — 36415 COLL VENOUS BLD VENIPUNCTURE: CPT | Performed by: STUDENT IN AN ORGANIZED HEALTH CARE EDUCATION/TRAINING PROGRAM

## 2019-01-04 PROCEDURE — 84484 ASSAY OF TROPONIN QUANT: CPT | Performed by: INTERNAL MEDICINE

## 2019-01-04 PROCEDURE — 83036 HEMOGLOBIN GLYCOSYLATED A1C: CPT | Performed by: STUDENT IN AN ORGANIZED HEALTH CARE EDUCATION/TRAINING PROGRAM

## 2019-01-04 PROCEDURE — 99233 SBSQ HOSP IP/OBS HIGH 50: CPT | Performed by: INTERNAL MEDICINE

## 2019-01-04 PROCEDURE — 40000275 ZZH STATISTIC RCP TIME EA 10 MIN

## 2019-01-04 PROCEDURE — 83735 ASSAY OF MAGNESIUM: CPT | Performed by: INTERNAL MEDICINE

## 2019-01-04 PROCEDURE — 85610 PROTHROMBIN TIME: CPT | Performed by: STUDENT IN AN ORGANIZED HEALTH CARE EDUCATION/TRAINING PROGRAM

## 2019-01-04 PROCEDURE — 00000146 ZZHCL STATISTIC GLUCOSE BY METER IP

## 2019-01-04 PROCEDURE — A9270 NON-COVERED ITEM OR SERVICE: HCPCS | Mod: GY | Performed by: INTERNAL MEDICINE

## 2019-01-04 PROCEDURE — 85027 COMPLETE CBC AUTOMATED: CPT | Performed by: STUDENT IN AN ORGANIZED HEALTH CARE EDUCATION/TRAINING PROGRAM

## 2019-01-04 PROCEDURE — 85018 HEMOGLOBIN: CPT | Performed by: STUDENT IN AN ORGANIZED HEALTH CARE EDUCATION/TRAINING PROGRAM

## 2019-01-04 PROCEDURE — 40000895 ZZH STATISTIC SLP IP EVAL DEFER

## 2019-01-04 PROCEDURE — 25000128 H RX IP 250 OP 636: Performed by: ORTHOPAEDIC SURGERY

## 2019-01-04 PROCEDURE — 97535 SELF CARE MNGMENT TRAINING: CPT | Mod: GO | Performed by: OCCUPATIONAL THERAPIST

## 2019-01-04 PROCEDURE — 96365 THER/PROPH/DIAG IV INF INIT: CPT

## 2019-01-04 PROCEDURE — 80053 COMPREHEN METABOLIC PANEL: CPT | Performed by: INTERNAL MEDICINE

## 2019-01-04 PROCEDURE — 85027 COMPLETE CBC AUTOMATED: CPT | Performed by: INTERNAL MEDICINE

## 2019-01-04 PROCEDURE — 36415 COLL VENOUS BLD VENIPUNCTURE: CPT | Performed by: INTERNAL MEDICINE

## 2019-01-04 PROCEDURE — 85730 THROMBOPLASTIN TIME PARTIAL: CPT | Performed by: INTERNAL MEDICINE

## 2019-01-04 PROCEDURE — A9270 NON-COVERED ITEM OR SERVICE: HCPCS | Mod: GY | Performed by: STUDENT IN AN ORGANIZED HEALTH CARE EDUCATION/TRAINING PROGRAM

## 2019-01-04 RX ORDER — AMOXICILLIN 250 MG
2 CAPSULE ORAL 2 TIMES DAILY PRN
Status: DISCONTINUED | OUTPATIENT
Start: 2019-01-04 | End: 2019-01-07 | Stop reason: HOSPADM

## 2019-01-04 RX ORDER — CLONAZEPAM 1 MG/1
1 TABLET ORAL AT BEDTIME
Status: DISCONTINUED | OUTPATIENT
Start: 2019-01-04 | End: 2019-01-07 | Stop reason: HOSPADM

## 2019-01-04 RX ORDER — CITALOPRAM HYDROBROMIDE 40 MG/1
40 TABLET ORAL DAILY
Status: DISCONTINUED | OUTPATIENT
Start: 2019-01-05 | End: 2019-01-07 | Stop reason: HOSPADM

## 2019-01-04 RX ORDER — ACETAMINOPHEN 325 MG/1
650 TABLET ORAL EVERY 4 HOURS PRN
Status: DISCONTINUED | OUTPATIENT
Start: 2019-01-05 | End: 2019-01-07 | Stop reason: HOSPADM

## 2019-01-04 RX ORDER — AMOXICILLIN 250 MG
1 CAPSULE ORAL 2 TIMES DAILY PRN
Status: DISCONTINUED | OUTPATIENT
Start: 2019-01-04 | End: 2019-01-07 | Stop reason: HOSPADM

## 2019-01-04 RX ORDER — SODIUM CHLORIDE 9 MG/ML
INJECTION, SOLUTION INTRAVENOUS CONTINUOUS
Status: DISCONTINUED | OUTPATIENT
Start: 2019-01-04 | End: 2019-01-04

## 2019-01-04 RX ORDER — CALCIUM CARBONATE 500 MG/1
1000 TABLET, CHEWABLE ORAL 4 TIMES DAILY PRN
Status: DISCONTINUED | OUTPATIENT
Start: 2019-01-04 | End: 2019-01-07 | Stop reason: HOSPADM

## 2019-01-04 RX ORDER — BISACODYL 10 MG
10 SUPPOSITORY, RECTAL RECTAL DAILY PRN
Status: DISCONTINUED | OUTPATIENT
Start: 2019-01-04 | End: 2019-01-07 | Stop reason: HOSPADM

## 2019-01-04 RX ORDER — LABETALOL HYDROCHLORIDE 5 MG/ML
10-20 INJECTION, SOLUTION INTRAVENOUS EVERY 10 MIN PRN
Status: DISCONTINUED | OUTPATIENT
Start: 2019-01-04 | End: 2019-01-07 | Stop reason: HOSPADM

## 2019-01-04 RX ORDER — LIRAGLUTIDE 6 MG/ML
1.8 INJECTION SUBCUTANEOUS
Status: DISCONTINUED | OUTPATIENT
Start: 2019-01-04 | End: 2019-01-04

## 2019-01-04 RX ORDER — METOPROLOL TARTRATE 25 MG/1
25 TABLET, FILM COATED ORAL 2 TIMES DAILY
Status: DISCONTINUED | OUTPATIENT
Start: 2019-01-04 | End: 2019-01-07 | Stop reason: HOSPADM

## 2019-01-04 RX ORDER — WARFARIN SODIUM 7.5 MG/1
7.5 TABLET ORAL
Status: COMPLETED | OUTPATIENT
Start: 2019-01-04 | End: 2019-01-04

## 2019-01-04 RX ORDER — ROSUVASTATIN CALCIUM 20 MG/1
40 TABLET, COATED ORAL AT BEDTIME
Status: DISCONTINUED | OUTPATIENT
Start: 2019-01-04 | End: 2019-01-07 | Stop reason: HOSPADM

## 2019-01-04 RX ORDER — IOPAMIDOL 755 MG/ML
100 INJECTION, SOLUTION INTRAVASCULAR ONCE
Status: COMPLETED | OUTPATIENT
Start: 2019-01-04 | End: 2019-01-04

## 2019-01-04 RX ORDER — AMOXICILLIN 250 MG
2 CAPSULE ORAL 2 TIMES DAILY PRN
Status: DISCONTINUED | OUTPATIENT
Start: 2019-01-04 | End: 2019-01-04

## 2019-01-04 RX ORDER — ACETAMINOPHEN 325 MG/1
650 TABLET ORAL EVERY 4 HOURS PRN
Qty: 30 TABLET | Refills: 0 | Status: SHIPPED | OUTPATIENT
Start: 2019-01-05 | End: 2019-01-07

## 2019-01-04 RX ORDER — ACETAMINOPHEN 325 MG/1
975 TABLET ORAL EVERY 8 HOURS
Status: COMPLETED | OUTPATIENT
Start: 2019-01-04 | End: 2019-01-05

## 2019-01-04 RX ORDER — AMOXICILLIN 250 MG
1 CAPSULE ORAL 2 TIMES DAILY PRN
Status: DISCONTINUED | OUTPATIENT
Start: 2019-01-04 | End: 2019-01-04

## 2019-01-04 RX ORDER — HYDRALAZINE HYDROCHLORIDE 20 MG/ML
10-20 INJECTION INTRAMUSCULAR; INTRAVENOUS
Status: DISCONTINUED | OUTPATIENT
Start: 2019-01-04 | End: 2019-01-07 | Stop reason: HOSPADM

## 2019-01-04 RX ORDER — HEPARIN SODIUM 10000 [USP'U]/100ML
0-3500 INJECTION, SOLUTION INTRAVENOUS CONTINUOUS
Status: DISCONTINUED | OUTPATIENT
Start: 2019-01-04 | End: 2019-01-04

## 2019-01-04 RX ORDER — AMOXICILLIN 250 MG
1 CAPSULE ORAL 2 TIMES DAILY
Status: DISCONTINUED | OUTPATIENT
Start: 2019-01-04 | End: 2019-01-07 | Stop reason: HOSPADM

## 2019-01-04 RX ORDER — AMOXICILLIN 250 MG
2 CAPSULE ORAL 2 TIMES DAILY
Status: DISCONTINUED | OUTPATIENT
Start: 2019-01-04 | End: 2019-01-07 | Stop reason: HOSPADM

## 2019-01-04 RX ORDER — HEPARIN SODIUM 10000 [USP'U]/100ML
0-3500 INJECTION, SOLUTION INTRAVENOUS CONTINUOUS
Status: DISCONTINUED | OUTPATIENT
Start: 2019-01-04 | End: 2019-01-05

## 2019-01-04 RX ORDER — POLYETHYLENE GLYCOL 3350 17 G/17G
17 POWDER, FOR SOLUTION ORAL DAILY PRN
Status: DISCONTINUED | OUTPATIENT
Start: 2019-01-04 | End: 2019-01-04

## 2019-01-04 RX ORDER — ASPIRIN 81 MG/1
81 TABLET ORAL
Status: DISCONTINUED | OUTPATIENT
Start: 2019-01-04 | End: 2019-01-04

## 2019-01-04 RX ORDER — POLYETHYLENE GLYCOL 3350 17 G/17G
17 POWDER, FOR SOLUTION ORAL DAILY PRN
Status: DISCONTINUED | OUTPATIENT
Start: 2019-01-04 | End: 2019-01-07 | Stop reason: HOSPADM

## 2019-01-04 RX ADMIN — Medication 2.5 MG: at 08:04

## 2019-01-04 RX ADMIN — ROSUVASTATIN CALCIUM 40 MG: 20 TABLET, FILM COATED ORAL at 21:57

## 2019-01-04 RX ADMIN — SENNOSIDES AND DOCUSATE SODIUM 1 TABLET: 8.6; 5 TABLET ORAL at 19:31

## 2019-01-04 RX ADMIN — Medication 5 MG: at 16:57

## 2019-01-04 RX ADMIN — SODIUM CHLORIDE: 9 INJECTION, SOLUTION INTRAVENOUS at 12:56

## 2019-01-04 RX ADMIN — Medication 2.5 MG: at 04:50

## 2019-01-04 RX ADMIN — SODIUM CHLORIDE 80 ML: 9 INJECTION, SOLUTION INTRAVENOUS at 09:35

## 2019-01-04 RX ADMIN — Medication 5 MG: at 21:57

## 2019-01-04 RX ADMIN — OMEPRAZOLE 20 MG: 20 CAPSULE, DELAYED RELEASE ORAL at 15:38

## 2019-01-04 RX ADMIN — INSULIN ASPART 1 UNITS: 100 INJECTION, SOLUTION INTRAVENOUS; SUBCUTANEOUS at 12:07

## 2019-01-04 RX ADMIN — WARFARIN SODIUM 7.5 MG: 7.5 TABLET ORAL at 17:01

## 2019-01-04 RX ADMIN — INSULIN ASPART 3 UNITS: 100 INJECTION, SOLUTION INTRAVENOUS; SUBCUTANEOUS at 18:31

## 2019-01-04 RX ADMIN — METOPROLOL TARTRATE 25 MG: 25 TABLET, FILM COATED ORAL at 19:31

## 2019-01-04 RX ADMIN — HEPARIN SODIUM 1200 UNITS/HR: 10000 INJECTION, SOLUTION INTRAVENOUS at 10:05

## 2019-01-04 RX ADMIN — IOPAMIDOL 75 ML: 755 INJECTION, SOLUTION INTRAVENOUS at 09:30

## 2019-01-04 RX ADMIN — ACETAMINOPHEN 975 MG: 325 TABLET, FILM COATED ORAL at 17:01

## 2019-01-04 RX ADMIN — Medication 2.5 MG: at 00:36

## 2019-01-04 RX ADMIN — CLONAZEPAM 1 MG: 1 TABLET ORAL at 21:57

## 2019-01-04 RX ADMIN — Medication 5 MG: at 12:35

## 2019-01-04 RX ADMIN — ACETAMINOPHEN 975 MG: 325 TABLET, FILM COATED ORAL at 00:32

## 2019-01-04 ASSESSMENT — ACTIVITIES OF DAILY LIVING (ADL)
ADLS_ACUITY_SCORE: 19

## 2019-01-04 ASSESSMENT — MIFFLIN-ST. JEOR: SCORE: 1718.56

## 2019-01-04 NOTE — H&P
[facility], [place]      Neurology Stroke Admission    Patient Name: Chase Zhao  : 1945 MRN#: [id number]    STROKE DATA    Stroke Code:  Stroke code called at [facility] at 08:52.  Time patient seen:  2019 11:40 - after transfer to [place].  Last known normal (pt's baseline):  2019 08:30 per patient.     TPA treatment:  Not given due to minor / isolated / quickly resolving stroke symptoms.      Stroke Scales  National Institutes of Health Stroke Scale  Exam Interval: time patient seen   Score    Level of consciousness: (0)   Alert, keenly responsive    LOC questions: (0)   Answers both questions correctly    LOC commands: (0)   Performs both tasks correctly    Best gaze: (0)   Normal    Visual: (0)   No visual loss    Facial palsy: (0)   Normal symmetrical movements *mild right ptosis, chronic    Motor arm (left): (0)   No drift    Motor arm (right): (0)   No drift    Motor leg (left): (0)   No drift    Motor leg (right): (0)   No drift    Limb ataxia: (0)   Absent    Sensory: (0)   Normal- no sensory loss    Best language: (0)   Normal- no aphasia    Dysarthria: (0)   Normal    Extinction and inattention: (0)   No abnormality        Total Score:  0       Dysphagia Screen  Time of screenin2019 approx 12:00 at bedside.  Screening results: Passed screening, no dysarthria - Regular Diet with thin liquids     ASSESSMENT & PLAN BY PROBLEM   This is a 73 year old male with history of mechanical AVR (on chronic warfarin), CAD, HTN, DM, CECILIA who presents s/p right TKA on 19 with left arm numbness and weakness. He is on warfarin and aspirin 81 at home, which were being held in the setting of surgery. Symptoms resolved within 30 mins. He describes similar episodes once a month for a few years. CTH/CTA head and neck revealed no LVO or acute hemorrhage. Pt was transferred for further work up of possible minor stroke vs TIA.    Transient Ischemic Attack vs.  Minor Right hemispheric stroke  - ABCD2 Score: 5   - Admit to Neurology  - Neurochecks  - Euthermia, Euglycemia  - Hold PTA aspirin, currently being bridged to home Warfarin (see below)  - PTA Rosuvastatin 40 mg  - MRI/MRA Stroke Protocol  - TTE (11/12/18): EF 50% to 55%, normal AVR function, no thrombus, abnormal septal motion consistent with conduction delay  - 24-hour Telemetry  - Bedside Glucose Monitoring  - A1c 6.7  - Lipid Panel ordered  - PT/OT/SLP  - Stroke Education  - Depression Screen  - Apnea Screen: already has CECILIA    During initial physical assessment, the plan of care was discussed and developed with patient and family.  Plan of care includes: heparin drip and neurochecks .    Patient was admitted via transfer from [facility] ED ([facility]).    The patient will be admitted to the Neuro Critical Care/Stroke team..     Other Medical Problems:  #s/p Rt TKA (1/2/19):  - Ortho consult  - Pain consult  - scheduled tylenol  - Dilaudid 0.2 mg q2h prn  - Oxycodone 2.5-5 mg q3h prn    #CAD w/ fixed apical defect   #Hx of mechanical AVR (2005)  - on chronic Warfarin, goal 2-3  - Echo 11/2018 revealed ejection fraction of 50% to 55% with a normal aortic valve replacement function.   - Held for Rt TKA 1/2/19 --> will resume today  - Started Hep gtt to bridge back to home warfarin  - Hold PTA Aspirin    #Depression  - PTA Celexa    #RLS  - PTA Clonazepam    #Paroxysmal Afib  - PTA Metoprolol 25 BID  - on chronic Warfarin; held for right TKA 1/2   - Started Hep gtt to bridge back to home warfarin    #HTN  - PTA Metoprolol 25 BID  - Hydralazine PRN  - Labetalol PRN    #Type 2 DM  - Hold PTA Victoza  - sliding scale insulin medium dose    #CECILIA: on CPAP    #GERD  - PTA Omeprazole    #Hx of glaucoma    #Bowel Regimen  - Senna BID   - Miralax PRN  - Dulcolax PRN    Fluids/Electrolytes/Nutrition: PO, Regular diet  DVT Prophylaxis: Heparin gtt, bridging back to home warfarin  GI Prophylaxis: PPI  Code Status: Full  Code    HPI  Chase Zhao is a 73 year old male with history of mechanical AVR (on chronic warfarin), CAD, HTN, DM, CECILIA who presents as transfer from [facility] s/p right TKA on 1/2/19 with left arm numbness and weakness. Pt reports he woke up this AM feeling well. Last known normal was 8:30AM. He describes he was unable to  with left hand when asked by the medical team at prior hospital. He is on chronic warfarin, which has been held for his TKA. CTH/CTA head and neck revealed no LVO or acute hemorrhage. Pt was transferred for further work up of possible minor stroke vs TIA.    Symptoms fully resolved within approximately 30 mins with no residual deficits when he arrived to [place]. Denies other focal weakness, dizziness, numbness/tingling elsewhere, trouble with speech.    Pt reports multiple episodes like this in the past which resolve within 30 mins. They occur about once/month for the last few years. He went to [facility] a couple of times but reports he was advised he did not have to go to the hospital each time as he was already on maximal treatment (on warfarin and aspirin 81 at home).    Pertinent Past Medical/Surgical History  Past Medical History:   Diagnosis Date     Depression      Depressive disorder      Diabetes (H)      Hearing loss     wears hearing aid     History of blood clots      History of stroke      Hypertension      Stented coronary artery        Past Surgical History:   Procedure Laterality Date     ARTHROPLASTY KNEE Right 1/2/2019    Procedure: Right  Total Knee Arthroplasty;  Surgeon: Marco Cruz MD;  Location: [facility] OR     ARTHROSCOPY KNEE Right 2000     AS REVISE TOTAL HIP REPLACEMENT       CA ANESTH DX WRIST ARTHROSCOPY       CARDIAC SURGERY      coronary artery bypass     ENT SURGERY       EYE SURGERY       history of gallbladder surgery       history of heart valve replacement       history of hernia repair       history of peripheral vascular replacement  surgery         Medications: I have reviewed this patient's current medications.    Allergies: All allergies reviewed and addressed.    Family History: I have reviewed this patient's family history.    Social History:   Social History     Tobacco Use     Smoking status: Never Smoker     Smokeless tobacco: Never Used   Substance Use Topics     Alcohol use: Not on file     Tobacco use: Never    ROS:  The 10 point Review of Systems is negative other than noted in the HPI or here.    PHYSICAL EXAMINATION  Vital Signs:  B/P: 144/75,  T: 98.5,  P: 73,  R: 18    General:  patient lying in bed without any acute distress    HEENT:  normocephalic/atraumatic  Cardio:  RRR  Pulmonary:  no respiratory distress  Abdomen:  soft, non-distended  Extremities:  no edema, right LE wrapped s/p tka  Skin:  intact, warm/dry     Neurologic  Mental Status:  fully alert, attentive and oriented, follows commands, speech fluent  Cranial Nerves: visual fields intact, PERRL, EOMI, horizontal nystagmus more pronounced on left lateral gaze, facial sensation intact and symmetric, right eye ptosis (chronic) otherwise facial movements intact, hearing intact to conversation, palate elevation symmetric and uvula midline, no dysarthria, shoulder shrug strong bilaterally, tongue protrusion midline  Motor: no abnormal movements, no pronator drift, able to move all limbs spontaneously, strength 5/5 RUE, LUE, LLE (unable to assess RLE due to Rt TKA)  Sensory: Sensation intact to light touch throughout upper and lower extremities  Coordination: FNF intact without dysmetria, Rhomberg negative  Gait: Not assessed    Labs  Labs and Imaging reviewed and used in developing the plan; pertinent results included.     Lab Results   Component Value Date     (H) 01/04/2019     Imaging  Echo (11/12/18, care everywhere):  Normal left ventricular size. Normal left ventricular wall thickness. Low normal left ventricular ejection fraction. The ejection fraction is  visually estimated at 50-55%. Abnormal septal motion consistent with conduction delay. The right ventricle is normal in size and function. S/p mechanical AVR. Doppler interrogation is normal including mean gradient 9 mmHg, peak velocity 2 m/s, EOA 2.5 cm2, DVI 0.7). Trace aortic insufficiency. Normal pulmonary artery pressure. Normal IVC size.    CTH/CTA Head & Neck (1/2/19):  1. Head CTA demonstrates no aneurysm or stenosis of the major  intracranial arteries.   2. Neck CTA demonstrates no stenosis of the major cervical arteries.   3. No evidence of intracranial hemorrhage on the noncontrast head CT.  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    The patient was seen and discussed with the staff Dr. Pathak.    Doris Petersen MD  Neurology PGY1  Pager: [phone]

## 2019-01-04 NOTE — PLAN OF CARE
Discharge Planner OT   Patient plan for discharge: TCU  Current status: Patient CGA for supine to sit, ambulation to/from bathroom with walker.  Standing at sink 20 minutes for ADLs with strong CGA with 2 LOB during standing.  Patient limited by [place]/visual deficits at baseline.  Barriers to return to prior living situation: Pain, weakness, age, visual/auditory deficits  Recommendations for discharge: TCU; transfer to Campus today  Rationale for recommendations: Continued OT for maximum independence in ADLs/mobility       Entered by: Christine Hodge 01/04/2019 10:02 AM

## 2019-01-04 NOTE — PROGRESS NOTES
Admitted/transferred from: [facility] ED  Reason for admission/transfer: Tx for stroke work-up  Patient status upon admission/transfer: Stable on 4L NC  Interventions: Heparin gtt infusing upon arrival.   Plan: Frequent Neuro checks  2 RN skin assessment: completed by Nikki Au and Fiona Travis  Result of skin assessment and interventions/actions: Blanchable bruising on buttock. Rash on buttock and R hip area that is blanchable as well. Ace wrap on R leg from hip to foot  Height, weight, drug calc weight: done   Patient belongings: medical alert necklace and mug in room.   MDRO education (if applicable):

## 2019-01-04 NOTE — PLAN OF CARE
"Pt. Sitting for breakfast. Noted that patient has difficulty lifting left arm . When asked if he is okay, pt replied \"I think I had a stroke a minute ago\". Stroke code called at 8:50.Vitals in . .Left hand was not able to squeeze this writer hand , rt hand okay.c/o blurry vision. Code stroke came 8:55, Took over assessment. AM medications held per Dr. Lawson. Taken to CT via bed at 9:15  "

## 2019-01-04 NOTE — PLAN OF CARE
Pt is A&Ox4. VSS. LS clear, on RA. CPAP overnight. BS active, LBM on 1/3/2018. Voiding well. Pain managed with 2.5mg oxycodone. R knee surgical dressing is c/d/I. CMS intatc. CPM at 0-45. R PIV patent and SL. Pt up with 1 assist. Continue to monitor.

## 2019-01-04 NOTE — PROGRESS NOTES
"Orthopaedic Surgery Progress Note    Subjective: No acute events overnight. Pain controlled.    Denies fever or chills, CP, SOB, numbness or tingling, motor dysfunction or weakness.    Objective: /64   Pulse 60   Temp 97  F (36.1  C) (Oral)   Resp 18   Ht 1.715 m (5' 7.5\")   Wt 99.4 kg (219 lb 2.2 oz)   SpO2 96%   BMI 33.82 kg/m      Physical Exam:  General: healthy, alert, no distress  Respiratory: Breathing not labored.  MSK:  Focused examination of:   RLE: Toes WWP with BCR, + DP pulse. Fires EHL/FHL/GSC/T. SILT SP/DP/Sa/López/T. Dressing c/d/i.    Labs:   Recent Labs   Lab 01/04/19  0658 01/03/19  0551 01/02/19  0813   HGB 13.4 13.5 15.6       All cultures:  No results for input(s): CULT in the last 168 hours.    Assessment:  Chase Zhao is a 73 year old male who underwent a right TKA on 1/2.    Orthopaedics Primary; Hospitalist consult  Activity: up a jag  Weight bearing status: WBAT  Antibiotics: Ancef completed 1/3  Diet: regular  DVT prophylaxis: Lovenox bridge to warfarin  Bracing/Splinting: none  Dressings: Keep clean, dry and intact x 7 days post-op  Elevation: Elevate RLE on pillows to keep at the level of the heart while in bed  Drains: none  Physical Therapy/Occupational Therapy: Eval and treat for mobilization and ambulation     Follow-up: Clinic with Dr. Cruz in 2 weeks.     Disposition: Pending progress with therapies, pain control on orals, and medical stability, anticipate discharge today or tomorrow    Neto Stephen MD  Orthopaedic Resident, PGY-4  Pager: [phone]    "

## 2019-01-04 NOTE — PLAN OF CARE
1/3  15:0-23:30  VS: stable   O2 Sats 98% on room air  IS used independently to 2500   Output: Voiding spontaneously without difficulty, 200-250 ml   Bowels/BM Senokot S held this evening.  Day RN reported soft stools this morning   Activity Assist of 1, gait belt, walker  Ambulated to and from bathroom  Up in chair for supper   Skin: Blanchable redness on coccyx  Surgical incision right knee  Scaly patches, wife states eczema   Pain: Pt reporting more pain this evening.  Required one dose of oxycodone, 2.5 mg.  Pt did say it helped.   Ice pack to right knee, refreshed through out the shift   CMS: Intact, denies numbness/tingling   Dressing: Right knee dressing CDI   Diet: Regular, tolerated well  Pt is diabetic BS's AC & HS  Sliding scale and carb coverage also Humulin N daily   LDA: PIV right hand, saline locked, site WDL   Equipment: CPM 0-40 this shift  PCD's   Plan:    Additional Info: Pt was started on lovenox this shift.  Stated he has been giving himself lovenox injections prior to this hospital stay    Pt also reporting dry eyes, eye drops ordered from pharmacy.  Pt and his wife state they use them every 2 hours during the day.

## 2019-01-04 NOTE — PROGRESS NOTES
Social Work Services Progress Note    Hospital Day: 3    Collaborated with:  8A IDT, 6A SW Jami Engel, Admissions at [facility]    Data:  Discharge plan    Intervention:  Pt will transfer to [place] unit 6A. Report provided to 6A SW.     Writer had sent referrals via Epic to [facility] and [facility] 1/3/19 to begin discharge process and determine bed availability. [facility] called and offered private room at no additional charge. Writer has not had response from [facility]. This writer was going to meet w/pt and family and discuss discharge TCU options/bed availability but pt will transfer to [place].      His discharge needs are not clear at this time and may change based upon code called earlier today-ie: TCU vs. ARU. His Discharge needs/plan will con't to be assessed.     Writer contacted [facility] [phone] and left voicemail message with update.     Assessment:  Pt con't to have support of his family. They were not present during code this AM and may experience additional psycho-social stress. They may require additional psycho-social support to process their experience.    Plan:    Anticipated Disposition:  TCU    Barriers to d/c plan:  Medical stability    Follow Up:  Social work services to con't to follow for support and discharge planning

## 2019-01-04 NOTE — ED NOTES
Rapid Response called on pt on 8th floor.  Pt post op with right knee surgery on 1/2/2019.  Pt was having change to movement and sensation of left arm.  CT show no bleed and was started on low dose heparin @ 34257 units per hour in the ED.  Pt states when he was at [employer] he was having TIA's and his MD told him there was nothing they could do about it.  Pt has history of artificial heart valve and currently on coumadin and Lovenox 2 times per day.  VS @ 0858 were 100/61  84  16  96% RA  98.9.  Pt transferred to CT for head CT and angio of head then transferred to Adult ED.  POt dx with TIA and transferred to [place] unit 4A neuro overflow.  With notifed and message left on her answer machine.  Génesis Prasad RRT.

## 2019-01-04 NOTE — PROGRESS NOTES
"Stroke code/Transfer Note    S: Stroke code Called around 8.50 am for L arm numbness and weakness.     Patient seen an evaluated immediately.     Reports numbness of L UE mostly around elbow area and weakness L UE x 15 minutes. Denies any other focal deficit.   Reports similar numbness in the past, lasting 15-20 minutes.   Reportedly patient was working with therapy just prior to this.     Denies fever or chills. No cough or cp or sob. No LH or dizziness. No NVD. No dysuria or bowel complaint. No other concern.      Patient with significant medical hx. S/p R TKA on .    Hx of Prior Stroke.   History of mechanical aortic valve replacement in , on chronic warfarin with a goal INR of 2 to 3.    Echocardiogram from 2018 revealed ejection fraction of 50% to 55% with a normal aortic valve replacement function.   History of coronary artery disease.   HTN, DM. CECILIA.         OBJECTIVE:  /61 (BP Location: Right arm)   Pulse 60   Temp 96.5  F (35.8  C) (Axillary)   Resp 16   Ht 1.715 m (5' 7.5\")   Wt 99.4 kg (219 lb 2.2 oz)   SpO2 95%   BMI 33.82 kg/m      Temp (24hrs), Av.6  F (35.9  C), Min:96.4  F (35.8  C), Max:97  F (36.1  C)      General: alert, interactive, NAD, bit anxious. Hard of hearing.   HEENT: AT/NC, PERRLA, EOMI. 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   Neck: Supple. No JVD  Respi/Chest: Clear BL, no added sounds  CVS/Heart: S1S2 regular,     GI/Abd: Soft, non tender, non distended,   MSK/Extremities: Distal pulses 2+.  R LE on ace wraps.    Neuro: AO x 3. Mild L facial droop: ? New vs old. Otherwise CNs : grossly intact. Motor, sensory exam: slight pronator drift L UE. Numbness L UE around L elbow area. Unable to squeeze my finger L hand. Other exam intact.       ASSESSMENT & PLAN:     # Acute L UE weakness, numbess  # Stroke Code.     Hemodynamics: stable. Other neuro exam: intact. Remains alert oriented. Interactive.   Stat CT w/o contrast and CTA head and neck.   Stat labs cbc, cmp, trop ordered: " reviewed. Unremarkable.   ekg 12 lead ordered. Reviewed. No acute st t changes compared to prior. Nsr.   INR   Date Value Ref Range Status   01/04/2019 1.18 (H) 0.86 - 1.14 Final     Patient on Lovenox 30 bid bridge to Warfarin. inr subtherapeutic.   Patient escorted to CT and ED.   In ED, patient already reporting feeling better. Numbness R UE improving. Able to squeeze my finger better. Strength almost back to normal, around 9.50 am.     D.w. Radiology: no acute bleed. CTA - final read pending.   D.w Neurology staff Dr Pathak.   Rec: heparin drip. Low intensity w/ no loading dose for Mechanical aortic valve. Discontinue lovenox.   D.w Ortho: okay to start heparin drip.   Started in ED.     Transfer patient to Neurology 6A.     Patient and wife (called) updated, agrees with the plan.   Patient placement called.   Transfer order placed.       D/w RN.     Mg Lawson MD;   Internal Medicine   Pager: [phone]  1/4/2019

## 2019-01-04 NOTE — PHARMACY-ANTICOAGULATION SERVICE
Clinical Pharmacy - Warfarin Dosing Consult     Pharmacy has been consulted to manage this patient s warfarin therapy.  Indication: Mechanical Mitral Valve Replacement  Therapy Goal: INR 2-3  OP Anticoag Clinic: [facility] [place]  Warfarin PTA Regimen: 7.5 mg on Thurs and 5 mg all other days (per Chart Review encounter 12/21/18)  Significant drug interactions: LIH, aspirin    INR   Date Value Ref Range Status   01/04/2019 1.18 (H) 0.86 - 1.14 Final   01/04/2019 1.15 (H) 0.86 - 1.14 Final       Recommend warfarin 7.5 mg today.  Pharmacy will monitor Chase Zhao daily and order warfarin doses to achieve specified goal.      Please contact pharmacy as soon as possible if the warfarin needs to be held for a procedure or if the warfarin goals change.

## 2019-01-04 NOTE — PLAN OF CARE
SLP: Bedside swallow eval orders received. Pt admitted for stroke work-up, however RN and pt report symptoms have resolved. Pt passed nursing swallow screen and speech/language skills are at baseline function. Will defer and complete ST orders.

## 2019-01-05 ENCOUNTER — APPOINTMENT (OUTPATIENT)
Dept: OCCUPATIONAL THERAPY | Facility: CLINIC | Age: 74
DRG: 470 | End: 2019-01-05
Attending: ORTHOPAEDIC SURGERY
Payer: MEDICARE

## 2019-01-05 LAB
ANION GAP SERPL CALCULATED.3IONS-SCNC: 8 MMOL/L (ref 3–14)
BUN SERPL-MCNC: 16 MG/DL (ref 7–30)
CALCIUM SERPL-MCNC: 8.6 MG/DL (ref 8.5–10.1)
CHLORIDE SERPL-SCNC: 107 MMOL/L (ref 94–109)
CHOLEST SERPL-MCNC: 103 MG/DL
CO2 SERPL-SCNC: 24 MMOL/L (ref 20–32)
CREAT SERPL-MCNC: 0.84 MG/DL (ref 0.66–1.25)
ERYTHROCYTE [DISTWIDTH] IN BLOOD BY AUTOMATED COUNT: 15.5 % (ref 10–15)
GFR SERPL CREATININE-BSD FRML MDRD: 85 ML/MIN/{1.73_M2}
GLUCOSE BLDC GLUCOMTR-MCNC: 163 MG/DL (ref 70–99)
GLUCOSE BLDC GLUCOMTR-MCNC: 184 MG/DL (ref 70–99)
GLUCOSE BLDC GLUCOMTR-MCNC: 201 MG/DL (ref 70–99)
GLUCOSE BLDC GLUCOMTR-MCNC: 234 MG/DL (ref 70–99)
GLUCOSE BLDC GLUCOMTR-MCNC: 243 MG/DL (ref 70–99)
GLUCOSE SERPL-MCNC: 173 MG/DL (ref 70–99)
HCT VFR BLD AUTO: 40.1 % (ref 40–53)
HDLC SERPL-MCNC: 32 MG/DL
HGB BLD-MCNC: 12.7 G/DL (ref 13.3–17.7)
INR PPP: 1.23 (ref 0.86–1.14)
LDLC SERPL CALC-MCNC: 34 MG/DL
LMWH PPP CHRO-ACNC: 0.21 IU/ML
LMWH PPP CHRO-ACNC: 0.41 IU/ML
MCH RBC QN AUTO: 29.9 PG (ref 26.5–33)
MCHC RBC AUTO-ENTMCNC: 31.7 G/DL (ref 31.5–36.5)
MCV RBC AUTO: 94 FL (ref 78–100)
NONHDLC SERPL-MCNC: 71 MG/DL
PLATELET # BLD AUTO: 123 10E9/L (ref 150–450)
POTASSIUM SERPL-SCNC: 3.9 MMOL/L (ref 3.4–5.3)
RBC # BLD AUTO: 4.25 10E12/L (ref 4.4–5.9)
SODIUM SERPL-SCNC: 139 MMOL/L (ref 133–144)
TRIGL SERPL-MCNC: 188 MG/DL
WBC # BLD AUTO: 7.5 10E9/L (ref 4–11)

## 2019-01-05 PROCEDURE — 25000128 H RX IP 250 OP 636: Performed by: INTERNAL MEDICINE

## 2019-01-05 PROCEDURE — 85610 PROTHROMBIN TIME: CPT | Performed by: STUDENT IN AN ORGANIZED HEALTH CARE EDUCATION/TRAINING PROGRAM

## 2019-01-05 PROCEDURE — 36415 COLL VENOUS BLD VENIPUNCTURE: CPT | Performed by: STUDENT IN AN ORGANIZED HEALTH CARE EDUCATION/TRAINING PROGRAM

## 2019-01-05 PROCEDURE — 80048 BASIC METABOLIC PNL TOTAL CA: CPT | Performed by: STUDENT IN AN ORGANIZED HEALTH CARE EDUCATION/TRAINING PROGRAM

## 2019-01-05 PROCEDURE — 25000132 ZZH RX MED GY IP 250 OP 250 PS 637: Mod: GY | Performed by: PSYCHIATRY & NEUROLOGY

## 2019-01-05 PROCEDURE — A9270 NON-COVERED ITEM OR SERVICE: HCPCS | Mod: GY | Performed by: PSYCHIATRY & NEUROLOGY

## 2019-01-05 PROCEDURE — 12000001 ZZH R&B MED SURG/OB UMMC

## 2019-01-05 PROCEDURE — 97530 THERAPEUTIC ACTIVITIES: CPT | Mod: GO

## 2019-01-05 PROCEDURE — 85520 HEPARIN ASSAY: CPT | Performed by: PSYCHIATRY & NEUROLOGY

## 2019-01-05 PROCEDURE — 97535 SELF CARE MNGMENT TRAINING: CPT | Mod: GO

## 2019-01-05 PROCEDURE — 36415 COLL VENOUS BLD VENIPUNCTURE: CPT | Performed by: PSYCHIATRY & NEUROLOGY

## 2019-01-05 PROCEDURE — 80061 LIPID PANEL: CPT | Performed by: STUDENT IN AN ORGANIZED HEALTH CARE EDUCATION/TRAINING PROGRAM

## 2019-01-05 PROCEDURE — 40000133 ZZH STATISTIC OT WARD VISIT

## 2019-01-05 PROCEDURE — 85520 HEPARIN ASSAY: CPT | Performed by: STUDENT IN AN ORGANIZED HEALTH CARE EDUCATION/TRAINING PROGRAM

## 2019-01-05 PROCEDURE — 00000146 ZZHCL STATISTIC GLUCOSE BY METER IP

## 2019-01-05 PROCEDURE — 85027 COMPLETE CBC AUTOMATED: CPT | Performed by: STUDENT IN AN ORGANIZED HEALTH CARE EDUCATION/TRAINING PROGRAM

## 2019-01-05 PROCEDURE — 97168 OT RE-EVAL EST PLAN CARE: CPT | Mod: GO

## 2019-01-05 RX ORDER — WARFARIN SODIUM 7.5 MG/1
7.5 TABLET ORAL
Status: COMPLETED | OUTPATIENT
Start: 2019-01-05 | End: 2019-01-05

## 2019-01-05 RX ADMIN — HEPARIN SODIUM 1050 UNITS/HR: 10000 INJECTION, SOLUTION INTRAVENOUS at 11:50

## 2019-01-05 RX ADMIN — INSULIN ASPART 3 UNITS: 100 INJECTION, SOLUTION INTRAVENOUS; SUBCUTANEOUS at 12:26

## 2019-01-05 RX ADMIN — INSULIN ASPART 1 UNITS: 100 INJECTION, SOLUTION INTRAVENOUS; SUBCUTANEOUS at 08:34

## 2019-01-05 RX ADMIN — Medication 5 MG: at 09:52

## 2019-01-05 RX ADMIN — OMEPRAZOLE 20 MG: 20 CAPSULE, DELAYED RELEASE ORAL at 08:32

## 2019-01-05 RX ADMIN — METOPROLOL TARTRATE 25 MG: 25 TABLET, FILM COATED ORAL at 08:32

## 2019-01-05 RX ADMIN — ACETAMINOPHEN 975 MG: 325 TABLET, FILM COATED ORAL at 08:32

## 2019-01-05 RX ADMIN — Medication 5 MG: at 02:20

## 2019-01-05 RX ADMIN — Medication 5 MG: at 06:23

## 2019-01-05 RX ADMIN — CITALOPRAM HYDROBROMIDE 40 MG: 40 TABLET ORAL at 08:33

## 2019-01-05 RX ADMIN — CLONAZEPAM 1 MG: 1 TABLET ORAL at 20:21

## 2019-01-05 RX ADMIN — INSULIN ASPART 2 UNITS: 100 INJECTION, SOLUTION INTRAVENOUS; SUBCUTANEOUS at 18:13

## 2019-01-05 RX ADMIN — WARFARIN SODIUM 7.5 MG: 7.5 TABLET ORAL at 18:13

## 2019-01-05 RX ADMIN — SENNOSIDES AND DOCUSATE SODIUM 1 TABLET: 8.6; 5 TABLET ORAL at 20:22

## 2019-01-05 RX ADMIN — SENNOSIDES AND DOCUSATE SODIUM 1 TABLET: 8.6; 5 TABLET ORAL at 08:33

## 2019-01-05 RX ADMIN — ACETAMINOPHEN 975 MG: 325 TABLET, FILM COATED ORAL at 02:20

## 2019-01-05 RX ADMIN — Medication 5 MG: at 18:17

## 2019-01-05 RX ADMIN — Medication 5 MG: at 21:31

## 2019-01-05 RX ADMIN — METOPROLOL TARTRATE 25 MG: 25 TABLET, FILM COATED ORAL at 20:21

## 2019-01-05 RX ADMIN — ROSUVASTATIN CALCIUM 40 MG: 20 TABLET, FILM COATED ORAL at 20:21

## 2019-01-05 ASSESSMENT — ACTIVITIES OF DAILY LIVING (ADL)
ADLS_ACUITY_SCORE: 19
ADLS_ACUITY_SCORE: 19
ADLS_ACUITY_SCORE: 24
ADLS_ACUITY_SCORE: 22
ADLS_ACUITY_SCORE: 24
ADLS_ACUITY_SCORE: 19

## 2019-01-05 NOTE — OP NOTE
OPERATIVE REPORT    DATE OF SERVICE: 1/2/2019    SURGEON: Marco Cruz MD.    ASSISTANT(S):  Tyler SMITH and Neto Stephen MD     PREOPERATIVE DIAGNOSIS:  Osteoarthritis    POSTOPERATIVE DIAGNOSIS:  Osteoarthritis    OPERATION PERFORMED:  Right total knee arthroplasty    IMPLANTS:  [id number]   Implant Name Type Inv. Item Serial No.  Lot No. LRB No. Used   BONE CEMENT SIMPLEX W/TOBRAMYCIN 6197-9-001 Cement, Bone BONE CEMENT SIMPLEX W/TOBRAMYCIN 6197-9-001  [employer] [id number] Right 2   S &amp; N Mindy II Right Tibial Baseplate Sz 5                                       Total Joint Component/Insert   [employer] [id number] Right 1   IMP COMP FEMORAL SNN GEN II CR SZ 6 RT [id number] Total Joint Component/Insert IMP COMP FEMORAL SNN GEN II CR SZ 6 RT [id number]  [employer]  [id number] Right 1   IMP INSERT ARTICULAR S&amp;N LGN CR XLPE SZ5-6 13MM [id number] Total Joint Component/Insert IMP INSERT ARTICULAR S&amp;N LGN CR XLPE SZ5-6 13MM [id number]  [place]  [id number] Right 1       ANESTHETIC: General     OPERATIVE FINDINGS:  End stage arthrosis of the knee. Patella appropriate for patellar retention.     BLOOD LOSS: about 50 cc    TOURNIQUET TIME: < 2 hours     COMPLICATIONS:  None apparent    OPERATIVE INDICATIONS:  The patient has a long history of debilitating pain secondary to ostearthritis of the knee.  Despite comprehensive non-operative management these symptoms continued to interfere with activities of daily living.  After discussion of further treatment options including the risks and benefits that patient elected to proceed with a total knee.    DESCRIPTION OF THE PROCEDURE:  The patient was identified in the preoperative holding area.  The consent form including the risks and benefits were reviewed with the patient.  The operative limb was identified and marked.  The patient was brought back to the operating room and placed supine on the operating table.  An anesthetic was induced  by the anesthesia team.   The patient was prepped and draped in the normal standard fashion for a knee replacement.  A time-out was called.  Antibiotics were given.The tourniquet was inflated.  We utilized an approximately 15 cm curvilinear incision, centered on the patella ridge, and performed a standard parapatellar approach to the knee. There was normal appearing joint fluid seen upon arthrotomy. A modest medial release was performed. The patella was everted. Medial and lateral retractors were placed. The knee was brought into flexion. The AP axis of the knee was marked and an intra-medullary femoral guide gordo was placed as templated. The epicondylar axis was then marked. The anterior femoral cutting guide was placed and the epicondylar and AP axes were used to set the rotation of the jig. A stylus was then used to set the depth of the resection. Retractors were used to protect the skin and the anterior cut was made. The distal femoral cutting guide was then placed and pinned. The resection was set to remove the cartilage from the intra-condylar notch. The femoral sizing guide was then placed; the knee sized well to a 6. The four-in-one femoral cutting guide was then placed and pinned. The cuts were made. The trial component was well fit. Attention was then turned to the tibia. A PCL retractor was then placed and used to bring the tibia anterior. Medial and lateral retractors were placed. A tibial intra-medullary guide gordo was placed. The tibial cutting guide was then assembled on the guide gordo. The slope was set to nearly mirror the anatomic slope. The tibial cut was first checked with a two-and-nine guide and the cut was then made. A lamina  was then used to remove the necessary bone from the posterior condyles and then the remaining meniscus was removed. The tibia and was sized and it sized well to a 5. The trial components were then assembled and 9, 11, and 13 mms trial polys were used for trialing.  The knee came out into full extension and the knee had greater than 120 degrees of flexion. It was well balanced in the coronal plane with varus and valgus stress at 0 and 30 degrees of flexion. Happy with the reconstruction the tibial rotation was marked, the trial components were removed, and all cancellous surfaces were cleaned with a pulse lavage and then dried. The components were cemented into place, a trial poly placed, the knee brought into extension, and the cement was allowed to dry. With the cement dry the knee was lavaged. The tourniquet was let down and hemostasis was achieved. The knee was then brought into extension and the final poly was placed. It was seen to lock into place. The soft tissues were then injected with analgesic. The capsule was closed over a drain. The capsule was closed with interrupted Vicryl, the dermis with interrupted Vicryl, and skin with running monocryl, Dermabond and steri-strips.  At the end of the procedure the sponge and needle counts were correct times two.  The patient tolerated the procedure well and returned to the PAR extubated and stable.    POSTOPERATIVE PLAN:  1. Weight bearing as tolerated  2. DVT prophylaxis   3. 24 hours of prophylactic antibiotics  4. Follow-up:  Wound clinic in 2 weeks and with Anthony in clinic in 6 weeks for x-rays and a rehabilitation check.

## 2019-01-05 NOTE — PROGRESS NOTES
[facility]  Neurology Progress Note  01/05/19    Patient Name: Chase Zhao    Interval events:  [facility], patient arrived late last night.    Heparin gtt started yesterday, at goal today 0.21 Xa      Objective:  B/P: 127/71, T: 97.5, P: 70, R: 18    GENERAL: NAD, lying on bed  HEENT: NC/AT. Mucous membranes moist.  CARDIAC: RRR without  Murmur.  RESPIRATORY: Good effort. CTAB. No w/r/r appreciated.  ABDOMINAL: Soft, non tender, non distended. + BS.   EXTREMITIES: Warm, dry.     NEUROLOGIC:  MS:   CN:    II - blind in left eye, decreased vision in right eye  III, IV, VI - EOMI, PERRL, no nystagmus noted, right eye ptosis (baseline)  V - facial sensation intact and equal   VII - facial movement symmetrical  VIII - hearing aids bilaterallyl  IX, X - uvula midline  XII - tongue midline with full ROM  MOTOR: normal tone throughout, no abnormal movements, strength 5/5 ASIDE FROM RLE which cannot be assessed and LUE which is 4+ throughout (baseline)  SENSORY: intact and symmetric to light touch throughout upper and lower extremities  REFLEXES: 2+ and symmetric in patellar, biceps, and brachioradialis  COORDINATION:   GAIT:      National Institutes of Health Stroke Scale  Exam Interval: 24 hours post onset of symptom +/- 20 minutes   Score    Level of consciousness: (0)   Alert, keenly responsive    LOC questions: (0)   Answers both questions correctly    LOC commands: (0)   Performs both tasks correctly    Best gaze: (0)   Normal    Visual: (0)   No visual loss    Facial palsy: (0)   Normal symmetrical movements    Motor arm (left): (0)   No drift    Motor arm (right): (0)   No drift    Motor leg (left): (0)   No drift    Motor leg (right): (0)   No drift    Limb ataxia: (0)   Absent    Sensory: (0)   Normal- no sensory loss    Best language: (0)   Normal- no aphasia    Dysarthria: (0)   Normal    Extinction and inattention: (0)   No abnormality        Total Score:  0     Imaging      Pertinent  Studies  LDL 34  A1C 6.7  Echocardiogram [place] RICHARDS ID: [id number] Age: 73 : 1945 Ordering Provider: MAXIMINO NETTLES   Exam Date: 2018 12:20 Gender: M Sonographer: EKATERINA   Accession #: [id number] Height: 68 in BSA: 2.16 m  BP: 116 / 60   Weight: 228 lbs BMI: 34.7 kg/m  HR: 60         Site: [facility]   Location: Outpatient Rhythm: NSR   Procedure Components: 2D imaging with contrast, Color Doppler, Spectral Doppler   Indications: Coronary atherosclerosis due to lipid rich plaque; Coronary atherosclerosis due   to lipid rich plaque   Technical Quality: Contrast: Definity   Previous Study: 11   Final Conclusion   Normal left ventricular size. Normal left ventricular wall thickness. Low normal left   ventricular ejection fraction. The ejection fraction is   visually estimated at 50-55%. Abnormal septal motion consistent with conduction delay.   The right ventricle is normal in size and function.   S/p mechanical AVR. Doppler interrogation is normal including mean gradient 9 mmHg, peak   velocity 2 m/s, EOA 2.5 cm2, DVI 0.7).   Trace aortic insufficiency.   Normal pulmonary artery pressure.   Normal IVC size.     Compared to prior study on 11, there has been no significant change.   Estimated EF: 50-55%     FINDINGS   Left Ventricle Normal left ventricular size. Normal left ventricular systolic function.    Normal left ventricular wall thickness. The   ejection fraction is visually estimated at 50-55%. Abnormal septal motion consistent with   conduction delay.   Diastolic Function Grade I diastolic dysfunction.   Right Ventricle The right ventricle is normal in size and function.   Left Atrium Mild left atrial enlargement.   Right Atrium Mild right atrial enlargement.   Aortic Valve S/p mechanical AVR. Doppler interrogation is normal including mean gradient 9   mmHg, peak velocity 2 m/s, EOA   2.5 cm2, DVI 0.7). Trace aortic insufficiency.   Mitral Valve Normal mitral  valve. Trace mitral regurgitation.   Tricuspid Valve Normal tricuspid valve. Trace tricuspid regurgitation. Estimated pulmonary   artery systolic pressure is normal.   Pulmonic Valve Limited view of the pulmonic valve without significant stenosis. No significant   pulmonic regurgitation.   Pericardium Trace pericardial effusion.   Aorta Aortic root and ascending aorta are not well visualized.   Inferior Vena Cava Normal inferior vena cava (IVC) size.   Other None.   MEASUREMENTS  (Male / Female) Normal Values   2D MEASUREMENTS AND LV FUNCTION   IVS Diastolic Thickness           0.882 cm              < 1.1 cm / < 1.0 cm   LV Diastolic Diameter PLAX        5.29 cm               4.2 - 5.9 / 3.9 - 5.3 cm   LVPW Diastolic Thickness          1.13 cm               < 1.1 cm / < 1.0 cm   LV Systolic Diameter PLAX         3.79 cm   LVOT Diameter                     2.2 cm   LVOT Cardiac Output               6.6 l/min   LVOT Cardiac Index                2.91 l/min m    LVOT Stroke Volume                110 ml   Stroke Volume Index               48.6 ml/m    LA Systolic Diameter LX           3.6 cm                3.0 - 4.0 / 2.7 - 3.8 cm   LV Mass                           201 g   LV Mass Index                     90.4 g/m      DIASTOLOGY   Mitral E Point Velocity           0.642 m/sec           0.70 - 1.02 m/sec   Mitral A Point Velocity           0.963 m/sec           0.06 - 1.06 m/sec   Mitral E to A Ratio               0.667                 1.1 - 2.1   MV Deceleration Time              320 msec              167 - 231 msec   LV E' Lateral Velocity            0.0633 m/sec   Mitral E to LV E' Lateral Ratio   10.1   LV E' Septal Velocity             0.0351 m/sec   Mitral E to LV E' Septal Ratio    18.3     AORTIC VALVE   AV Peak Velocity                  2 m/sec               < 2.0 m/sec   AV Peak Gradient                  16 mmHg   AV Mean Gradient                  9 mmHg   AV Velocity Time Integral         39.5 cm   LVOT Peak  Velocity                1.33 m/sec   LVOT Velocity Time Integral       28.9 cm   AV Area Cont Eq vti               2.79 cm    AV Area Cont Eq pk                2.52 cm    AV Dimensionless Index            0.733    Assessment & Plan:  Chase Zhao is a 73 year old male with history of mechanical AVR (on chronic warfarin), CAD, HTN, DM, CECILIA who presents s/p right TKA on 1/2/19 with left arm numbness and weakness. He is on warfarin and aspirin 81 at home, which were being held in the setting of surgery. Symptoms resolved within 30 mins. He describes similar episodes once a month for a few years. CTH/CTA head and neck revealed no LVO or acute hemorrhage. Pt was transferred for further work up of possible minor stroke vs TIA.     ACUTE PROBLEMS  #Transient Ischemic Attack vs. Minor Right hemispheric stroke  - ABCD2 Score: 5   - Admit to Neurology  - Neurochecks  - Euthermia, Euglycemia  - Hold PTA aspirin, currently being bridged to home Warfarin (see below)  - PTA Rosuvastatin 40 mg  - TTE (11/12/18): EF 50% to 55%, normal AVR function, no thrombus, abnormal septal motion consistent with conduction delay, TTE pending for this admission  - 24-hour Telemetry  - Bedside Glucose Monitoring  - A1c 6.7  - Lipid Panel ordered  - PT/OT/SLP  - Stroke Education  - Depression Screen  - Apnea Screen: already has CECILIA     CHRONIC Medical Problems:  #s/p Rt TKA (1/2/19):  -Ortho consult  -Pain consult  -scheduled tylenol  -Dilaudid 0.2 mg q2h prn  -Oxycodone 2.5-5 mg q3h prn     #CAD w/ fixed apical defect   #Hx of mechanical AVR (2005)  -on chronic Warfarin, goal 2-3  -Echo 11/2018 revealed ejection fraction of 50% to 55% with a normal aortic valve replacement function.   -Held for Rt TKA 1/2/19 resumed 1/4 PM  -Hep gtt to bridge back to home warfarin, orthopedics ok with lovenox >40BMI bridge to wafarin and plan to discontinue heparin gtt 1/5  -Hold PTA Aspirin     #Depression  -PTA Celexa     #RLS  -PTA  Clonazepam     #Paroxysmal Afib  -PTA Metoprolol 25 BID  -on chronic Warfarin; held for right TKA 1/2   -Started Hep gtt to bridge back to home warfarin  -lovenox bridge 1/5/2019 and discontinued hep gtt     #HTN  -PTA Metoprolol 25 BID  -Hydralazine PRN  -Labetalol PRN     #Type 2 DM  -Hold PTA Victoza  -sliding scale insulin medium dose     #CECILIA: on CPAP/biPAP at night     #GERD  -PTA Omeprazole     #Hx of glaucoma     #Bowel Regimen  -Senna BID   -Miralax PRN  -Dulcolax PRN     Fluids/Electrolytes/Nutrition: PO, Regular diet  DVT Prophylaxis: Heparin gtt, bridging back to home warfarin  GI Prophylaxis: PPI  Code Status: Full Code  Disposition: patient lives far away, TCU placement may be difficult, requires 1-2 more hospital days to obtain TTE and initiate warfarin    Patient was seen and discussed with attending doctor, Dr. Pathak.    Jasmeet Nova MD/PhD  Neurology PGY2  Pager: [phone]

## 2019-01-05 NOTE — PROGRESS NOTES
Social Work Services Progress Note    Hospital Day: 4  Date of Initial Social Work Evaluation:  In progress    Collaborated with:  Pt spouse and son, bedside team, [facility] Admission,  EMR    Data:  SW following pt for discharge planning    Intervention:  SW contacted by charge RN to meet with pt family at bedside for TCU questions. Pt spouse and son Rodolfo wanted to discuss discharge to TCU facilities they were able to visit earlier today. Pt had been previously accepted at [facility] for TCU following knee surgery but family explored other options. Spouse shared she would like a facility she could get to daily and preferred a referral be made to [facility]). SW contacted facility for availability and asked if there was an additional room charge, at family request. Facility shared that there were numerous male beds available and pt would not pay anything additional for a shared room. Family reports needing transportation to be scheduled at discharge and requested an estimate of cost.     Family declines to make any additional referrals at this time.    Assessment:  See RN, PT/OT, medical team notes    Plan:    Anticipated Disposition:  Facility:  TBD (referral sent to [facility])    Barriers to d/c plan:  Accepting facility, medical stability    Follow Up:  SW will continue to follow for discharge needs    Kieran SHEARER  Weekend SW  Pager: [phone]  On Call Pager: [phone] 4pm - Midnight

## 2019-01-05 NOTE — PROGRESS NOTES
01/05/19 1000   Quick Adds   Type of Visit Occupational Therapy Re-evaluation   Living Environment   Lives With spouse   Living Arrangements house   Home Accessibility stairs to enter home   Living Environment Comment Patient has 2 stairs to enter with left rail. Does not have to complete stairs within and can remain on one level   Self-Care   Usual Activity Tolerance moderate   Current Activity Tolerance fair   Equipment Currently Used at Home cane, straight;walker, rolling   Activity/Exercise/Self-Care Comment Patient ambulates with cane in house and walker for longer distances.  Patient has assist for ADLs at times due to vision and balance, wife A with meds,  has assist for bathing, donning socks and shoes.    Functional Level   Ambulation 1-->assistive equipment   Transferring 1-->assistive equipment   Toileting 1-->assistive equipment   Bathing 1-->assistive equipment   Dressing 2-->assistive person   Eating 0-->independent   Communication 0-->understands/communicates without difficulty   Swallowing 0-->swallows foods/liquids without difficulty   Cognition 0 - no cognition issues reported   Fall history within last six months yes   Number of times patient has fallen within last six months 4   Which of the above functional risks had a recent onset or change? ambulation;transferring;toileting;dressing       Present no   General Information   Onset of Illness/Injury or Date of Surgery - Date 01/02/19   Referring Physician Doris Petersen   Patient/Family Goals Statement return home to baseline   Additional Occupational Profile Info/Pertinent History of Current Problem Chase Zhao is a 73 year old male with history of mechanical AVR (on chronic warfarin), CAD, HTN, DM, CECILIA who presents s/p right TKA on 1/2/19 with left arm numbness and weakness. He is on warfarin and aspirin 81 at home, which were being held in the setting of surgery. Symptoms resolved within 30 mins. He describes  similar episodes once a month for a few years. CTH/CTA head and neck revealed no LVO or acute hemorrhage. Pt was transferred for further work up of possible minor stroke vs TIA.   Precautions/Limitations fall precautions  (TKA )   Weight-Bearing Status - LUE full weight-bearing   Weight-Bearing Status - RUE full weight-bearing   Weight-Bearing Status - LLE full weight-bearing   Weight-Bearing Status - RLE weight-bearing as tolerated   General Observations up with assist   Cognitive Status Examination   Orientation orientation to person, place and time   Level of Consciousness lethargic/somnolent   Follows Commands (Cognition) follows one step commands   Memory intact   Visual Perception   Visual Perception Wears glasses   Visual Perception Comments L eye blindness with limited eye sight in R eye   Pain Assessment   Patient Currently in Pain No   Integumentary/Edema   Integumentary/Edema no deficits were identifed   Posture   Posture forward head position   Range of Motion (ROM)   ROM Quick Adds No deficits were identified;Other (describe)  (with excpetion of R knee )   Strength   Strength Comments B UE 4/5, R>L. pt L hand dominant at baseline    Bed Mobility Skill: Supine to Sit   Level of [place]: Supine/Sit minimum assist (75% patients effort)   Transfer Skill: Bed to Chair/Chair to Bed   Level of [place]: Bed to Chair minimum assist (75% patients effort)   Weight-Bearing Restrictions weight-bearing as tolerated   Assistive Device - Transfer Skill Bed to Chair Chair to Bed Rehab Eval standard walker   Transfer Skill: Sit to Stand   Level of [place]: Sit/Stand minimum assist (75% patients effort)   Upper Body Dressing   Level of [place]: Dress Upper Body minimum assist (75% patients effort)   Lower Body Dressing   Level of [place]: Dress Lower Body maximum assist (25% patients effort)   Toileting   Level of [place]: Toilet moderate assist (50% patients effort)   Eating/Self Feeding  "  Level of [place]: Eating stand-by assist   Instrumental Activities of Daily Living (IADL)   IADL Comments wife assisted as needed   Activities of Daily Living Analysis   Impairments Contributing to Impaired Activities of Daily Living balance impaired;coordination impaired;flexibility decreased;pain;post surgical precautions;ROM decreased;strength decreased   General Therapy Interventions   Planned Therapy Interventions ADL retraining;neuromuscular re-education;strengthening;transfer training;home program guidelines;progressive activity/exercise   Clinical Impression   Criteria for Skilled Therapeutic Interventions Met yes, treatment indicated   OT Diagnosis impaired self-cares/mobility   Influenced by the following impairments pain; decreased ROM, L side weakness   Assessment of Occupational Performance 3-5 Performance Deficits   Identified Performance Deficits dressing bathing toileting, transfers   Clinical Decision Making (Complexity) Moderate complexity   Therapy Frequency other (see comments)  (6x/week)   Predicted Duration of Therapy Intervention (days/wks) 1 week   Anticipated Discharge Disposition Transitional Care Facility   Risks and Benefits of Treatment have been explained. Yes   Patient, Family & other staff in agreement with plan of care Yes   [facility] AM-PAC  \"6 Clicks\" Daily Activity Inpatient Short Form   1. Putting on and taking off regular lower body clothing? 1 - Total   2. Bathing (including washing, rinsing, drying)? 2 - A Lot   3. Toileting, which includes using toilet, bedpan or urinal? 2 - A Lot   4. Putting on and taking off regular upper body clothing? 3 - A Little   5. Taking care of personal grooming such as brushing teeth? 3 - A Little   6. Eating meals? 4 - None   Daily Activity Raw Score (Score out of 24.Lower scores equate to lower levels of function) 15   Total Evaluation Time   Total Evaluation Time (Minutes) 10     "

## 2019-01-05 NOTE — PLAN OF CARE
Discharge Planner OT   Patient plan for discharge: rehab  Current status: OT reeval completed, POC initiated. Pt ambulates with wc follow min A/CGA and 2 LOB requiring min A to correct. Max A LB dressing, mod A toileting.   Barriers to return to prior living situation: weakness, post op pain, fall risk  Recommendations for discharge: TCU  Rationale for recommendations: pt will require ongoing skilled therapy to progress back to PLOF.       Entered by: Dimple Palmer 01/05/2019 12:22 PM

## 2019-01-05 NOTE — PROGRESS NOTES
Received consult in [facility] for RNCC discharge planning as part of stroke order set.  Patient discharging to TCU and followed by SW, will defer consult to SW. Discussed with 6th floor weekend .      Tex Silva, RN, PHN, ACM  RN Care Coordinator   [address] [address]   [email or web address]   Casual Employee - Weekend Coverage only  To contact weekend RNCC, dial [phone] and enter pager number [phone] at prompt.  This pager can not be contacted by text page or outside line.

## 2019-01-05 NOTE — PROGRESS NOTES
Pt transferred to  at 2245. Transferred via bed with float-float RN, on monitor. Report called prior to transfer. Meds and belongings with pt on transfer.

## 2019-01-05 NOTE — PLAN OF CARE
D/I: Received pt around 2245 after getting report from Fiona on 4A. Patient here S/P R knee surgery; then had neuro change of left arm weakness and numbness - CT scan negative. MHx of frequent TIAs.  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  CV- AVSS, afebrile  Pulm- 2L NC during the day, and bipap at night  GI-  regular diet, sliding scale insulin, last BM 1/3/19  - has urgency VDSP using urinal at bedside (needs to stand at bedside)   Gtts- hep 1,200units/hr, last hep 10A at goal of 0.21  Skin- ace wrap to RLE with Aquacel dressing underneath per note.  Pain- Right knee pain with movement moderately controlled with prn oxy q3hr 5mg - needs to be offered otherwise pt forgets about it yet has pain.  P: Continue to bridge to Coumadin and then find placement at TCU - SW consult placed d/t wife having concerns with location of placement (they live far away). Needs CPM placed during the day. Will continue to monitor and follow with POC.

## 2019-01-05 NOTE — PROGRESS NOTES
"Orthopaedic Surgery Progress Note  Stroke code yesterday, transferred to [facility] for neurology evaluation.     Subjective: No acute events overnight. Pain controlled.   Denies fever or chills, CP, SOB, numbness or tingling, motor dysfunction or weakness.    Objective: /71 (BP Location: Right arm)   Pulse 70   Temp 97.5  F (36.4  C) (Oral)   Resp 18   Ht 1.715 m (5' 7.5\")   Wt 101.7 kg (224 lb 3.3 oz)   SpO2 95%   BMI 34.60 kg/m      Physical Exam:  General: healthy, alert, no distress  Respiratory: Breathing not labored.  MSK:  Focused examination of:   RLE: Toes WWP with BCR, + DP pulse. Fires EHL/FHL/GSC/T. SILT SP/DP/Sa/López/T. Dressing c/d/i. Small blister and bruising under the ace wrap.     Labs:   Recent Labs   Lab 01/05/19  0628 01/04/19  1240 01/04/19  0905 01/04/19  0658 01/03/19  0551 01/02/19  0813   HGB 12.7* 13.6 14.4 13.4 13.5 15.6   WBC 7.5 7.5 8.2  --   --   --        All cultures:  No results for input(s): CULT in the last 168 hours.    Assessment:  Chase Zhao is a 73 year old male who underwent a right TKA on 1/2.    Orthopaedics Primary; Hospitalist, neurology  Activity: up a jag  Weight bearing status: WBAT  Antibiotics: Ancef completed 1/3  Diet: regular  DVT prophylaxis: per medicine, should go home on warfarin (home dose)  Bracing/Splinting: none  Dressings: Keep clean, dry and intact x 7 days post-op  Elevation: Elevate RLE on pillows to keep at the level of the heart while in bed  Drains: none  Physical Therapy/Occupational Therapy: Eval and treat for mobilization and ambulation     Follow-up: Clinic with Dr. Cruz in 2 weeks.     Disposition: Pending progress with therapies, pain control on orals, and medical stability, anticipate discharge to TCU in 1-2 days    Shayna Orr MD   PGY-4 Orthopaedic Surgery   [phone]        "

## 2019-01-05 NOTE — PLAN OF CARE
D/I: Patient ad to 4a for stroke code, CT neg, hx of TIA's. 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   A: Stable at this time  P: Cont to monitor, notify MD with any changes or concerns

## 2019-01-06 ENCOUNTER — APPOINTMENT (OUTPATIENT)
Dept: PHYSICAL THERAPY | Facility: CLINIC | Age: 74
DRG: 470 | End: 2019-01-06
Attending: ORTHOPAEDIC SURGERY
Payer: MEDICARE

## 2019-01-06 LAB
ERYTHROCYTE [DISTWIDTH] IN BLOOD BY AUTOMATED COUNT: 15.3 % (ref 10–15)
GLUCOSE BLDC GLUCOMTR-MCNC: 205 MG/DL (ref 70–99)
GLUCOSE BLDC GLUCOMTR-MCNC: 240 MG/DL (ref 70–99)
GLUCOSE BLDC GLUCOMTR-MCNC: 297 MG/DL (ref 70–99)
GLUCOSE BLDC GLUCOMTR-MCNC: 304 MG/DL (ref 70–99)
GLUCOSE BLDC GLUCOMTR-MCNC: 357 MG/DL (ref 70–99)
HCT VFR BLD AUTO: 41.3 % (ref 40–53)
HGB BLD-MCNC: 12.9 G/DL (ref 13.3–17.7)
INR PPP: 1.32 (ref 0.86–1.14)
LMWH PPP CHRO-ACNC: <0.1 IU/ML
MCH RBC QN AUTO: 29.4 PG (ref 26.5–33)
MCHC RBC AUTO-ENTMCNC: 31.2 G/DL (ref 31.5–36.5)
MCV RBC AUTO: 94 FL (ref 78–100)
PLATELET # BLD AUTO: 146 10E9/L (ref 150–450)
RBC # BLD AUTO: 4.39 10E12/L (ref 4.4–5.9)
WBC # BLD AUTO: 7.4 10E9/L (ref 4–11)

## 2019-01-06 PROCEDURE — 25000132 ZZH RX MED GY IP 250 OP 250 PS 637: Mod: GY | Performed by: PSYCHIATRY & NEUROLOGY

## 2019-01-06 PROCEDURE — A9270 NON-COVERED ITEM OR SERVICE: HCPCS | Mod: GY | Performed by: STUDENT IN AN ORGANIZED HEALTH CARE EDUCATION/TRAINING PROGRAM

## 2019-01-06 PROCEDURE — 00000146 ZZHCL STATISTIC GLUCOSE BY METER IP

## 2019-01-06 PROCEDURE — 25000132 ZZH RX MED GY IP 250 OP 250 PS 637: Mod: GY | Performed by: STUDENT IN AN ORGANIZED HEALTH CARE EDUCATION/TRAINING PROGRAM

## 2019-01-06 PROCEDURE — 85027 COMPLETE CBC AUTOMATED: CPT | Performed by: STUDENT IN AN ORGANIZED HEALTH CARE EDUCATION/TRAINING PROGRAM

## 2019-01-06 PROCEDURE — 40000193 ZZH STATISTIC PT WARD VISIT

## 2019-01-06 PROCEDURE — 85520 HEPARIN ASSAY: CPT | Performed by: STUDENT IN AN ORGANIZED HEALTH CARE EDUCATION/TRAINING PROGRAM

## 2019-01-06 PROCEDURE — 97164 PT RE-EVAL EST PLAN CARE: CPT | Mod: GP

## 2019-01-06 PROCEDURE — A9270 NON-COVERED ITEM OR SERVICE: HCPCS | Mod: GY | Performed by: PSYCHIATRY & NEUROLOGY

## 2019-01-06 PROCEDURE — 25000128 H RX IP 250 OP 636: Performed by: PSYCHIATRY & NEUROLOGY

## 2019-01-06 PROCEDURE — 12000001 ZZH R&B MED SURG/OB UMMC

## 2019-01-06 PROCEDURE — 97110 THERAPEUTIC EXERCISES: CPT | Mod: GP

## 2019-01-06 PROCEDURE — 97116 GAIT TRAINING THERAPY: CPT | Mod: GP

## 2019-01-06 PROCEDURE — 97530 THERAPEUTIC ACTIVITIES: CPT | Mod: GP

## 2019-01-06 PROCEDURE — 36415 COLL VENOUS BLD VENIPUNCTURE: CPT | Performed by: STUDENT IN AN ORGANIZED HEALTH CARE EDUCATION/TRAINING PROGRAM

## 2019-01-06 PROCEDURE — 85610 PROTHROMBIN TIME: CPT | Performed by: STUDENT IN AN ORGANIZED HEALTH CARE EDUCATION/TRAINING PROGRAM

## 2019-01-06 RX ORDER — ASPIRIN 81 MG/1
81 TABLET ORAL DAILY
Status: DISCONTINUED | OUTPATIENT
Start: 2019-01-06 | End: 2019-01-07 | Stop reason: HOSPADM

## 2019-01-06 RX ORDER — WARFARIN SODIUM 10 MG/1
10 TABLET ORAL
Status: COMPLETED | OUTPATIENT
Start: 2019-01-06 | End: 2019-01-06

## 2019-01-06 RX ADMIN — Medication 5 MG: at 16:25

## 2019-01-06 RX ADMIN — SENNOSIDES AND DOCUSATE SODIUM 1 TABLET: 8.6; 5 TABLET ORAL at 20:57

## 2019-01-06 RX ADMIN — BISACODYL 10 MG: 10 SUPPOSITORY RECTAL at 08:16

## 2019-01-06 RX ADMIN — INSULIN ASPART 4 UNITS: 100 INJECTION, SOLUTION INTRAVENOUS; SUBCUTANEOUS at 18:34

## 2019-01-06 RX ADMIN — ENOXAPARIN SODIUM 80 MG: 80 INJECTION SUBCUTANEOUS at 10:39

## 2019-01-06 RX ADMIN — Medication 5 MG: at 03:54

## 2019-01-06 RX ADMIN — ASPIRIN 81 MG: 81 TABLET, COATED ORAL at 16:24

## 2019-01-06 RX ADMIN — ROSUVASTATIN CALCIUM 40 MG: 20 TABLET, FILM COATED ORAL at 21:49

## 2019-01-06 RX ADMIN — INSULIN ASPART 3 UNITS: 100 INJECTION, SOLUTION INTRAVENOUS; SUBCUTANEOUS at 08:17

## 2019-01-06 RX ADMIN — INSULIN ASPART 5 UNITS: 100 INJECTION, SOLUTION INTRAVENOUS; SUBCUTANEOUS at 13:01

## 2019-01-06 RX ADMIN — ENOXAPARIN SODIUM 80 MG: 80 INJECTION SUBCUTANEOUS at 21:49

## 2019-01-06 RX ADMIN — OMEPRAZOLE 20 MG: 20 CAPSULE, DELAYED RELEASE ORAL at 08:17

## 2019-01-06 RX ADMIN — WARFARIN SODIUM 10 MG: 10 TABLET ORAL at 20:58

## 2019-01-06 RX ADMIN — ACETAMINOPHEN 650 MG: 325 TABLET, FILM COATED ORAL at 03:54

## 2019-01-06 RX ADMIN — METOPROLOL TARTRATE 25 MG: 25 TABLET, FILM COATED ORAL at 08:17

## 2019-01-06 RX ADMIN — Medication 5 MG: at 00:29

## 2019-01-06 RX ADMIN — CLONAZEPAM 1 MG: 1 TABLET ORAL at 21:49

## 2019-01-06 RX ADMIN — Medication 5 MG: at 13:02

## 2019-01-06 RX ADMIN — SENNOSIDES AND DOCUSATE SODIUM 2 TABLET: 8.6; 5 TABLET ORAL at 08:16

## 2019-01-06 RX ADMIN — CITALOPRAM HYDROBROMIDE 40 MG: 40 TABLET ORAL at 08:16

## 2019-01-06 RX ADMIN — METOPROLOL TARTRATE 25 MG: 25 TABLET, FILM COATED ORAL at 20:57

## 2019-01-06 ASSESSMENT — ACTIVITIES OF DAILY LIVING (ADL)
ADLS_ACUITY_SCORE: 22

## 2019-01-06 NOTE — PLAN OF CARE
D/I: Patient here S/P R knee surgery; then had neuro change of left arm weakness and numbness - CT scan was negative. MHx of frequent TIAs.  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  CV- AVSS, afebrile  Pulm-  Bipap at night   GI-  regular diet, sliding scale insulin, last BM 1/3/19  - has urgency VDSP using urinal at bedside (needs to stand at bedside)   Gtts- PIV SL  Skin- ace wrap to RLE with Aquacel dressing underneath per note. Taran able redness on coccyx - pt independently repositioning. CPM re-started at 45 degrees at 0000- tolerating well throughout the night.  Pain- Right knee pain well controlled with prn oxy q3hr 5mg .  P: Discharge awaiting placement at TCU - SW is in the process. Still needs bubble study today. Will continue to monitor and follow with POC.

## 2019-01-06 NOTE — PLAN OF CARE
D/I: Patient here S/P R knee surgery; then had neuro change of left arm weakness and numbness - CT scan was negative. MHx of frequent TIAs.  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  CV- AVSS, afebrile  Pulm-  Sats 94% on room air. Bipap at night   GI-  regular diet, sliding scale insulin, last BM 1/3/19  - has urgency VDSP using urinal at bedside (needs to stand at bedside) ambulated to BR x1.   Gtts- PIV SL  Skin- ace wrap to RLE with Aquacel dressing underneath per note. Taran able redness on coccyx - pt independently repositioning. CPM re-started at 45 degrees.  On at night. - tolerating well throughout the night.  Pain- Right knee pain well controlled with prn oxy q3hr 5mg .  P: Discharge awaiting placement at TCU - SW is in the process. Still needs bubble study today or Monday. . Will continue to monitor and follow with POC.

## 2019-01-06 NOTE — PROGRESS NOTES
"Orthopaedic Surgery Progress Note   January 6, 2019    Subjective: No acute events overnight. Pain controlled. Using CPM. Tolerating diet. Voiding spontaneously. +BM 2 days ago. Denies n/t. Symptoms to UE have completely resolved.     Objective: /82 (BP Location: Left arm)   Pulse 83   Temp 97.8  F (36.6  C) (Oral)   Resp 16   Ht 1.715 m (5' 7.5\")   Wt 101.7 kg (224 lb 3.3 oz)   SpO2 95%   BMI 34.60 kg/m      General: NAD, alert and oriented, cooperative with exam.   Cardio: RRR, extremities wwp.   Respiratory: Non-labored breathing.  MSK: RLE: Toes wwp, DP 2+, bcr in all toes. +EHL/FHL/GSC/TA with 5/5 strength. SILT SP/DP/Sa/López/T. Dressing c/d/i.     Labs:   Hgb 12.9  Plts 146  INR 1.32    Assessment and Plan: Chase Zhao is a 73 year old male with PMH including HTN, DM, depression, CAD, and hx of CVA and TIAs now s/p R TKA on 1/2 with Dr. Cruz. Post-operative course complicated by UE symptoms which subsequently resolved consistent with TIA - patient currently transferred to Neurology service for management of the aforementioned.     Activity: Up with assist until independent. Knee ROM as tolerated - increase CPM as tolerated with goal of 90 degrees of flexion.   Weight bearing status: WBAT.  Pain management: Transition from IV to PO as tolerated.    Antibiotics: Ancef x 24 hours - completed.  Diet: Begin with clear fluids and progress diet as tolerated.   DVT prophylaxis/Anticoagulation: Resume PTA Warfarin. Previously on heparin gtt d/t concern for TIA. Now bridging with Lovenox.   Imaging: No further imaging needed at this time.  Labs: Monitor INR.  Bracing/Splinting: None.   Dressings: Keep clean, dry and intact x 7 days.   Physical Therapy/Occupational Therapy: Eval and treat.  Follow-up: Clinic with Dr. Cruz in 2 weeks.   Disposition: Pending placement and medical clearance - okay to discharge to TCU from Orthopaedic Surgery perspective when this are established.     Dawna De La O, " MD  Orthopaedic Surgery Resident, PGY-4  Pager: [phone]

## 2019-01-06 NOTE — PROGRESS NOTES
[facility]  Neurology Progress Note  01/06/19    Patient Name: Chase Zhao    Subjective:  No overnight events. Pt has no new symptoms.    Objective:  B/P: 124/74, T: 97.8, P: 83, R: 14    GENERAL: NAD, lying on bed  HEENT: NC/AT. Mucous membranes moist.  CARDIAC: RRR without  Murmur.  RESPIRATORY: Good effort. CTAB. No w/r/r appreciated.  ABDOMINAL: Soft, non tender, non distended. + BS.   EXTREMITIES: Warm, dry.     NEUROLOGIC:  MS:   CN:    II - blind in left eye, decreased vision in right eye  III, IV, VI - EOMI, PERRL, no nystagmus noted, right eye ptosis (baseline)  V - facial sensation intact and equal   VII - facial movement symmetrical  VIII - hearing aids bilaterallyl  IX, X - uvula midline  XII - tongue midline with full ROM  MOTOR: normal tone throughout, no abnormal movements, strength 5/5 except RLE which cannot be assessed and LUE which is 4+ (baseline)  SENSORY: intact and symmetric to light touch throughout upper and lower extremities  COORDINATION/GAIT:  Not assessed    Imaging  No new imaging.    Pertinent Studies  LDL 34  A1C 6.7    Assessment & Plan:  Chase Zhao is a 73 year old male with history of mechanical AVR (on chronic warfarin), CAD, HTN, DM, CECILIA who presents s/p right TKA on 1/2/19 with left arm numbness and weakness. He is on warfarin and aspirin 81 at home, which were being held in the setting of surgery. Symptoms resolved within 30 mins. He describes similar episodes once a month for a few years. CTH/CTA head and neck revealed no LVO or acute hemorrhage. Pt was transferred for further work up of possible minor stroke vs TIA.     ACUTE PROBLEMS  #Transient Ischemic Attack vs. Minor Right hemispheric stroke  - ABCD2 Score: 5   - Admit to Neurology  - Neurochecks  - Euthermia, Euglycemia  - Continue PTA aspirin  - Currently being bridged with Lovenox to home Warfarin (see below)  - PTA Rosuvastatin 40 mg  - TTE (11/12/18): EF 50% to 55%, normal AVR  function, no thrombus, abnormal septal motion consistent with conduction delay, TTE pending for this admission  - Continuous Telemetry  - Bedside Glucose Monitoring  - A1c 6.7, LDL 34  - PT/OT/SLP  - Stroke Education  - Depression Screen  - Apnea Screen: already has CECILIA     CHRONIC PROBLEMS:  #s/p Rt TKA (1/2/19):  -Ortho consult  -Pain consult  -scheduled tylenol  -Dilaudid 0.2 mg q2h prn  -Oxycodone 2.5-5 mg q3h prn     #CAD w/ fixed apical defect   #Hx of mechanical AVR (2005)  -on chronic Warfarin, goal 2-3  -Echo 11/2018 revealed ejection fraction of 50% to 55% with a normal aortic valve replacement function.   -Held for Rt TKA 1/2/19 resumed 1/4 PM  -Hep gtt to bridge back to home warfarin, orthopedics ok with lovenox >40BMI bridge to wafarin and plan to discontinue heparin gtt 1/5  -Hold PTA Aspirin     #Depression  -PTA Celexa     #RLS  -PTA Clonazepam     #Paroxysmal Afib  -PTA Metoprolol 25 BID  -on chronic Warfarin; held for right TKA 1/2   -Started Hep gtt to bridge back to home warfarin  -lovenox bridge 1/5/2019 and discontinued hep gtt     #HTN  -PTA Metoprolol 25 BID  -Hydralazine PRN  -Labetalol PRN     #Type 2 DM  -Hold PTA Victoza  -sliding scale insulin medium dose     #CECILIA: on CPAP/biPAP at night     #GERD  -PTA Omeprazole     #Hx of glaucoma     #Bowel Regimen  -Senna BID   -Miralax PRN  -Dulcolax PRN     Fluids/Electrolytes/Nutrition: PO, Regular diet  DVT Prophylaxis: bridging with lovenox back to home warfarin  GI Prophylaxis: PPI  Code Status: Full Code  Disposition: TCU tomorrow, after TTE    Patient was seen and discussed with attending doctor, Dr. Pathak.    Doris Petersen MD  Neurology PGY1  Pager: [phone]

## 2019-01-06 NOTE — PLAN OF CARE
D/I: S/P R knee surgery; then had neuro change of left arm weakness and numbness - CT scan negative. MHx of frequent TIAs.  Neuro- A&Ox4, using call light appropriately, blind in left eye which is baseline from hx of glaucoma and decreased vision in right eye (baseline), bilateral hearing aides, LUE slightly weaker than right which is baseline, and baseline right ptosis, d  CV- AVSS, afebrile  Pulm- Sats mid 90's during day. bipap at night  GI-  regular diet, sliding scale insulin, last BM 1/3/19  - has urgency using urinal at bedside (needs to stand at bedside)   Gtts- hep gtt dc'd, started coumadin.   Skin- ace wrap to RLE with Aquacel dressing underneath. Bruised around knee.   Pain- Right knee pain with movement moderately controlled with prn oxy q3hr 5mg - needs to be offered otherwise pt forgets about it yet has pain.  P: Continue to bridge to Coumadin and then find placement at TCU - SW consult placed d/t wife having concerns with location of placement (they live far away). Needs CPM placed during the day. Will continue to monitor and follow with POC.

## 2019-01-06 NOTE — PROGRESS NOTES
01/06/19 1400   Quick Adds   Type of Visit PT Re-evaluation  (transferred to [facility] from [place] after stroke)   Living Environment   Lives With spouse   Living Arrangements house   Home Accessibility stairs to enter home   Living Environment Comment pt has 2 stairs to enter with L rail, does not need to perform stairs once inside   Self-Care   Usual Activity Tolerance moderate   Current Activity Tolerance fair   Equipment Currently Used at Home cane, straight;walker, rolling   Activity/Exercise/Self-Care Comment pt had been using SEC prior to admission for R TKA   Functional Level Prior   Ambulation 1-->assistive equipment   Transferring 1-->assistive equipment   Toileting 1-->assistive equipment   Bathing 1-->assistive equipment   Communication 0-->understands/communicates without difficulty   Swallowing 0-->swallows foods/liquids without difficulty   Cognition 0 - no cognition issues reported   Fall history within last six months yes   Number of times patient has fallen within last six months 4   Which of the above functional risks had a recent onset or change? ambulation;transferring   Prior Functional Level Comment pt with L blindness and [place], was using SEC prior to admission for R TKA and reports receiving some help for ADLs. high fall history. Wife states pt with shuffled gait at baseline. Pt had R TKA performed at [place] on 1/2/19 then with L sided 'stoke like deficits'  after ortho PT initiated TKA rehab and was transferred to [facility]. Pt reports significant history of these 'mini strokes'.   General Information   Onset of Illness/Injury or Date of Surgery - Date 01/02/19   Referring Physician Doris Petersen MD   Patient/Family Goals Statement none stated   Pertinent History of Current Problem (include personal factors and/or comorbidities that impact the POC) Chase Zhao is a 73 year old male with PMH including HTN, DM, depression, CAD, and hx of CVA and TIAs now s/p R TKA on 1/2 with   Anthony. Post-operative course complicated by UE symptoms which subsequently resolved consistent with TIA - patient currently transferred to Neurology service for management of the aforementioned.    Precautions/Limitations fall precautions   Weight-Bearing Status - LUE full weight-bearing   Weight-Bearing Status - RUE full weight-bearing   Weight-Bearing Status - LLE full weight-bearing   Weight-Bearing Status - RLE weight-bearing as tolerated   General Info Comments activity orders: up to chair   Cognitive Status Examination   Orientation orientation to person, place and time   Level of Consciousness alert   Follows Commands and Answers Questions 100% of the time   Personal Safety and Judgment intact   Memory intact   Pain Assessment   Patient Currently in Pain Yes, see Vital Sign flowsheet   Integumentary/Edema   Integumentary/Edema Comments R LE swelling consistent with TKA   Posture    Posture Forward head position;Protracted shoulders   Range of Motion (ROM)   ROM Comment R LE limited 2/2 to TKA, all other extremeties WFL   Strength   Strength Comments R LE NT due to pain, L LE grossly 4/5. Reduced  strength on L vs R.   Bed Mobility   Bed Mobility Comments supine<>sit with HOB elevated and rail use Umm   Transfer Skills   Transfer Comments pt stands to FWW with modA, needs cues for safety (tx)   Gait   Gait Comments ambulates in room x10' with FWW, very shuffled short steps and walker close to body, CGA.   Balance   Balance Comments pt needs B UE support to maintain safe standing. Good seated balance.    Sensory Examination   Sensory Perception no deficits were identified   General Therapy Interventions   Planned Therapy Interventions balance training;bed mobility training;gait training;neuromuscular re-education;ROM;strengthening;transfer training;home program guidelines;progressive activity/exercise   Clinical Impression   Criteria for Skilled Therapeutic Intervention yes, treatment indicated   PT  "Diagnosis imapired functional mobility   Influenced by the following impairments weakness, reduced activity tolerance, limited ROM, pain, vision   Functional limitations due to impairments bed mobility, transfers, gait, stairs, ADLs, falls risk   Clinical Presentation Stable/Uncomplicated   Clinical Presentation Rationale PMH, clinician impression   Clinical Decision Making (Complexity) Moderate complexity   Therapy Frequency` 5 times/week   Predicted Duration of Therapy Intervention (days/wks) 3-5 days   Anticipated Equipment Needs at Discharge (none if TCU)   Anticipated Discharge Disposition Transitional Care Facility   Risk & Benefits of therapy have been explained Yes   Patient, Family & other staff in agreement with plan of care Yes   Clinical Impression Comments eval complete, tx initiated   [facility] AM-PAC  \"6 Clicks\" V.2 Basic Mobility Inpatient Short Form   1. Turning from your back to your side while in a flat bed without using bedrails? 3 - A Little   2. Moving from lying on your back to sitting on the side of a flat bed without using bedrails? 3 - A Little   3. Moving to and from a bed to a chair (including a wheelchair)? 3 - A Little   4. Standing up from a chair using your arms (e.g., wheelchair, or bedside chair)? 3 - A Little   5. To walk in hospital room? 3 - A Little   6. Climbing 3-5 steps with a railing? 2 - A Lot   Basic Mobility Raw Score (Score out of 24.Lower scores equate to lower levels of function) 17   Total Evaluation Time   Total Evaluation Time (Minutes) 8     "

## 2019-01-06 NOTE — PLAN OF CARE
Discharge Planner PT   Patient plan for discharge: TCU  Current status: pt re-evaluated following transfer to [facility] following neurologic event. He is Umm bed mobility with HOB elevated and rail use, stands to FWW Umm and cues needed for safety and technique. Requires assist for urinal use standing EOB. He ambulates x100' with FWW CGA with gait cues throughout. Pt participates in supine TKA exercises to progress R LE deficits. Pt's neurologic deficits appear more UE than LE, mild L LE weakness noted grossly 4/5.  Barriers to return to prior living situation: medical needs, weakness, falls risk  Recommendations for discharge: TCU  Rationale for recommendations: Pt currently below baseline level of function and would benefit from ongoing therapy to address the above deficits in order to progress towards PLOF and promote IND mobility.       Entered by: Fiona Pulido 01/06/2019 2:58 PM

## 2019-01-07 ENCOUNTER — APPOINTMENT (OUTPATIENT)
Dept: CARDIOLOGY | Facility: CLINIC | Age: 74
DRG: 470 | End: 2019-01-07
Attending: ORTHOPAEDIC SURGERY
Payer: MEDICARE

## 2019-01-07 ENCOUNTER — DOCUMENTATION ONLY (OUTPATIENT)
Dept: ANTICOAGULATION | Facility: CLINIC | Age: 74
End: 2019-01-07

## 2019-01-07 VITALS
HEART RATE: 83 BPM | TEMPERATURE: 96.2 F | DIASTOLIC BLOOD PRESSURE: 63 MMHG | OXYGEN SATURATION: 93 % | HEIGHT: 68 IN | SYSTOLIC BLOOD PRESSURE: 127 MMHG | RESPIRATION RATE: 16 BRPM | WEIGHT: 224.21 LBS | BODY MASS INDEX: 33.98 KG/M2

## 2019-01-07 LAB
GLUCOSE BLDC GLUCOMTR-MCNC: 288 MG/DL (ref 70–99)
GLUCOSE BLDC GLUCOMTR-MCNC: 360 MG/DL (ref 70–99)
GLUCOSE BLDC GLUCOMTR-MCNC: 406 MG/DL (ref 70–99)
INR PPP: 1.46 (ref 0.86–1.14)

## 2019-01-07 PROCEDURE — 00000146 ZZHCL STATISTIC GLUCOSE BY METER IP

## 2019-01-07 PROCEDURE — 93306 TTE W/DOPPLER COMPLETE: CPT | Mod: 26 | Performed by: INTERNAL MEDICINE

## 2019-01-07 PROCEDURE — 25000128 H RX IP 250 OP 636: Performed by: PSYCHIATRY & NEUROLOGY

## 2019-01-07 PROCEDURE — 25000132 ZZH RX MED GY IP 250 OP 250 PS 637: Mod: GY | Performed by: STUDENT IN AN ORGANIZED HEALTH CARE EDUCATION/TRAINING PROGRAM

## 2019-01-07 PROCEDURE — A9270 NON-COVERED ITEM OR SERVICE: HCPCS | Mod: GY | Performed by: PSYCHIATRY & NEUROLOGY

## 2019-01-07 PROCEDURE — 25500064 ZZH RX 255 OP 636: Performed by: INTERNAL MEDICINE

## 2019-01-07 PROCEDURE — 85610 PROTHROMBIN TIME: CPT | Performed by: STUDENT IN AN ORGANIZED HEALTH CARE EDUCATION/TRAINING PROGRAM

## 2019-01-07 PROCEDURE — 36415 COLL VENOUS BLD VENIPUNCTURE: CPT | Performed by: STUDENT IN AN ORGANIZED HEALTH CARE EDUCATION/TRAINING PROGRAM

## 2019-01-07 PROCEDURE — 25000132 ZZH RX MED GY IP 250 OP 250 PS 637: Mod: GY | Performed by: PSYCHIATRY & NEUROLOGY

## 2019-01-07 PROCEDURE — 40000264 ECHOCARDIOGRAM COMPLETE

## 2019-01-07 PROCEDURE — 99207 ZZC NO CHARGE COORDINATED CARE PS: CPT | Performed by: PHYSICIAN ASSISTANT

## 2019-01-07 PROCEDURE — A9270 NON-COVERED ITEM OR SERVICE: HCPCS | Mod: GY | Performed by: STUDENT IN AN ORGANIZED HEALTH CARE EDUCATION/TRAINING PROGRAM

## 2019-01-07 RX ORDER — METOPROLOL TARTRATE 25 MG/1
25 TABLET, FILM COATED ORAL 2 TIMES DAILY
DISCHARGE
Start: 2019-01-07 | End: 2019-02-06

## 2019-01-07 RX ORDER — KETOCONAZOLE 20 MG/G
CREAM TOPICAL DAILY
DISCHARGE
Start: 2019-01-07 | End: 2019-02-06

## 2019-01-07 RX ORDER — ACYCLOVIR 200 MG/1
10 CAPSULE ORAL ONCE
Status: DISCONTINUED | OUTPATIENT
Start: 2019-01-07 | End: 2019-01-07 | Stop reason: HOSPADM

## 2019-01-07 RX ORDER — WARFARIN SODIUM 5 MG/1
5-7.5 TABLET ORAL DAILY
DISCHARGE
Start: 2019-01-07 | End: 2019-02-06

## 2019-01-07 RX ORDER — WARFARIN SODIUM 10 MG/1
10 TABLET ORAL
Status: DISCONTINUED | OUTPATIENT
Start: 2019-01-07 | End: 2019-01-07 | Stop reason: HOSPADM

## 2019-01-07 RX ORDER — WARFARIN SODIUM 10 MG/1
10 TABLET ORAL DAILY
Qty: 30 TABLET | Refills: 0 | DISCHARGE
Start: 2019-01-07 | End: 2019-02-06

## 2019-01-07 RX ORDER — LISINOPRIL 5 MG/1
5 TABLET ORAL DAILY
Qty: 30 TABLET | Refills: 0 | DISCHARGE
Start: 2019-01-07 | End: 2019-02-06

## 2019-01-07 RX ORDER — NITROGLYCERIN 0.4 MG/1
0.4 TABLET SUBLINGUAL EVERY 5 MIN PRN
DISCHARGE
Start: 2019-01-07 | End: 2019-02-06

## 2019-01-07 RX ORDER — LIRAGLUTIDE 6 MG/ML
1.8 INJECTION SUBCUTANEOUS DAILY
DISCHARGE
Start: 2019-01-07 | End: 2019-02-06

## 2019-01-07 RX ORDER — ACETAMINOPHEN 325 MG/1
650 TABLET ORAL EVERY 4 HOURS PRN
DISCHARGE
Start: 2019-01-07 | End: 2019-02-06

## 2019-01-07 RX ORDER — HALOBETASOL PROPIONATE 0.05 %
OINTMENT (GRAM) TOPICAL DAILY
DISCHARGE
Start: 2019-01-07 | End: 2019-02-06

## 2019-01-07 RX ORDER — ASPIRIN 81 MG/1
81 TABLET, CHEWABLE ORAL DAILY
DISCHARGE
Start: 2019-01-07 | End: 2019-02-06

## 2019-01-07 RX ORDER — OXYCODONE HYDROCHLORIDE 5 MG/1
2.5-5 TABLET ORAL
Status: SHIPPED | DISCHARGE
Start: 2019-01-07 | End: 2019-02-06

## 2019-01-07 RX ORDER — CITALOPRAM HYDROBROMIDE 40 MG/1
40 TABLET ORAL DAILY
DISCHARGE
Start: 2019-01-07 | End: 2020-02-26

## 2019-01-07 RX ORDER — CLONAZEPAM 1 MG/1
1 TABLET ORAL AT BEDTIME
Qty: 30 TABLET | Refills: 0 | Status: SHIPPED | DISCHARGE
Start: 2019-01-07 | End: 2020-02-26

## 2019-01-07 RX ORDER — CYCLOSPORINE 0.5 MG/ML
1 EMULSION OPHTHALMIC 2 TIMES DAILY
DISCHARGE
Start: 2019-01-07

## 2019-01-07 RX ORDER — FLUTICASONE PROPIONATE 0.05 %
CREAM (GRAM) TOPICAL DAILY
DISCHARGE
Start: 2019-01-07 | End: 2019-02-06

## 2019-01-07 RX ORDER — CHLORAL HYDRATE 500 MG
2 CAPSULE ORAL DAILY
Qty: 60 CAPSULE | Refills: 0 | DISCHARGE
Start: 2019-01-07 | End: 2019-02-06

## 2019-01-07 RX ORDER — ROSUVASTATIN CALCIUM 40 MG/1
40 TABLET, COATED ORAL AT BEDTIME
Qty: 30 TABLET | Refills: 0 | DISCHARGE
Start: 2019-01-07 | End: 2019-02-06

## 2019-01-07 RX ADMIN — METOPROLOL TARTRATE 25 MG: 25 TABLET, FILM COATED ORAL at 09:26

## 2019-01-07 RX ADMIN — Medication 5 MG: at 09:36

## 2019-01-07 RX ADMIN — INSULIN ASPART 5 UNITS: 100 INJECTION, SOLUTION INTRAVENOUS; SUBCUTANEOUS at 09:27

## 2019-01-07 RX ADMIN — Medication 5 MG: at 12:56

## 2019-01-07 RX ADMIN — HUMAN ALBUMIN MICROSPHERES AND PERFLUTREN 6 ML: 10; .22 INJECTION, SOLUTION INTRAVENOUS at 11:30

## 2019-01-07 RX ADMIN — INSULIN ASPART 6 UNITS: 100 INJECTION, SOLUTION INTRAVENOUS; SUBCUTANEOUS at 12:22

## 2019-01-07 RX ADMIN — OMEPRAZOLE 20 MG: 20 CAPSULE, DELAYED RELEASE ORAL at 09:25

## 2019-01-07 RX ADMIN — ACETAMINOPHEN 650 MG: 325 TABLET, FILM COATED ORAL at 00:35

## 2019-01-07 RX ADMIN — ENOXAPARIN SODIUM 80 MG: 80 INJECTION SUBCUTANEOUS at 09:27

## 2019-01-07 RX ADMIN — BISACODYL 10 MG: 10 SUPPOSITORY RECTAL at 11:47

## 2019-01-07 RX ADMIN — Medication 5 MG: at 00:20

## 2019-01-07 RX ADMIN — ASPIRIN 81 MG: 81 TABLET, COATED ORAL at 09:25

## 2019-01-07 RX ADMIN — SENNOSIDES AND DOCUSATE SODIUM 1 TABLET: 8.6; 5 TABLET ORAL at 09:26

## 2019-01-07 RX ADMIN — CITALOPRAM HYDROBROMIDE 40 MG: 40 TABLET ORAL at 09:25

## 2019-01-07 ASSESSMENT — ACTIVITIES OF DAILY LIVING (ADL)
ADLS_ACUITY_SCORE: 22

## 2019-01-07 NOTE — PROGRESS NOTES
Social Work Services Discharge Note      Patient Name:  Chase Zhao     Anticipated Discharge Date:  1/7/19    Discharge Disposition:   [facility] ([phone])    Following MD:  Facility Assignment     Pre-Admission Screening (PAS) online form has been completed.  The Level of Care (LOC) is:  Determined  Confirmation Code is:  [id number].  Patient/caregiver informed of referral to [facility] for Pre-Admission Screening for skilled nursing facility (SNF) placement and to expect a phone call post discharge from SNF.     Additional Services/Equipment Arranged:  Dr. Peetrsen confirmed readiness for discharge.  Admissions at [facility] ([place]) confirmed acceptance for admission.  BRIGIDA spoke with pt's floor nurse (Nori) who indicates that pt can be transported by w/c.  SW arranged for [employer] (Carleen [phone]) to provide private pay w/c transport at 1:30pm.       Patient / Family response to discharge plan:  Pt and wife voice understanding of the discharge plan and agreement with the discharge plan.     Persons notified of above discharge plan:  Pt, wife, 6A nursing, Dr. Petersen and Dr. Cruz.    Staff Discharge Instructions:  Please fax discharge orders and signed hard scripts for any controlled substances (SW will complete this task).  Please print a packet and send with patient.     CTS Handoff completed:  YES    Medicare Notice of Rights provided to the patient/family:  YES    MANFRED Mccarty  Social Work, 6A  Phone:  [phone]  Pager:  [phone]  1/7/2019

## 2019-01-07 NOTE — PLAN OF CARE
Status: Patient here S/P R knee surgery; then had neuro change of left arm weakness and numbness - CT scan was negative. Hx of frequent TIAs.  Neuro:  A&Ox4, using call light appropriately. Pt is blind L eye, hx of glaucoma, decreased vision in R eye. [place], hearing aids in place. LUE 4/5, RLE 4/5. No HA or N/t.   VSS: Stable. BIPAP at night.   GI: Regular diet, sliding scale insulin  : Bedside urinal, able to use laying in bed.    IV: PIV SL.   Skin: Ace wrap to RLE with Aquacel dressing underneath per note. Taran able redness on coccyx - frequent position changing encouraged.   Pain- Right knee pain controlled with PRN tylenol and oxy x1 overnight.   P: Discharge awaiting placement at TCU - SW is in the process. Still needs bubble study. Will continue to monitor and follow with POC.

## 2019-01-07 NOTE — PLAN OF CARE
6A: Patient discharge prior to PT session. PT will complete orders at this time.    Physical Therapy Discharge Summary    Reason for therapy discharge:    Discharged to transitional care facility.    Progress towards therapy goal(s). See goals on Care Plan in [facility] electronic health record for goal details.  Goals partially met.  Barriers to achieving goals:   discharge from facility.    Therapy recommendation(s):    Continued therapy is recommended.  Rationale/Recommendations:  Progress gait, transfers, bed mobility,strength and balance.

## 2019-01-07 NOTE — DISCHARGE SUMMARY
[facility], [place]    Neurology Stroke Discharge Summary    Date of Admission: 1/2/2019  Date of Discharge: 01/07/2019    Disposition: Discharged to rehabilitation facility  Primary Care Physician: Madhu Miller      Admission Diagnosis:   Transient Ischemic Attack    Discharge Diagnosis:   Transient Ischemic Attack    Problem Leading to Hospitalization (from HPI):   Chase Zhao is a 73 year old male with history of mechanical AVR (on chronic warfarin), CAD, HTN, DM, CECILIA who presents as transfer from [facility] s/p right TKA on 1/2/19 with left arm numbness and weakness.    Please see H&P dated 1/2/2019 for further details about presentation.    Brief Hospital Course:   Patient presented with left arm numbness and weakness s/p right TKA, which fully resolved within 30 minutes. He has a history of mechanical AVR, on chronic warfarin which was being held in the setting of surgery. Pt reported multiple similar episodes in the past, all of which resolved in 30 minutes. CTH/CTA head and neck revealed no LVO or acute hemorrhage.    IV tPA was not given as pt's symptoms resolved.     Work-up as stated below under Pertinent Investigations.    Rehab evaluation: OT and PT.     Smoking Cessation: patient is not a smoker    BP Long-term Goal: 140/90 or less    Antithrombotic/Anticoagulant Agent: warfarin with goal INR 2-3.  Follow-up: Continuing on warfarin and NO medication changes or warfarin dose changes - schedule within one week for Anticoagulation clinic    Statins: Prior to admission Rosuvastation continued.       Hgb A1C Goal: < 7.0    Complications: None.     Other problems addressed during this hospitalization:  #s/p Rt TKA (1/2/19): Follow up with orthopedic clinic as indicated by the team (see below in discharge instructions). Per ortho, can continue with Oxycodone PRN for knee pain.     #CAD w/ fixed apical defect   #Hx of mechanical AVR (2005)  Echo on admit showed no acute  changes with no clots. Pt is on chronic Warfarin, goal INR of 2-3. Warfarin and PTA Aspirin were held for Rt TKA on 1/2/19. Pt is now being bridged with Lovenox 80 BID to Warfarin.  Current INR on 1/7/18 is 1.46. Continue with PTA aspirin. Follow up INR in 1-2 days for further dosing. Once discharged from TCU, can continue to follow up with INR clinic as he did prior to admission.    #Depression: Continue PTA Celexa     #RLS: Continue PTA Clonazepam     #Paroxysmal Afib: Continue PTA Metoprolol 25 BID.     #HTN: Continue PTA Lisinopril 5 mg and Metoprolol 25 BID     #Type 2 DM: Continue PTA Victoza & Lispro 70/30 (24 units in the morning and 26 units in the evening).     #CECILIA: on CPAP     #GERD: Continue PTA Omeprazole    PERTINENT INVESTIGATIONS    Labs  Lipid Panel:   Recent Labs   Lab Test 01/05/19  0628   CHOL 103   HDL 32*   LDL 34   TRIG 188*     A1C:   Lab Results   Component Value Date    A1C 6.7 01/04/2019     INR:   Recent Labs   Lab 01/07/19  0634 01/06/19  0529 01/05/19  0628   INR 1.46* 1.32* 1.23*      Coag Panel / Hypercoag Workup: Not indicated  Pending test results: None    Echo:   No cardiac source for embolus identified. The atrial septum is intact as  assessed by color Doppler and agitated saline bubble study .  _____________________________________________________________________________  __        Left Ventricle  Left ventricular wall thickness is normal. Left ventricular size is normal.  Global and regional left ventricular function is normal with an EF of 55-60%.  The Ejection Fraction was visually estimated. Left ventricular diastolic  function is indeterminate. No regional wall motion abnormalities are seen.     Right Ventricle  The right ventricle is normal size. Global right ventricular function is  normal.     Atria  Both atria appear normal. The atrial septum is intact as assessed by color  Doppler and agitated saline bubble study .        Mitral Valve  Trace mitral insufficiency is  present.     Aortic Valve  The aortic valve area is 1.38 cm^2, by the continuity equation. The peak  aortic velocity is 2.9 m/sec. The mean gradient across the aortic valve is18  mmHg. A bioprosthetic aortic valve is present. Doppler interrogation of the  aortic valve is normal.     Tricuspid Valve  The tricuspid valve cannot be assessed. The peak velocity of the tricuspid  regurgitant jet is not obtainable.     Pulmonic Valve  The pulmonic valve cannot be assessed.     Vessels  Visualized portions of the aorta are normal in size aortic root measures  3.4cm. The inferior vena cava was normal in size with preserved respiratory  variability. The pulmonary artery and bifurcation cannot be assessed.  Estimated mean right atrial pressure is 3 mmHg (normal).     Pericardium  No pericardial effusion is present. Prominent epicardial fat is noted.        Compared to Previous Study  Previous study not available for comparison.    Imaging:   CTH/CTA Head & Neck (1/2/19):  1. Head CTA demonstrates no aneurysm or stenosis of the major  intracranial arteries.   2. Neck CTA demonstrates no stenosis of the major cervical arteries.   3. No evidence of intracranial hemorrhage on the noncontrast head CT.  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    Endovascular procedure: None     Cardiac Monitoring: Patient had > 24 hrs of cardiac monitor while in hospital.    Findings: Patient has history of atrial fibrillation and is on chronic Warfarin.     Sleep Apnea Screen: Pt already has CECILIA, on CPAP.    PHQ-9 Depression Screen Score: Pt already diagnosed with depression, on medication.    Education discussed with: patient and family on follow-up recommendations/plan.    During daily rounds, the plan of care was discussed and developed with patient and family.  Plan of care includes: continuing with long term anticoagulation.    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  Sensory: Sensation intact to light touch throughout upper and lower extremities  Coordination: Unable to assess  Gait: Up with assistance      National Institutes of Health Stroke Scale (on day of discharge)  NIHSS Total Score: 0    Modified Pravin Scale (on day of discharge): 0-No symptoms    Medications    Current Discharge Medication List      START taking these medications    Details   oxyCODONE (ROXICODONE) 5 MG tablet 0.5-1 tablets (2.5-5 mg) by Oral or Feeding Tube route every 3 hours as needed for other (pain control or improvement in physical function. Hold dose for analgesic side effects.)    Comments: For knee pain after surgery  Associated Diagnoses: Status post knee surgery; TIA (transient ischemic attack)         CONTINUE these medications which have CHANGED    Details   acetaminophen (TYLENOL) 325 MG tablet 2 tablets (650 mg) by Oral or Feeding Tube route every 4 hours as needed for other (multimodal surgical pain management along with NSAIDS and opioid medication as indicated based on pain control and physical function.)    Comments: For knee pain  Associated Diagnoses: Status  post knee surgery; TIA (transient ischemic attack)      aspirin (ASA) 81 MG chewable tablet Take 1 tablet (81 mg) by mouth daily    Comments: For CAD  Associated Diagnoses: Status post knee surgery; TIA (transient ischemic attack)      Cholecalciferol (VITAMIN D3) 1000 units CAPS Take 1 capsule by mouth daily  Qty: 30 capsule    Comments: Nutritional supplement  Associated Diagnoses: Status post knee surgery; TIA (transient ischemic attack)      citalopram (CELEXA) 40 MG tablet Take 1 tablet (40 mg) by mouth daily    Comments: For depression  Associated Diagnoses: Status post knee surgery; TIA (transient ischemic attack)      clonazePAM (KLONOPIN) 1 MG tablet Take 1 tablet (1 mg) by mouth At Bedtime  Qty: 30 tablet, Refills: 0    Associated Diagnoses: Status post knee surgery; TIA (transient ischemic attack)      cycloSPORINE (RESTASIS) 0.05 % ophthalmic emulsion Place 1 drop into both eyes 2 times daily    Comments: For dry eyes  Associated Diagnoses: Status post knee surgery; TIA (transient ischemic attack)      enoxaparin (LOVENOX) 80 MG/0.8ML syringe Inject 0.8 mLs (80 mg) Subcutaneous every 12 hours for 5 days  Qty: 8 mL, Refills: 0    Comments: For bridging to warfarin INR 2-3  Associated Diagnoses: Status post knee surgery; TIA (transient ischemic attack)      fish oil-omega-3 fatty acids 1000 MG capsule Take 2 capsules (2 g) by mouth daily Take 2 g by mouth daily  Qty: 60 capsule, Refills: 0    Comments: For nutritional supplement  Associated Diagnoses: Status post knee surgery; TIA (transient ischemic attack)      fluticasone (CUTIVATE) 0.05 % external cream Apply topically daily    Comments: For skin condition  Associated Diagnoses: Status post knee surgery; TIA (transient ischemic attack)      halobetasol (ULTRAVATE) 0.05 % ointment Apply topically daily    Comments: For skin condition  Associated Diagnoses: Status post knee surgery; TIA (transient ischemic attack)      JANTOVEN 5 MG tablet Take 1-1.5  tablets (5-7.5 mg) by mouth daily 1 to 1.5 tablets daily    Comments: For mechanical heart valve, stroke prevention. To be dosed for INR 2-3, being bridged with lovenox  Associated Diagnoses: Status post knee surgery; TIA (transient ischemic attack)      ketoconazole (NIZORAL) 2 % external cream Apply topically daily    Comments: For skin condition  Associated Diagnoses: Status post knee surgery; TIA (transient ischemic attack)      liraglutide (VICTOZA PEN) 18 MG/3ML solution Inject 1.8 mg Subcutaneous daily    Comments: For diabetes  Associated Diagnoses: Status post knee surgery; TIA (transient ischemic attack)      lisinopril (PRINIVIL/ZESTRIL) 5 MG tablet Take 1 tablet (5 mg) by mouth daily  Qty: 30 tablet, Refills: 0    Comments: For HTN  Associated Diagnoses: Status post knee surgery; TIA (transient ischemic attack)      metoprolol tartrate (LOPRESSOR) 25 MG tablet Take 1 tablet (25 mg) by mouth 2 times daily    Comments: For atrial fibrillation  Associated Diagnoses: Status post knee surgery; TIA (transient ischemic attack)      nitroGLYcerin (NITROSTAT) 0.4 MG sublingual tablet Place 1 tablet (0.4 mg) under the tongue every 5 minutes as needed    Comments: For chest discomfort due to CAD  Associated Diagnoses: Status post knee surgery; TIA (transient ischemic attack)      omeprazole (PRILOSEC) 20 MG DR capsule Take 1 capsule (20 mg) by mouth daily    Comments: For GERD  Associated Diagnoses: Status post knee surgery; TIA (transient ischemic attack)      rosuvastatin (CRESTOR) 40 MG tablet Take 1 tablet (40 mg) by mouth At Bedtime  Qty: 30 tablet, Refills: 0    Comments: For high cholesterol  Associated Diagnoses: Status post knee surgery; TIA (transient ischemic attack)         CONTINUE these medications which have NOT CHANGED    Details   blood glucose monitoring ([employer] TEST STRIPS) test strip Dispense test strips covered by the patient insurance. Test 4 times per day, before meals and at bedtime.     "  Blood Glucose Monitoring Suppl (FIFTY50 GLUCOSE METER 2.0) w/Device KIT Dispense meter, test strips, lancets covered by pt ins. E11.65 IDDM type II, uncontrolled - Test 6 times/day. Reason: Unstable diabetes      Insulin Lispro (HUMALOG PEN SC) 70/30 flex pen  Take 24 units in the morning and 26 units in the evening.      Insulin Syringe 30G X 5/16\" 1 ML MISC Dispense item covered by patient insurance. For administering insulin at home.      NOVOFINE 30G X 8 MM insulin pen needle              Additional recommendations and follow up:       Reason for your hospital stay    You were admitted to the hospital following your total knee arthroplasty.     Adult [facility] Follow-up and recommended labs and tests    You are to return to clinic in 2 weeks for wound check with the clinic nurse. Approximately 6 weeks after your surgery, you will be scheduled for an appointment with Dr. Cruz. At this time, AP and lateral knee imaging will be obtained.    If you need to schedule your 2 and 6 week postoperative visits or haven't received confirmation regarding these visits, please call one of the following numbers within 7 days of discharge.    For patients that see Dr. Cruz at:   -[facility]: [phone]  -[facility]: [phone]  -[facility]: [phone]     Discharge Instructions    TOTAL KNEE ARTHROPLASTY POST OPERATIVE DISCHARGE INSTRUCTIONS    FOLLOW UP APPOINTMENT  You are scheduled for a post operative wound check with Dr. Cruz s nurse approximately two weeks after surgery. At approximately six weeks after surgery, you will see Dr. Cruz in clinic. During this visit, repeat X rays of your operative hip will be performed.      Your follow up appointments will be at the location that you regularly see Dr. Cruz:    [facility]  [address] [place] [id number] [phone]    [facility]" [address] [id number] [phone]    [facility]  [address] [place] [address]  [phone]    Physical therapy:   A prescription for physical therapy will be provided at the time of discharge.       ACTIVITY  Weight bearing status:   You may bear weight on your operative extremity as tolerated, using assistive devices (walker, cane) as needed. As you begin to feel more comfortable ambulating, you may gradually transition from a walker to a cane. Eventually, you should wean from all assistive devices. Although we would like you to discontinue use of assistive devices as soon as possible, do not transition until you have worked with your physical therapist to achieve safe balance and comfort.     Continuous passive motion machine:   Perform CPM exercises for six to eight hours per day for the first four weeks after surgery. The CPM should be set at 0 degrees to flexion tolerance with a goal of 90 degrees. Advance the CPM settings aggressively in increments of 5 degrees every 30 minutes until goal is achieved.     Exercises:   Perform the following exercises at least three times per day for the first four weeks after surgery to prevent complications, such as blood clots in your legs:  1) Point and flex your feet  2) Move your ankle around in big circles  3) Wiggle your toes   Also, perform thigh muscle tightening exercises for 10 to 15 minutes at least three times per day for the first four weeks after surgery.    Athletic Activities:  Activities such as swimming, bicycling, jogging, running, and stop-and-go sports should be avoided until permitted by your provider.    Driving:  Driving is not permitted until directed by your provider. Under no circumstance are you permitted to drive while using narcotic pain medications.    Return to Work:  You may return to work when directed by your provider.       COMFORT AND PAIN  MANAGEMENT  Elevation:   During times of inactivity throughout the first two weeks after surgery, make an effort to decrease swelling by elevating your operative extremity. This is most effectively done by lying down and placing several pillows lengthwise under your thigh and calf to raise your toes above the level of your nose. To ensure that you knee remains in full extension, do not place pillows directly under your knee.     Icing:  An ice pack will be provided to control swelling and discomfort after surgery. Place a thin towel on your skin and apply the ice pack overtop. You may apply ice for 20 minutes as often as two times per hour.    Pain Medications:  You will be discharged with acetaminophen (Tylenol) and a narcotic medication for pain management after surgery. Acetaminophen can help to effectively manage pain when used as prescribed, however, do not exceed the maximum daily dose of 3000 mg. The narcotic pain medication should be reserved for severe, breakthrough pain. Take the narcotic medication as prescribed and use only as often as necessary.       ANTICOAGULATION  Take enoxaparin (Lovenox) as prescribed for a total of two weeks after surgery. After you complete your enoxaparin regimen, take one aspirin 325mg daily for the next two weeks.       WOUND CARE AND SHOWERING  Wound care:  You have a clean Aquacel dressing on your surgical wound. This dressing should stay in place for seven days to allow the incision to heal. After seven days, you may change the dressing. Please use sterile 4x4 gauze dressings with tape over top to secure the dressing. If the Aquacel dressing becomes wet or saturated with wound drainage, it is appropriate to change the dressing before seven days. If drainage persists from the incision site to the extent that it is frequently saturating the dressing, please contact Dr. Cruz s clinic.    Showering:  You may shower 48 hours after surgery, provided the Aquacel dressing is in  place. You may allow water to run over the dressing, but do not to soak or submerge your wound underwater. The steri-strips (small white tape that is directly on the incision areas) should be left on until they fall off or are removed at your first office visit.    Tub Bathing:  Tub bathing, swimming, or any other activities that cause your incision to be submerged should be avoided until allowed by your provider. Typically, patients are allowed to return to these activities four weeks after surgery.      CONTACTING YOUR PHYSICIAN:  You may experience symptoms that require follow-up before your scheduled two week appointment. Please contact Dr. Cruz's office if you experience:  1) Pain in your knee that persists or worsens in the first few days after surgery  2) Excessive redness or drainage of cloudy or bloody material from the wounds (Clear red tinted fluid and some mild drainage should be expected) or drainage of any kind five days after surgery  3) A temperature elevation greater than 101.5    4) Pain, swelling or redness in your calf  5) Numbness or weakness in your leg or foot      Regular business hours (Monday - Friday, 8am - 5pm):  [facility]: [phone]  [facility]: [phone]  [facility]: [phone]    After hours and weekends:  [facility] on call Orthopedic resident: [phone]     Activity    Your activity upon discharge will be as tolerated. Continue to perform knee ROM exercises, focusing on hyperextension to 90 degrees (or greater) of knee flexion.     General info for SNF    Length of Stay Estimate: Short Term Care: Estimated # of Days 31-90  Condition at Discharge: Improving  Level of care:skilled   Rehabilitation Potential: Excellent  Admission H&P remains valid and up-to-date: Yes  Recent Chemotherapy: N/A  Use Nursing Home Standing Orders: Yes     Mantoux instructions     "Give two-step Mantoux (PPD) Per Facility Policy Yes     Reason for your hospital stay    You were admitted under the care of the Stroke Neurology team when you developed left arm numbness/weakness, which was concerning for a possible stroke. Because your symptoms resolved shortly after, it is likely you suffered a minor stroke, called a \"Transient Ischemic Attack\". When you underwent your surgery, it was important to hold your blood thinners to prevent excess bleeding, which could put you at risk for clotting. As such, it was important to resume your Warfarin to prevent clots from forming and causing subsequent strokes. No new medications were added as your are already on optimal medical management. Please continue with your ongoing home medications.     Follow Up and recommended labs and tests    Follow up with your primary care provider in 1 week after your discharge for follow up of your recent hospitalization.    Follow up with nursing home physician for management of warfarin. The following labs are recommended: INR check in 1-2 days.  Follow up with orthopedic team as instructed.  Follow-up with Stroke Clinic in  4-6 weeks.   You will be contacted to schedule a Stroke Clinic appointment. If you have not been contacted to schedule a stroke follow-up appointment within 7 days of discharge, please contact Stroke Neurology Clinic at [phone], pick option #1.   If you have other stroke related questions, please call [phone], pick option #3, and ask to speak to Cordell Fried RN Stroke/Endovascular Care Coordinator.  If you have any urgent stroke related questions after hours, please contact the hospital  at [phone] and ask to be connected to a stroke provide     Activity - Up ad jag     Full Code     Physical Therapy Adult Consult    Evaluate and treat as clinically indicated.    Reason: stroke, right total knee arthroplasty     Occupational Therapy Adult Consult    Evaluate and treat as " clinically indicated.    Reason: minor stroke/TIA, right total knee arthroplasty     Speech Language Path Adult Consult    Evaluate and treat as clinically indicated.    Reason: minor stroke/TIA     Fall precautions     Diet    You may return to your regular, pre-surgery diet unless instructed otherwise by your provider.     Advance Diet as Tolerated    Follow this diet upon discharge: Orders Placed This Encounter      Diet      Regular Diet Adult       Patient was seen and discussed with the Attending, Dr. Platt.    Doris Petersen MD  Neurology PGY1  Pager: [phone]

## 2019-01-07 NOTE — PLAN OF CARE
Occupational Therapy Discharge Summary    Reason for therapy discharge:    Discharged to transitional care facility.    Progress towards therapy goal(s). See goals on Care Plan in [facility] electronic health record for goal details.  Goals partially met.  Barriers to achieving goals:   discharge from facility.    Therapy recommendation(s):    Continued therapy is recommended.  Rationale/Recommendations:  Pt would benefit from continued to therapy as pt below functional baseline for ADLs/IADLs, transfers, and mobility impacting safety in order to return to PLOF.

## 2019-01-07 NOTE — PLAN OF CARE
AVSS, pain of right knee in control on oral med. Tolerating regular diet, had large BM today. Tolerates activity to bathroom, RLE ace bandage clean and intact, no drainage. Voiding urgency, but adequate volumes. R knee in CPM at 45 degree max from about 0900 til noon, of for discharge to TCU. ECHO done in room. Stroke nurse saw patient and wife , education done. Accepted to TCU near home, left with w/c transport, wife assisted to confirm has all personal belongings, including hearing aide, glasses, BIPAP machine and CPM machine. Received oral pain med before leaving.

## 2019-01-08 ENCOUNTER — RECORDS - HEALTHEAST (OUTPATIENT)
Dept: LAB | Facility: CLINIC | Age: 74
End: 2019-01-08

## 2019-01-08 LAB — INR PPP: 1.7 (ref 0.9–1.1)

## 2019-01-09 ENCOUNTER — RECORDS - HEALTHEAST (OUTPATIENT)
Dept: LAB | Facility: CLINIC | Age: 74
End: 2019-01-09

## 2019-01-09 LAB — INR PPP: 1.8 (ref 0.9–1.1)

## 2019-01-10 ENCOUNTER — RECORDS - HEALTHEAST (OUTPATIENT)
Dept: LAB | Facility: CLINIC | Age: 74
End: 2019-01-10

## 2019-01-10 LAB — INR PPP: 2.09 (ref 0.9–1.1)

## 2019-01-11 ENCOUNTER — RECORDS - HEALTHEAST (OUTPATIENT)
Dept: LAB | Facility: CLINIC | Age: 74
End: 2019-01-11

## 2019-01-11 LAB — INR PPP: 2 (ref 0.9–1.1)

## 2019-01-14 ENCOUNTER — RECORDS - HEALTHEAST (OUTPATIENT)
Dept: LAB | Facility: CLINIC | Age: 74
End: 2019-01-14

## 2019-01-14 LAB — INR PPP: 2.81 (ref 0.9–1.1)

## 2019-01-15 ENCOUNTER — ALLIED HEALTH/NURSE VISIT (OUTPATIENT)
Dept: NURSING | Facility: CLINIC | Age: 74
End: 2019-01-15
Payer: MEDICARE

## 2019-01-15 DIAGNOSIS — Z51.89 VISIT FOR WOUND CHECK: ICD-10-CM

## 2019-01-15 DIAGNOSIS — Z48.02 VISIT FOR SUTURE REMOVAL: Primary | ICD-10-CM

## 2019-01-15 PROCEDURE — 99024 POSTOP FOLLOW-UP VISIT: CPT

## 2019-01-15 NOTE — NURSING NOTE
Chase Zhao comes into clinic today at the request of Dr. Cruz for suture removal and wound check.  Patient presents in wheelchair, he is at [facility] where he states he is walking the halls with a walker, but his wife has yet to walk with him.  He presents with his wife.  The patient is status post RTKA on 1/2/19.     Incision clean, dry and intact. Cut bilateral suture ends to incision.  Steri-strips removed. Incision cleaned and new steri-strips applied. Pt advised to let steri-strips fall off on their own. Pt instructed on wound care and symptoms of infection to watch for.     Discussed anticoagulation. Pt is currently taking home regimen of Coumadin. Pt advised against any NSAIDs while on any anticoagulation med (ASA, Lovenox,or Coumadin).      Discussed current pain medication regime. Pt currently taking Oxycodone once per day, patient also complaining of having a lot of pain.  He states he doesn't ask for the pain medications because he can't remember when he last had them.  Instructed patient to ask for them 2 times per day to see if this helps his pain     Discussed current physical therapy program. Pt is rehab center 2 times per day    Total time spent with Pt: 50 minutes.    Jenn Alexandra RN

## 2019-01-15 NOTE — PATIENT INSTRUCTIONS
"Cover incision if draining with gauze. If steri strips (tapes) applied, let fall off on their own. Once dry may leave incision open to air. Please no ointments or lotions to incision. No soaking in tubs or pools for 3 months after surgery.    Elevate surgical leg if swelling present in leg above heart.    Continue to wear knee high teds for 4 weeks. May have off at night. Tubigrip to wear over knee.    *Please call if a sharp increase in pain, change in movement or sensation, chest pain, calf pain, shortness of breath, fevers greater than 101.4, redness, erythema around the incision sites.    *If needing refills on pain meds, please call clinic a day or more before you run out.    *Continue Physical therapy as directed.  If needing orders for Outpatient Physical therapy please call clinic.    *Continue to use assistive device: cane or crutch until no limp. Discuss with therapist if questions.    *Dr. Cruz advises no dental work or cleaning until 6 months after any surgery with implants to hips or knees unless emergent issue arises. This includes total joint surgeries. Once you are 6 months past your surgery, you will need prophylactic antibiotics for the rest of your lifetime with any cleaning or dental work.  This is also for any other \"dirty\" procedures that you may have such as a colonoscopy.      If you have any questions, call the clinic at [phone] and ask for the Orthopaedics dept.         Thanks for coming today.  [facility]  [address] [place] [address]    To schedule future appointments in Ortho Clinic, you may call [phone].    To schedule ordered imaging by your provider:   Call Central Imaging [phone]    To schedule an injection ordered by your provider:  Call Central Imaging Injection scheduling line: [phone]  [employer] available online at:  [email or web address]    Please call if any further questions or concerns ([phone]).  Clinic " hours 8 am to 5 pm.    Return to clinic (call) if symptoms worsen or fail to improve.

## 2019-01-16 ENCOUNTER — RECORDS - HEALTHEAST (OUTPATIENT)
Dept: LAB | Facility: CLINIC | Age: 74
End: 2019-01-16

## 2019-01-17 LAB — INR PPP: 3.11 (ref 0.9–1.1)

## 2019-02-15 DIAGNOSIS — Z96.651 HISTORY OF TOTAL RIGHT KNEE REPLACEMENT: Primary | ICD-10-CM

## 2019-02-19 ENCOUNTER — OFFICE VISIT (OUTPATIENT)
Dept: ORTHOPEDICS | Facility: CLINIC | Age: 74
End: 2019-02-19
Payer: MEDICARE

## 2019-02-19 ENCOUNTER — ANCILLARY PROCEDURE (OUTPATIENT)
Dept: GENERAL RADIOLOGY | Facility: CLINIC | Age: 74
End: 2019-02-19
Attending: ORTHOPAEDIC SURGERY
Payer: MEDICARE

## 2019-02-19 VITALS — OXYGEN SATURATION: 97 % | SYSTOLIC BLOOD PRESSURE: 118 MMHG | HEART RATE: 72 BPM | DIASTOLIC BLOOD PRESSURE: 74 MMHG

## 2019-02-19 DIAGNOSIS — Z96.651 HISTORY OF TOTAL RIGHT KNEE REPLACEMENT: ICD-10-CM

## 2019-02-19 DIAGNOSIS — Z96.651 HISTORY OF TOTAL RIGHT KNEE REPLACEMENT: Primary | ICD-10-CM

## 2019-02-19 PROCEDURE — 99024 POSTOP FOLLOW-UP VISIT: CPT | Performed by: ORTHOPAEDIC SURGERY

## 2019-02-19 PROCEDURE — 73562 X-RAY EXAM OF KNEE 3: CPT | Mod: RT | Performed by: RADIOLOGY

## 2019-02-19 ASSESSMENT — PAIN SCALES - GENERAL: PAINLEVEL: MODERATE PAIN (4)

## 2019-02-19 NOTE — NURSING NOTE
Chase Zhao's chief complaint for this visit includes:  Chief Complaint   Patient presents with     Surgical Followup     Right Total Knee Arthroplasty. DOS: 1/2/2019     PCP: Madhu Miller    Referring Provider:  No referring provider defined for this encounter.    /74 (BP Location: Left arm, Patient Position: Sitting, Cuff Size: Adult Large)   Pulse 72   SpO2 97%   Moderate Pain (4)     Do you need any medication refills at today's visit? no

## 2019-02-19 NOTE — PATIENT INSTRUCTIONS
Thanks for coming today.  [facility]  [address] [place] [address]    To schedule future appointments in Ortho Clinic, you may call [phone].    To schedule ordered imaging by your provider:   Call Central Imaging [phone]    To schedule an injection ordered by your provider:  Call Central Imaging Injection scheduling line: [phone]  [email or web address] available online at:  [email or web address]    Please call if any further questions or concerns ([phone]).  Clinic hours 8 am to 5 pm.    Return to clinic (call) if symptoms worsen or fail to improve.

## 2019-02-19 NOTE — PROGRESS NOTES
"Chief Complaint: Surgical Followup (Right Total Knee Arthroplasty. DOS: 1/2/2019)       HPI: Chase Zhao returns today in follow-up for his right knee. He reports that he is doing very well. He is using no pain medication and his ROM is going well. He is using a cane only when he goes to [employer]. Reports that the knee is much more comfortable than preop. He is happy with how he is doing so far.     Medications and allergies are documented in the EMR and have been reviewed.    Current Outpatient Medications:      blood glucose monitoring ([employer] TEST STRIPS) test strip, Dispense test strips covered by the patient insurance. Test 4 times per day, before meals and at bedtime., Disp: , Rfl:      Blood Glucose Monitoring Suppl (FIFTY50 GLUCOSE METER 2.0) w/Device KIT, Dispense meter, test strips, lancets covered by pt ins. E11.65 IDDM type II, uncontrolled - Test 6 times/day. Reason: Unstable diabetes, Disp: , Rfl:      cycloSPORINE (RESTASIS) 0.05 % ophthalmic emulsion, Place 1 drop into both eyes 2 times daily, Disp: , Rfl:      Insulin Lispro (HUMALOG PEN SC), 70/30 flex pen  Take 24 units in the morning and 26 units in the evening., Disp: , Rfl:      Insulin Syringe 30G X 5/16\" 1 ML MISC, Dispense item covered by patient insurance. For administering insulin at home., Disp: , Rfl:      NOVOFINE 30G X 8 MM insulin pen needle, , Disp: , Rfl:      citalopram (CELEXA) 40 MG tablet, Take 1 tablet (40 mg) by mouth daily, Disp: , Rfl:      clonazePAM (KLONOPIN) 1 MG tablet, Take 1 tablet (1 mg) by mouth At Bedtime, Disp: 30 tablet, Rfl: 0     JANTOVEN 5 MG tablet, Take 1-1.5 tablets (5-7.5 mg) by mouth daily 1 to 1.5 tablets daily, Disp: , Rfl:      liraglutide (VICTOZA PEN) 18 MG/3ML solution, Inject 1.8 mg Subcutaneous daily, Disp: , Rfl:      lisinopril (PRINIVIL/ZESTRIL) 5 MG tablet, Take 1 tablet (5 mg) by mouth daily, Disp: 30 tablet, Rfl: 0     metoprolol tartrate (LOPRESSOR) 25 MG tablet, Take 1 tablet (25 mg) by " mouth 2 times daily, Disp: , Rfl:      nitroGLYcerin (NITROSTAT) 0.4 MG sublingual tablet, Place 1 tablet (0.4 mg) under the tongue every 5 minutes as needed, Disp: , Rfl:      rosuvastatin (CRESTOR) 40 MG tablet, Take 1 tablet (40 mg) by mouth At Bedtime, Disp: 30 tablet, Rfl: 0     warfarin (COUMADIN) 10 MG tablet, Take 1 tablet (10 mg) by mouth daily, Disp: 30 tablet, Rfl: 0  Allergies: Patient has no known allergies.    Physical Exam:  On physical examination the patient appears the stated age, is in no acute distress, affect is appropriate, and breathing is non-labored.  /74 (BP Location: Left arm, Patient Position: Sitting, Cuff Size: Adult Large)   Pulse 72   SpO2 97%   There is no height or weight on file to calculate BMI.  Gait: mild flexed knee gait   Incision: healed and benign, no effusion   ROM: 5-110  Distally, the circulatory, motor, and sensation exam is intact with 5/5 EHL, gastroc-soleus, and tibialis anterior.  Sensation to light touch is intact.  Dorsalis pedis and posterior tibialis pulses are palpable.  There are no sores on the feet, no bruising, and no lymphedema.    X-rays:    I reviewed the x-rays dated today.  Previous films reviewed.    Findings:  Normal progression for a total kneearthroplasty without evidence of loosening or subsidence.    Assessment: doing well. Appropriate for outpt PT and this is at the request of the patient.   Plan: RTC in 6 weeks, sooner if issues.     No ref. provider found

## 2019-02-19 NOTE — LETTER
"    2/19/2019         RE: Chase Zhao  [address] [place] [address]        Dear Colleague,    Thank you for referring your patient, Chase Zhao, to the [facility]. Please see a copy of my visit note below.    Chief Complaint: Surgical Followup (Right Total Knee Arthroplasty. DOS: 1/2/2019)       HPI: Chase Zhao returns today in follow-up for his right knee. He reports that he is doing very well. He is using no pain medication and his ROM is going well. He is using a cane only when he goes to [employer]. Reports that the knee is much more comfortable than preop. He is happy with how he is doing so far.     Medications and allergies are documented in the EMR and have been reviewed.    Current Outpatient Medications:      blood glucose monitoring ([employer] TEST STRIPS) test strip, Dispense test strips covered by the patient insurance. Test 4 times per day, before meals and at bedtime., Disp: , Rfl:      Blood Glucose Monitoring Suppl (FIFTY50 GLUCOSE METER 2.0) w/Device KIT, Dispense meter, test strips, lancets covered by pt ins. E11.65 IDDM type II, uncontrolled - Test 6 times/day. Reason: Unstable diabetes, Disp: , Rfl:      cycloSPORINE (RESTASIS) 0.05 % ophthalmic emulsion, Place 1 drop into both eyes 2 times daily, Disp: , Rfl:      Insulin Lispro (HUMALOG PEN SC), 70/30 flex pen  Take 24 units in the morning and 26 units in the evening., Disp: , Rfl:      Insulin Syringe 30G X 5/16\" 1 ML MISC, Dispense item covered by patient insurance. For administering insulin at home., Disp: , Rfl:      NOVOFINE 30G X 8 MM insulin pen needle, , Disp: , Rfl:      citalopram (CELEXA) 40 MG tablet, Take 1 tablet (40 mg) by mouth daily, Disp: , Rfl:      clonazePAM (KLONOPIN) 1 MG tablet, Take 1 tablet (1 mg) by mouth At Bedtime, Disp: 30 tablet, Rfl: 0     JANTOVEN 5 MG tablet, Take 1-1.5 tablets (5-7.5 mg) by mouth daily 1 to 1.5 tablets daily, Disp: , Rfl:      liraglutide (VICTOZA PEN) 18 " MG/3ML solution, Inject 1.8 mg Subcutaneous daily, Disp: , Rfl:      lisinopril (PRINIVIL/ZESTRIL) 5 MG tablet, Take 1 tablet (5 mg) by mouth daily, Disp: 30 tablet, Rfl: 0     metoprolol tartrate (LOPRESSOR) 25 MG tablet, Take 1 tablet (25 mg) by mouth 2 times daily, Disp: , Rfl:      nitroGLYcerin (NITROSTAT) 0.4 MG sublingual tablet, Place 1 tablet (0.4 mg) under the tongue every 5 minutes as needed, Disp: , Rfl:      rosuvastatin (CRESTOR) 40 MG tablet, Take 1 tablet (40 mg) by mouth At Bedtime, Disp: 30 tablet, Rfl: 0     warfarin (COUMADIN) 10 MG tablet, Take 1 tablet (10 mg) by mouth daily, Disp: 30 tablet, Rfl: 0  Allergies: Patient has no known allergies.    Physical Exam:  On physical examination the patient appears the stated age, is in no acute distress, affect is appropriate, and breathing is non-labored.  /74 (BP Location: Left arm, Patient Position: Sitting, Cuff Size: Adult Large)   Pulse 72   SpO2 97%   There is no height or weight on file to calculate BMI.  Gait: mild flexed knee gait   Incision: healed and benign, no effusion   ROM: 5-110  Distally, the circulatory, motor, and sensation exam is intact with 5/5 EHL, gastroc-soleus, and tibialis anterior.  Sensation to light touch is intact.  Dorsalis pedis and posterior tibialis pulses are palpable.  There are no sores on the feet, no bruising, and no lymphedema.    X-rays:    I reviewed the x-rays dated today.  Previous films reviewed.    Findings:  Normal progression for a total kneearthroplasty without evidence of loosening or subsidence.    Assessment: doing well. Appropriate for outpt PT and this is at the request of the patient.   Plan: RTC in 6 weeks, sooner if issues.     No ref. provider found      Again, thank you for allowing me to participate in the care of your patient.        Sincerely,        Marco Cruz MD

## 2019-02-26 ENCOUNTER — THERAPY VISIT (OUTPATIENT)
Dept: PHYSICAL THERAPY | Facility: CLINIC | Age: 74
End: 2019-02-26
Payer: MEDICARE

## 2019-02-26 DIAGNOSIS — M25.561 KNEE PAIN, RIGHT: ICD-10-CM

## 2019-02-26 DIAGNOSIS — Z96.651 STATUS POST TOTAL RIGHT KNEE REPLACEMENT: ICD-10-CM

## 2019-02-26 PROCEDURE — 97161 PT EVAL LOW COMPLEX 20 MIN: CPT | Mod: GP | Performed by: PHYSICAL THERAPIST

## 2019-02-26 PROCEDURE — 97110 THERAPEUTIC EXERCISES: CPT | Mod: GP | Performed by: PHYSICAL THERAPIST

## 2019-02-26 ASSESSMENT — ACTIVITIES OF DAILY LIVING (ADL)
WALK: ACTIVITY IS NOT DIFFICULT
GIVING WAY, BUCKLING OR SHIFTING OF KNEE: I DO NOT HAVE THE SYMPTOM
SIT WITH YOUR KNEE BENT: ACTIVITY IS SOMEWHAT DIFFICULT
KNEE_ACTIVITY_OF_DAILY_LIVING_SUM: 52
RAW_SCORE: 52
SWELLING: I DO NOT HAVE THE SYMPTOM
HOW_WOULD_YOU_RATE_THE_OVERALL_FUNCTION_OF_YOUR_KNEE_DURING_YOUR_USUAL_DAILY_ACTIVITIES?: NEARLY NORMAL
GO UP STAIRS: ACTIVITY IS SOMEWHAT DIFFICULT
HOW_WOULD_YOU_RATE_THE_CURRENT_FUNCTION_OF_YOUR_KNEE_DURING_YOUR_USUAL_DAILY_ACTIVITIES_ON_A_SCALE_FROM_0_TO_100_WITH_100_BEING_YOUR_LEVEL_OF_KNEE_FUNCTION_PRIOR_TO_YOUR_INJURY_AND_0_BEING_THE_INABILITY_TO_PERFORM_ANY_OF_YOUR_USUAL_DAILY_ACTIVITIES?: 5
KNEEL ON THE FRONT OF YOUR KNEE: I AM UNABLE TO DO THE ACTIVITY
LIMPING: I HAVE THE SYMPTOM BUT IT DOES NOT AFFECT MY ACTIVITY
RISE FROM A CHAIR: ACTIVITY IS MINIMALLY DIFFICULT
WEAKNESS: I DO NOT HAVE THE SYMPTOM
STAND: ACTIVITY IS MINIMALLY DIFFICULT
GO DOWN STAIRS: ACTIVITY IS SOMEWHAT DIFFICULT
STIFFNESS: I DO NOT HAVE THE SYMPTOM
SQUAT: ACTIVITY IS FAIRLY DIFFICULT
AS_A_RESULT_OF_YOUR_KNEE_INJURY,_HOW_WOULD_YOU_RATE_YOUR_CURRENT_LEVEL_OF_DAILY_ACTIVITY?: NEARLY NORMAL
PAIN: I HAVE THE SYMPTOM BUT IT DOES NOT AFFECT MY ACTIVITY
KNEE_ACTIVITY_OF_DAILY_LIVING_SCORE: 74.29

## 2019-02-26 NOTE — PROGRESS NOTES
[facility] Initial Evaluation  Subjective:  The history is provided by the patient and the spouse.   Chase Zhao is a 74 year old male with a right knee condition.  Condition occurred with:  Degenerative joint disease.    This is a new condition  Pt underwent R TKA on 1/2/19. Lives at home with wife. Needs to go up/down 2 steps with railings to get in/out of home. Walks around with cane, but has been walking house hold distances without cane. Feels his pain is well regulated. Gets most discomfort when sitting for prolonged periods and when trying to sleep at night. Walking he gets 1/10 pain. Has been getting in home therapy for the past month. Orders as of 2/19/19..    Patient reports pain:  Anterior.    Pain is described as aching  and reported as 1/10.      and relieved by ice and NSAID's.      Previous treatment includes physical therapy (In home therapy).  There was moderate improvement following previous treatment.                                                Objective:  System    Ankle/Foot Evaluation  ROM:        Strength:    Dorsiflexion:  Left: 5/5     Pain:   Right: 5/5   Pain:                                                                         Hip Evaluation    Hip Strength:    Flexion:   Left: 4+/5   Pain:  Right: 4+/5   Pain:                    Extension:  Left: 5-/5  Pain:Right: 5-/5    Pain:    Abduction:  Left: 4+/5     Pain:Right: 4+/5    Pain:  Adduction:  Left: 5-/5    Pain:Right: 5-/5   Pain:      Knee Flexion:  Left: 5-/5   Pain:Right: 5-/5   Pain:  Knee Extension:  Left: 5-/5   Pain:Right: 5-/5    Pain:                 Knee Evaluation:  ROM:    AROM      Extension: Left:    Right:  2  Flexion: Left:   Right: 105  PROM        Flexion: Left:   Right:  110              Edema:    Circumference:      Joint Line:  Right:  44.5 cm      Functional Testing:          Quad:    Single Leg Squat:  Left:      Right:        Bilateral Leg Squat:   Mild loss of control, hip  substitution, femoral IR and excessive anterior knee excursion                  General Evaluation:                      Balance:  Balance wnl general: 30 sec EC feet together.  Single Leg Stance--Eyes Open:  Left: 2/30 sec    Right: 2/30 sec                                                     ROS    Assessment/Plan:    Patient is a 74 year old male with right side knee complaints.    Patient has the following significant findings with corresponding treatment plan.                Diagnosis 1:  S/p R TKA  Pain -  hot/cold therapy, US, electric stimulation, mechanical traction, manual therapy, splint/taping/bracing/orthotics, self management, education, directional preference exercise and home program  Decreased ROM/flexibility - manual therapy, therapeutic exercise and home program  Decreased joint mobility - manual therapy, therapeutic exercise and home program  Decreased strength - therapeutic exercise, therapeutic activities and home program  Impaired balance - neuro re-education, therapeutic activities and home program  Decreased proprioception - neuro re-education, therapeutic activities and home program  Edema - vasopneumatics, electric stimulation, cold therapy, cryocuff and self management/home program  Impaired gait - gait training and home program  Impaired muscle performance - neuro re-education and home program  Decreased function - therapeutic activities and home program    Therapy Evaluation Codes:   1) History comprised of:   Personal factors that impact the plan of care:      None.    Comorbidity factors that impact the plan of care are:      Diabetes, Depression, Heart problems, High blood pressure, Implanted device, Sleep disorder/apnea and Stroke.     Medications impacting care: Anti-depressant, High blood pressure and Heparin/coumadin.  2) Examination of Body Systems comprised of:   Body structures and functions that impact the plan of care:      Knee.   Activity limitations that impact the plan  of care are:      Bending, Dressing, Lifting, Sitting, Stairs, Standing, Walking and Sleeping.  3) Clinical presentation characteristics are:   Stable/Uncomplicated.  4) Decision-Making    Low complexity using standardized patient assessment instrument and/or measureable assessment of functional outcome.  Cumulative Therapy Evaluation is: Low complexity.    Previous and current functional limitations:  (See Goal Flow Sheet for this information)    Short term and Long term goals: (See Goal Flow Sheet for this information)     Communication ability:  Patient appears to be able to clearly communicate and understand verbal and written communication and follow directions correctly.  Treatment Explanation - The following has been discussed with the patient:   RX ordered/plan of care  Anticipated outcomes  Possible risks and side effects  This patient would benefit from PT intervention to resume normal activities.   Rehab potential is good.    Frequency:  2 X week, once daily  Duration:  for 8 weeks  Discharge Plan:  Achieve all LTG.  Independent in home treatment program.  Reach maximal therapeutic benefit.    Please refer to the daily flowsheet for treatment today, total treatment time and time spent performing 1:1 timed codes.

## 2019-02-26 NOTE — LETTER
DEPARTMENT OF HEALTH AND HUMAN SERVICES  [facility] MEDICA[facility] SERVICES    PLAN/UPDATED PLAN OF PROGRESS FOR OUTPATIENT REHABILITATION    PATIENTS NAME:  Chase Zhao     : 1945    PROVIDER NUMBER:    [id number]    HICN: [id number]      PROVIDER NAME: [facility]    MEDICAL RECORD NUMBER: [id number]     START OF CARE DATE:  SOC Date: 19   TYPE:  PT    PRIMARY/TREATMENT DIAGNOSIS: (Pertinent Medical Diagnosis)     Status post total right knee replacement  Knee pain, right    VISITS FROM START OF CARE:  Rxs Used: 1     [facility] Initial Evaluation  Subjective:  The history is provided by the patient and the spouse. Chase Zhao is a 74 year old male with a right knee condition.  Condition occurred with:  Degenerative joint disease.    This is a new condition  Pt underwent R TKA on 19. Lives at home with wife. Needs to go up/down 2 steps with railings to get in/out of home. Walks around with cane, but has been walking house hold distances without cane. Feels his pain is well regulated. Gets most discomfort when sitting for prolonged periods and when trying to sleep at night. Walking he gets 1/10 pain. Has been getting in home therapy for the past month. Orders as of 19..  Patient reports pain:  Anterior.    Pain is described as aching  and reported as 1/10.      and relieved by ice and NSAID's.      Previous treatment includes physical therapy (In home therapy).  There was moderate improvement following previous treatment.                    Objective:  System  Ankle/Foot Evaluation  ROM:    Strength:    Dorsiflexion:  Left: 5/5     Pain:   Right: 5/5   Pain:  Hip Evaluation  Hip Strength:    Flexion:   Left: 4+/5   Pain:  Right: 4+/5   Pain:               Extension:  Left: 5-/5  Pain:Right: 5-/5    Pain:    Abduction:  Left: 4+/5     Pain:Right: 4+/5    Pain:  Adduction:  Left: 5-/5    Pain:Right: 5-/5   Pain:  Knee Flexion:  Left: 5-/5    Pain:Right: 5-/5   Pain:  Knee Extension:  Left: 5-/5   Pain:Right: 5-/5    Pain:    PATIENTS NAME:  Chase Zhao   : 1945      Knee Evaluation:  ROM:    AROM  Extension: Left:    Right:  2  Flexion: Left:   Right: 105  PROM  Flexion: Left:   Right:  110  Edema:    Circumference:  Joint Line:  Right:  44.5 cm  Functional Testing:    Quad:    Single Leg Squat:  Left:      Right:        Bilateral Leg Squat:   Mild loss of control, hip substitution, femoral IR and excessive anterior knee excursion    General Evaluation:  Balance:  Balance wnl general: 30 sec EC feet together.  Single Leg Stance--Eyes Open:  Left: 2/30 sec    Right: 2/30 sec    Assessment/Plan:    Patient is a 74 year old male with right side knee complaints.    Patient has the following significant findings with corresponding treatment plan.                Diagnosis 1:  S/p R TKA  Pain -  hot/cold therapy, US, electric stimulation, mechanical traction, manual therapy, splint/taping/bracing/orthotics, self management, education, directional preference exercise and home program  Decreased ROM/flexibility - manual therapy, therapeutic exercise and home program  Decreased joint mobility - manual therapy, therapeutic exercise and home program  Decreased strength - therapeutic exercise, therapeutic activities and home program  Impaired balance - neuro re-education, therapeutic activities and home program  Decreased proprioception - neuro re-education, therapeutic activities and home program  Edema - vasopneumatics, electric stimulation, cold therapy, cryocuff and self management/home program  Impaired gait - gait training and home program  Impaired muscle performance - neuro re-education and home program  Decreased function - therapeutic activities and home program  Therapy Evaluation Codes:   1) History comprised of:   Personal factors that impact the plan of care:      None.    Comorbidity factors that impact the plan of care are:      Diabetes,  Depression, Heart problems, High blood pressure, Implanted device,  Sleep disorder/apnea and Stroke.     Medications impacting care: Anti-depressant, High blood pressure and  Heparin/coumadin.  2) Examination of Body Systems comprised of:   Body structures and functions that impact the plan of care:      Knee.  PATIENTS NAME:  Chase Zhao   : 1945      Activity limitations that impact the plan of care are:      Bending, Dressing, Lifting, Sitting, Stairs, Standing, Walking and Sleeping.  3) Clinical presentation characteristics are:   Stable/Uncomplicated.  4) Decision-Making    Low complexity using standardized patient assessment instrument and/or  measureable assessment of functional outcome.  Cumulative Therapy Evaluation is: Low complexity.  Previous and current functional limitations:  (See Goal Flow Sheet for this information)    Short term and Long term goals: (See Goal Flow Sheet for this information)   Communication ability:  Patient appears to be able to clearly communicate and understand verbal and written communication and follow directions correctly.  Treatment Explanation - The following has been discussed with the patient:   RX ordered/plan of care  Anticipated outcomes  Possible risks and side effects  This patient would benefit from PT intervention to resume normal activities.   Rehab potential is good.  Frequency:  2 X week, once daily  Duration:  for 8 weeks  Discharge Plan:  Achieve all LTG.  Independent in home treatment program.  Reach maximal therapeutic benefit.      Caregiver Signature/Credentials _____________________________ Date __________              Madi Eagle DPT   I have reviewed and certified the need for these services and plan of treatment while under my care.        PHYSICIAN'S SIGNATURE:   _____________________________________  Date___________     Marco Cruz MD    Certification period:  Beginning of Cert date period: 19 to  End of Cert period date:  "05/26/19   Functional Level Progress Report: Please see attached \"Goal Flow sheet for Functional level.\"  ____X____ Continue Services or       ________ DC Services                Service dates: From  SOC Date: 02/26/19 date to present                         "

## 2019-02-28 ENCOUNTER — THERAPY VISIT (OUTPATIENT)
Dept: PHYSICAL THERAPY | Facility: CLINIC | Age: 74
End: 2019-02-28
Payer: MEDICARE

## 2019-02-28 DIAGNOSIS — Z96.651 STATUS POST TOTAL RIGHT KNEE REPLACEMENT: ICD-10-CM

## 2019-02-28 DIAGNOSIS — M25.561 ACUTE PAIN OF RIGHT KNEE: ICD-10-CM

## 2019-02-28 PROCEDURE — 97110 THERAPEUTIC EXERCISES: CPT | Mod: GP | Performed by: PHYSICAL THERAPY ASSISTANT

## 2019-02-28 PROCEDURE — 97112 NEUROMUSCULAR REEDUCATION: CPT | Mod: GP | Performed by: PHYSICAL THERAPY ASSISTANT

## 2019-03-06 ENCOUNTER — THERAPY VISIT (OUTPATIENT)
Dept: PHYSICAL THERAPY | Facility: CLINIC | Age: 74
End: 2019-03-06
Payer: MEDICARE

## 2019-03-06 DIAGNOSIS — M25.561 ACUTE PAIN OF RIGHT KNEE: ICD-10-CM

## 2019-03-06 DIAGNOSIS — Z96.651 STATUS POST TOTAL RIGHT KNEE REPLACEMENT: ICD-10-CM

## 2019-03-06 PROCEDURE — 97110 THERAPEUTIC EXERCISES: CPT | Mod: GP | Performed by: PHYSICAL THERAPIST

## 2019-03-06 PROCEDURE — 97112 NEUROMUSCULAR REEDUCATION: CPT | Mod: GP | Performed by: PHYSICAL THERAPIST

## 2019-03-08 ENCOUNTER — THERAPY VISIT (OUTPATIENT)
Dept: PHYSICAL THERAPY | Facility: CLINIC | Age: 74
End: 2019-03-08
Payer: MEDICARE

## 2019-03-08 DIAGNOSIS — M25.561 ACUTE PAIN OF RIGHT KNEE: ICD-10-CM

## 2019-03-08 DIAGNOSIS — Z96.651 STATUS POST TOTAL RIGHT KNEE REPLACEMENT: ICD-10-CM

## 2019-03-08 PROCEDURE — 97112 NEUROMUSCULAR REEDUCATION: CPT | Mod: GP | Performed by: PHYSICAL THERAPIST

## 2019-03-08 PROCEDURE — 97110 THERAPEUTIC EXERCISES: CPT | Mod: GP | Performed by: PHYSICAL THERAPIST

## 2019-03-13 ENCOUNTER — THERAPY VISIT (OUTPATIENT)
Dept: PHYSICAL THERAPY | Facility: CLINIC | Age: 74
End: 2019-03-13
Payer: MEDICARE

## 2019-03-13 DIAGNOSIS — M25.561 ACUTE PAIN OF RIGHT KNEE: ICD-10-CM

## 2019-03-13 DIAGNOSIS — Z96.651 STATUS POST TOTAL RIGHT KNEE REPLACEMENT: ICD-10-CM

## 2019-03-13 PROCEDURE — 97112 NEUROMUSCULAR REEDUCATION: CPT | Mod: GP | Performed by: PHYSICAL THERAPIST

## 2019-03-13 PROCEDURE — 97110 THERAPEUTIC EXERCISES: CPT | Mod: GP | Performed by: PHYSICAL THERAPIST

## 2019-03-15 ENCOUNTER — THERAPY VISIT (OUTPATIENT)
Dept: PHYSICAL THERAPY | Facility: CLINIC | Age: 74
End: 2019-03-15
Payer: MEDICARE

## 2019-03-15 DIAGNOSIS — Z96.651 STATUS POST TOTAL RIGHT KNEE REPLACEMENT: ICD-10-CM

## 2019-03-15 DIAGNOSIS — M25.561 ACUTE PAIN OF RIGHT KNEE: ICD-10-CM

## 2019-03-15 PROCEDURE — 97110 THERAPEUTIC EXERCISES: CPT | Mod: GP | Performed by: PHYSICAL THERAPIST

## 2019-03-15 PROCEDURE — 97112 NEUROMUSCULAR REEDUCATION: CPT | Mod: GP | Performed by: PHYSICAL THERAPIST

## 2019-03-20 ENCOUNTER — THERAPY VISIT (OUTPATIENT)
Dept: PHYSICAL THERAPY | Facility: CLINIC | Age: 74
End: 2019-03-20
Payer: MEDICARE

## 2019-03-20 DIAGNOSIS — Z96.651 STATUS POST TOTAL RIGHT KNEE REPLACEMENT: ICD-10-CM

## 2019-03-20 DIAGNOSIS — M25.561 ACUTE PAIN OF RIGHT KNEE: ICD-10-CM

## 2019-03-20 PROCEDURE — 97110 THERAPEUTIC EXERCISES: CPT | Mod: GP | Performed by: PHYSICAL THERAPIST

## 2019-03-20 PROCEDURE — 97112 NEUROMUSCULAR REEDUCATION: CPT | Mod: GP | Performed by: PHYSICAL THERAPIST

## 2019-03-22 ENCOUNTER — THERAPY VISIT (OUTPATIENT)
Dept: PHYSICAL THERAPY | Facility: CLINIC | Age: 74
End: 2019-03-22
Payer: MEDICARE

## 2019-03-22 DIAGNOSIS — M25.561 ACUTE PAIN OF RIGHT KNEE: ICD-10-CM

## 2019-03-22 DIAGNOSIS — Z96.651 STATUS POST TOTAL RIGHT KNEE REPLACEMENT: ICD-10-CM

## 2019-03-22 PROCEDURE — 97112 NEUROMUSCULAR REEDUCATION: CPT | Mod: GP | Performed by: PHYSICAL THERAPIST

## 2019-03-22 PROCEDURE — 97110 THERAPEUTIC EXERCISES: CPT | Mod: GP | Performed by: PHYSICAL THERAPIST

## 2019-03-27 ENCOUNTER — THERAPY VISIT (OUTPATIENT)
Dept: PHYSICAL THERAPY | Facility: CLINIC | Age: 74
End: 2019-03-27
Payer: MEDICARE

## 2019-03-27 DIAGNOSIS — M25.561 ACUTE PAIN OF RIGHT KNEE: ICD-10-CM

## 2019-03-27 DIAGNOSIS — Z96.651 STATUS POST TOTAL RIGHT KNEE REPLACEMENT: ICD-10-CM

## 2019-03-27 PROCEDURE — 97110 THERAPEUTIC EXERCISES: CPT | Mod: GP | Performed by: PHYSICAL THERAPIST

## 2019-03-27 PROCEDURE — 97530 THERAPEUTIC ACTIVITIES: CPT | Mod: GP | Performed by: PHYSICAL THERAPIST

## 2019-03-27 PROCEDURE — 97112 NEUROMUSCULAR REEDUCATION: CPT | Mod: GP | Performed by: PHYSICAL THERAPIST

## 2019-03-29 ENCOUNTER — THERAPY VISIT (OUTPATIENT)
Dept: PHYSICAL THERAPY | Facility: CLINIC | Age: 74
End: 2019-03-29
Payer: MEDICARE

## 2019-03-29 DIAGNOSIS — M25.561 ACUTE PAIN OF RIGHT KNEE: ICD-10-CM

## 2019-03-29 DIAGNOSIS — Z96.651 STATUS POST TOTAL RIGHT KNEE REPLACEMENT: ICD-10-CM

## 2019-03-29 PROCEDURE — 97110 THERAPEUTIC EXERCISES: CPT | Mod: GP | Performed by: PHYSICAL THERAPIST

## 2019-03-29 PROCEDURE — 97530 THERAPEUTIC ACTIVITIES: CPT | Mod: GP | Performed by: PHYSICAL THERAPIST

## 2019-03-29 PROCEDURE — 97112 NEUROMUSCULAR REEDUCATION: CPT | Mod: GP | Performed by: PHYSICAL THERAPIST

## 2019-03-29 NOTE — PROGRESS NOTES
Subjective:  HPI                    Objective:  System    Physical Exam    General     ROS    Assessment/Plan:    PROGRESS  REPORT    Progress reporting period is from 2/26/19 to 03/29/19.       SUBJECTIVE  Subjective: Pt went to [facility] yesterday and is feeing a little stiff from doing cardio there. Overall still feeling well however. Tried walking for 10 min but wasn't able to get the opportunity to walk outside with wife.    Current Pain level: 1/10.     Initial Pain level: 1/10.   Changes in function:  Yes (See Goal flowsheet attached for changes in current functional level)  Adverse reaction to treatment or activity: None    OBJECTIVE  Changes noted in objective findings:    Objective: 115 AROM, 120 PROM with OP, full extension R knee     ASSESSMENT/PLAN  Updated problem list and treatment plan: Diagnosis 1:  S/p R TKA  Pain -  hot/cold therapy, US, electric stimulation, mechanical traction, manual therapy, splint/taping/bracing/orthotics, self management, education, directional preference exercise and home program  Decreased ROM/flexibility - manual therapy, therapeutic exercise and home program  Decreased joint mobility - manual therapy, therapeutic exercise and home program  Decreased strength - therapeutic exercise, therapeutic activities and home program  Impaired balance - neuro re-education, therapeutic activities and home program  Impaired gait - gait training and home program  Impaired muscle performance - neuro re-education and home program  Decreased function - therapeutic activities and home program  STG/LTGs have been met or progress has been made towards goals:  Yes (See Goal flow sheet completed today.)  Assessment of Progress: The patient's condition is improving.  The patient's condition has potential to improve.  Self Management Plans:  Patient has been instructed in a home treatment program.  Patient  has been instructed in self management of symptoms.  I have re-evaluated this patient and find  that the nature, scope, duration and intensity of the therapy is appropriate for the medical condition of the patient.  Chase continues to require the following intervention to meet STG and LTG's:  PT    Recommendations:  This patient would benefit from continued therapy.     Frequency:  1 X week, once daily  Duration:  for 6 weeks        Please refer to the daily flowsheet for treatment today, total treatment time and time spent performing 1:1 timed codes.

## 2019-04-02 ENCOUNTER — OFFICE VISIT (OUTPATIENT)
Dept: ORTHOPEDICS | Facility: CLINIC | Age: 74
End: 2019-04-02
Payer: MEDICARE

## 2019-04-02 VITALS — OXYGEN SATURATION: 96 % | SYSTOLIC BLOOD PRESSURE: 119 MMHG | DIASTOLIC BLOOD PRESSURE: 72 MMHG | HEART RATE: 65 BPM

## 2019-04-02 DIAGNOSIS — Z47.89 ORTHOPEDIC AFTERCARE: Primary | ICD-10-CM

## 2019-04-02 PROCEDURE — 99024 POSTOP FOLLOW-UP VISIT: CPT | Performed by: ORTHOPAEDIC SURGERY

## 2019-04-02 NOTE — LETTER
"    4/2/2019         RE: Chase Zhao  [address] [place] [address]        Dear Colleague,    Thank you for referring your patient, Chase Zhao, to the [facility]. Please see a copy of my visit note below.    Chief Complaint: RECHECK (3 month Right TKA check )       HPI: Chase Zhao returns today in follow-up for his right knee. He reports that he is doing very well. He is back to his usual activities. No pain medication. ROM in PT is 0-120. Happy with how he is doing.     Medications and allergies are documented in the EMR and have been reviewed.    Current Outpatient Medications:      blood glucose monitoring ([employer] TEST STRIPS) test strip, Dispense test strips covered by the patient insurance. Test 4 times per day, before meals and at bedtime., Disp: , Rfl:      Blood Glucose Monitoring Suppl (FIFTY50 GLUCOSE METER 2.0) w/Device KIT, Dispense meter, test strips, lancets covered by pt ins. E11.65 IDDM type II, uncontrolled - Test 6 times/day. Reason: Unstable diabetes, Disp: , Rfl:      cycloSPORINE (RESTASIS) 0.05 % ophthalmic emulsion, Place 1 drop into both eyes 2 times daily, Disp: , Rfl:      Insulin Lispro (HUMALOG PEN SC), 70/30 flex pen  Take 24 units in the morning and 26 units in the evening., Disp: , Rfl:      Insulin Syringe 30G X 5/16\" 1 ML MISC, Dispense item covered by patient insurance. For administering insulin at home., Disp: , Rfl:      NOVOFINE 30G X 8 MM insulin pen needle, , Disp: , Rfl:      citalopram (CELEXA) 40 MG tablet, Take 1 tablet (40 mg) by mouth daily, Disp: , Rfl:      clonazePAM (KLONOPIN) 1 MG tablet, Take 1 tablet (1 mg) by mouth At Bedtime, Disp: 30 tablet, Rfl: 0     JANTOVEN 5 MG tablet, Take 1-1.5 tablets (5-7.5 mg) by mouth daily 1 to 1.5 tablets daily, Disp: , Rfl:      liraglutide (VICTOZA PEN) 18 MG/3ML solution, Inject 1.8 mg Subcutaneous daily, Disp: , Rfl:      lisinopril (PRINIVIL/ZESTRIL) 5 MG tablet, Take 1 tablet (5 mg) " by mouth daily, Disp: 30 tablet, Rfl: 0     metoprolol tartrate (LOPRESSOR) 25 MG tablet, Take 1 tablet (25 mg) by mouth 2 times daily, Disp: , Rfl:      nitroGLYcerin (NITROSTAT) 0.4 MG sublingual tablet, Place 1 tablet (0.4 mg) under the tongue every 5 minutes as needed, Disp: , Rfl:      rosuvastatin (CRESTOR) 40 MG tablet, Take 1 tablet (40 mg) by mouth At Bedtime, Disp: 30 tablet, Rfl: 0     warfarin (COUMADIN) 10 MG tablet, Take 1 tablet (10 mg) by mouth daily, Disp: 30 tablet, Rfl: 0  Allergies: Patient has no known allergies.    Physical Exam:  On physical examination the patient appears the stated age, is in no acute distress, affect is appropriate, and breathing is non-labored.  /72   Pulse 65   SpO2 96%   There is no height or weight on file to calculate BMI.  Gait: normal   ROM 0-120    Assessment: doing well   Plan: appropriate for one year follow-up. Sooner if issues.     No ref. provider found      Again, thank you for allowing me to participate in the care of your patient.        Sincerely,        Marco Cruz MD

## 2019-04-02 NOTE — PATIENT INSTRUCTIONS
Thanks for coming today.  [facility]  [address] [place] [address]    To schedule future appointments in Ortho Clinic, you may call [phone].    To schedule ordered imaging by your Provider: Call [facility] at [phone]    [employer] available online at:   [email or web address]    Please call if any further questions or concerns [phone] and ask for the Orthopedic Department. Clinic hours 8 am to 5 pm.    Return to clinic if symptoms worsen.

## 2019-04-02 NOTE — PROGRESS NOTES
"Chief Complaint: RECHECK (3 month Right TKA check )       HPI: Chase Zhao returns today in follow-up for his right knee. He reports that he is doing very well. He is back to his usual activities. No pain medication. ROM in PT is 0-120. Happy with how he is doing.     Medications and allergies are documented in the EMR and have been reviewed.    Current Outpatient Medications:      blood glucose monitoring ([employer] TEST STRIPS) test strip, Dispense test strips covered by the patient insurance. Test 4 times per day, before meals and at bedtime., Disp: , Rfl:      Blood Glucose Monitoring Suppl (FIFTY50 GLUCOSE METER 2.0) w/Device KIT, Dispense meter, test strips, lancets covered by pt ins. E11.65 IDDM type II, uncontrolled - Test 6 times/day. Reason: Unstable diabetes, Disp: , Rfl:      cycloSPORINE (RESTASIS) 0.05 % ophthalmic emulsion, Place 1 drop into both eyes 2 times daily, Disp: , Rfl:      Insulin Lispro (HUMALOG PEN SC), 70/30 flex pen  Take 24 units in the morning and 26 units in the evening., Disp: , Rfl:      Insulin Syringe 30G X 5/16\" 1 ML MISC, Dispense item covered by patient insurance. For administering insulin at home., Disp: , Rfl:      NOVOFINE 30G X 8 MM insulin pen needle, , Disp: , Rfl:      citalopram (CELEXA) 40 MG tablet, Take 1 tablet (40 mg) by mouth daily, Disp: , Rfl:      clonazePAM (KLONOPIN) 1 MG tablet, Take 1 tablet (1 mg) by mouth At Bedtime, Disp: 30 tablet, Rfl: 0     JANTOVEN 5 MG tablet, Take 1-1.5 tablets (5-7.5 mg) by mouth daily 1 to 1.5 tablets daily, Disp: , Rfl:      liraglutide (VICTOZA PEN) 18 MG/3ML solution, Inject 1.8 mg Subcutaneous daily, Disp: , Rfl:      lisinopril (PRINIVIL/ZESTRIL) 5 MG tablet, Take 1 tablet (5 mg) by mouth daily, Disp: 30 tablet, Rfl: 0     metoprolol tartrate (LOPRESSOR) 25 MG tablet, Take 1 tablet (25 mg) by mouth 2 times daily, Disp: , Rfl:      nitroGLYcerin (NITROSTAT) 0.4 MG sublingual tablet, Place 1 tablet (0.4 mg) under the tongue " every 5 minutes as needed, Disp: , Rfl:      rosuvastatin (CRESTOR) 40 MG tablet, Take 1 tablet (40 mg) by mouth At Bedtime, Disp: 30 tablet, Rfl: 0     warfarin (COUMADIN) 10 MG tablet, Take 1 tablet (10 mg) by mouth daily, Disp: 30 tablet, Rfl: 0  Allergies: Patient has no known allergies.    Physical Exam:  On physical examination the patient appears the stated age, is in no acute distress, affect is appropriate, and breathing is non-labored.  /72   Pulse 65   SpO2 96%   There is no height or weight on file to calculate BMI.  Gait: normal   ROM 0-120    Assessment: doing well   Plan: appropriate for one year follow-up. Sooner if issues.     No ref. provider found

## 2019-04-02 NOTE — NURSING NOTE
Chase Zhao's chief complaint for this visit includes:  Chief Complaint   Patient presents with     RECHECK     3 month Right TKA check      PCP: Madhu Miller    Referring Provider:  No referring provider defined for this encounter.    /72   Pulse 65   SpO2 96%   Data Unavailable     Do you need any medication refills at today's visit? no

## 2019-04-05 ENCOUNTER — TELEPHONE (OUTPATIENT)
Dept: ORTHOPEDICS | Facility: CLINIC | Age: 74
End: 2019-04-05

## 2019-04-05 NOTE — TELEPHONE ENCOUNTER
[facility] Call Center    Phone Message    May a detailed message be left on voicemail: yes    Reason for Call: Other: Patient's wife called and stated they were in 04/04/2019 and saw Dr. Cruz. Patient is wondering if he still needs to continue PT. Please advise.     Action Taken: Message routed to:  Adult Clinics: Orthopedics p [phone]

## 2019-04-09 NOTE — TELEPHONE ENCOUNTER
Call to patient and his wife. Patient is hard of hearing and had me discuss with his wife his questions regarding physical therapy. Per PT last note dated 3/29/19 they are recommending patient would benefit from continued therapy.     Frequency:  1 X week, once daily  Duration:  for 6 weeks. This was reviewed with patient and spouse and they will call to follow up with PT regarding this. Candelaria Royal RN

## 2019-04-12 ENCOUNTER — THERAPY VISIT (OUTPATIENT)
Dept: PHYSICAL THERAPY | Facility: CLINIC | Age: 74
End: 2019-04-12
Payer: MEDICARE

## 2019-04-12 DIAGNOSIS — Z96.651 STATUS POST TOTAL RIGHT KNEE REPLACEMENT: ICD-10-CM

## 2019-04-12 DIAGNOSIS — M25.561 ACUTE PAIN OF RIGHT KNEE: ICD-10-CM

## 2019-04-12 PROCEDURE — 97110 THERAPEUTIC EXERCISES: CPT | Mod: GP | Performed by: PHYSICAL THERAPIST

## 2019-04-12 PROCEDURE — 97112 NEUROMUSCULAR REEDUCATION: CPT | Mod: GP | Performed by: PHYSICAL THERAPIST

## 2019-04-26 ENCOUNTER — THERAPY VISIT (OUTPATIENT)
Dept: PHYSICAL THERAPY | Facility: CLINIC | Age: 74
End: 2019-04-26
Payer: MEDICARE

## 2019-04-26 DIAGNOSIS — M25.561 ACUTE PAIN OF RIGHT KNEE: ICD-10-CM

## 2019-04-26 DIAGNOSIS — Z96.651 STATUS POST TOTAL RIGHT KNEE REPLACEMENT: ICD-10-CM

## 2019-04-26 PROCEDURE — 97110 THERAPEUTIC EXERCISES: CPT | Mod: GP | Performed by: PHYSICAL THERAPIST

## 2019-04-26 PROCEDURE — 97530 THERAPEUTIC ACTIVITIES: CPT | Mod: GP | Performed by: PHYSICAL THERAPIST

## 2019-04-26 PROCEDURE — 97112 NEUROMUSCULAR REEDUCATION: CPT | Mod: GP | Performed by: PHYSICAL THERAPIST

## 2019-05-10 ENCOUNTER — THERAPY VISIT (OUTPATIENT)
Dept: PHYSICAL THERAPY | Facility: CLINIC | Age: 74
End: 2019-05-10
Payer: MEDICARE

## 2019-05-10 DIAGNOSIS — Z96.651 STATUS POST TOTAL RIGHT KNEE REPLACEMENT: ICD-10-CM

## 2019-05-10 DIAGNOSIS — M25.561 ACUTE PAIN OF RIGHT KNEE: ICD-10-CM

## 2019-05-10 PROCEDURE — 97110 THERAPEUTIC EXERCISES: CPT | Mod: GP | Performed by: PHYSICAL THERAPIST

## 2019-05-10 PROCEDURE — 97112 NEUROMUSCULAR REEDUCATION: CPT | Mod: GP | Performed by: PHYSICAL THERAPIST

## 2019-05-10 PROCEDURE — 97530 THERAPEUTIC ACTIVITIES: CPT | Mod: GP | Performed by: PHYSICAL THERAPIST

## 2019-05-22 ENCOUNTER — THERAPY VISIT (OUTPATIENT)
Dept: PHYSICAL THERAPY | Facility: CLINIC | Age: 74
End: 2019-05-22
Payer: MEDICARE

## 2019-05-22 DIAGNOSIS — Z96.651 STATUS POST TOTAL RIGHT KNEE REPLACEMENT: ICD-10-CM

## 2019-05-22 DIAGNOSIS — M25.561 ACUTE PAIN OF RIGHT KNEE: ICD-10-CM

## 2019-05-22 PROCEDURE — 97112 NEUROMUSCULAR REEDUCATION: CPT | Mod: GP | Performed by: PHYSICAL THERAPIST

## 2019-05-22 PROCEDURE — 97110 THERAPEUTIC EXERCISES: CPT | Mod: GP | Performed by: PHYSICAL THERAPIST

## 2019-05-22 PROCEDURE — 97530 THERAPEUTIC ACTIVITIES: CPT | Mod: GP | Performed by: PHYSICAL THERAPIST

## 2019-05-22 ASSESSMENT — ACTIVITIES OF DAILY LIVING (ADL)
RAW_SCORE: 51
KNEEL ON THE FRONT OF YOUR KNEE: ACTIVITY IS VERY DIFFICULT
SWELLING: I DO NOT HAVE THE SYMPTOM
GIVING WAY, BUCKLING OR SHIFTING OF KNEE: I DO NOT HAVE THE SYMPTOM
PAIN: I HAVE THE SYMPTOM BUT IT DOES NOT AFFECT MY ACTIVITY
HOW_WOULD_YOU_RATE_THE_CURRENT_FUNCTION_OF_YOUR_KNEE_DURING_YOUR_USUAL_DAILY_ACTIVITIES_ON_A_SCALE_FROM_0_TO_100_WITH_100_BEING_YOUR_LEVEL_OF_KNEE_FUNCTION_PRIOR_TO_YOUR_INJURY_AND_0_BEING_THE_INABILITY_TO_PERFORM_ANY_OF_YOUR_USUAL_DAILY_ACTIVITIES?: 50
STIFFNESS: I HAVE THE SYMPTOM BUT IT DOES NOT AFFECT MY ACTIVITY
SIT WITH YOUR KNEE BENT: ACTIVITY IS NOT DIFFICULT
WEAKNESS: I HAVE THE SYMPTOM BUT IT DOES NOT AFFECT MY ACTIVITY
LIMPING: THE SYMPTOM AFFECTS MY ACTIVITY SLIGHTLY
SQUAT: ACTIVITY IS SOMEWHAT DIFFICULT
RISE FROM A CHAIR: ACTIVITY IS MINIMALLY DIFFICULT
AS_A_RESULT_OF_YOUR_KNEE_INJURY,_HOW_WOULD_YOU_RATE_YOUR_CURRENT_LEVEL_OF_DAILY_ACTIVITY?: NORMAL
GO UP STAIRS: ACTIVITY IS SOMEWHAT DIFFICULT
KNEE_ACTIVITY_OF_DAILY_LIVING_SUM: 51
KNEE_ACTIVITY_OF_DAILY_LIVING_SCORE: 72.86
GO DOWN STAIRS: ACTIVITY IS SOMEWHAT DIFFICULT
WALK: ACTIVITY IS MINIMALLY DIFFICULT
STAND: ACTIVITY IS SOMEWHAT DIFFICULT
HOW_WOULD_YOU_RATE_THE_OVERALL_FUNCTION_OF_YOUR_KNEE_DURING_YOUR_USUAL_DAILY_ACTIVITIES?: NORMAL

## 2019-06-13 PROBLEM — Z96.651 STATUS POST TOTAL RIGHT KNEE REPLACEMENT: Status: RESOLVED | Noted: 2019-02-26 | Resolved: 2019-06-13

## 2019-06-13 PROBLEM — M25.561 KNEE PAIN, RIGHT: Status: RESOLVED | Noted: 2019-02-26 | Resolved: 2019-06-13

## 2019-06-13 NOTE — PROGRESS NOTES
Subjective:  HPI       Knee Activity of Daily Living Score: 72.86            Objective:  System    Physical Exam    General     ROS    Assessment/Plan:    DISCHARGE REPORT    Progress reporting period is from 2/26/19 to 5/22/19.       SUBJECTIVE  Subjective: Pt states his knee has been stiff a little lately, but has overall been feeling good about his knee. Minimal pain today. Has been walking frequently which he feels his sxs are well regualted.    Current Pain level: 0/10.     Initial Pain level: 1/10.   Changes in function:  Yes (See Goal flowsheet attached for changes in current functional level)  Adverse reaction to treatment or activity: None    OBJECTIVE  Changes noted in objective findings:    Objective: 115 degrees R knee flexion, 120 with OP in PROM R knee flexion     ASSESSMENT/PLAN  Updated problem list and treatment plan: Diagnosis 1:  R TKA   STG/LTGs have been met or progress has been made towards goals:  Yes (See Goal flow sheet completed today.)  Assessment of Progress: The patient's condition is improving.  The patient's progress has plateaued.  Self Management Plans:  Patient is independent in a home treatment program.  Patient is independent in self management of symptoms.  I have re-evaluated this patient and find that the nature, scope, duration and intensity of the therapy is appropriate for the medical condition of the patient.  Chase continues to require the following intervention to meet STG and LTG's:  PT intervention is no longer required to meet STG/LTG.    Recommendations:  This patient is ready to be discharged from therapy and continue their home treatment program.    Please refer to the daily flowsheet for treatment today, total treatment time and time spent performing 1:1 timed codes.

## 2020-01-17 DIAGNOSIS — Z96.651 HISTORY OF TOTAL RIGHT KNEE REPLACEMENT: Primary | ICD-10-CM

## 2020-01-21 ENCOUNTER — TRANSFERRED RECORDS (OUTPATIENT)
Dept: HEALTH INFORMATION MANAGEMENT | Facility: CLINIC | Age: 75
End: 2020-01-21

## 2020-01-21 ENCOUNTER — ANCILLARY PROCEDURE (OUTPATIENT)
Dept: GENERAL RADIOLOGY | Facility: CLINIC | Age: 75
End: 2020-01-21
Attending: ORTHOPAEDIC SURGERY
Payer: MEDICARE

## 2020-01-21 ENCOUNTER — OFFICE VISIT (OUTPATIENT)
Dept: ORTHOPEDICS | Facility: CLINIC | Age: 75
End: 2020-01-21
Payer: MEDICARE

## 2020-01-21 VITALS
BODY MASS INDEX: 32.28 KG/M2 | SYSTOLIC BLOOD PRESSURE: 116 MMHG | WEIGHT: 209.2 LBS | OXYGEN SATURATION: 97 % | HEART RATE: 65 BPM | DIASTOLIC BLOOD PRESSURE: 66 MMHG

## 2020-01-21 DIAGNOSIS — M25.551 RIGHT HIP PAIN: ICD-10-CM

## 2020-01-21 DIAGNOSIS — Z96.651 HISTORY OF TOTAL RIGHT KNEE REPLACEMENT: ICD-10-CM

## 2020-01-21 DIAGNOSIS — Z47.89 ORTHOPEDIC AFTERCARE: Primary | ICD-10-CM

## 2020-01-21 PROBLEM — E11.9 DIABETES MELLITUS, TYPE 2 (H): Status: ACTIVE | Noted: 2020-01-21

## 2020-01-21 PROCEDURE — 73562 X-RAY EXAM OF KNEE 3: CPT | Mod: RT | Performed by: RADIOLOGY

## 2020-01-21 PROCEDURE — 99213 OFFICE O/P EST LOW 20 MIN: CPT | Performed by: ORTHOPAEDIC SURGERY

## 2020-01-21 RX ORDER — ROPINIROLE 0.5 MG/1
1 TABLET, FILM COATED ORAL
COMMUNITY
Start: 2019-11-13

## 2020-01-21 RX ORDER — VENLAFAXINE HYDROCHLORIDE 150 MG/1
150 CAPSULE, EXTENDED RELEASE ORAL
COMMUNITY
Start: 2019-12-11

## 2020-01-21 ASSESSMENT — PAIN SCALES - GENERAL: PAINLEVEL: NO PAIN (0)

## 2020-01-21 NOTE — LETTER
"    1/21/2020         RE: Chase Zhao  [address] [place] [address]        Dear Colleague,    Thank you for referring your patient, Chase Zhao, to the [facility]. Please see a copy of my visit note below.    Chief Complaint: Surgical Followup of the Right Knee (1 year S/p Right Total Knee Arthroplasty DOS: 01/02/2019)       HPI: Chase Zhao returns today in follow-up for his right knee. He reports that he is doing well. He rpeorts that he is not limited by his knee. He is going to the [employer] several times a week for exercise. Very happy with how his knee is doing. He is here for routine follow-up on the knee and also with c/o pain in his right hip/groin. He had radiographs of this recently.     Medications and allergies are documented in the EMR and have been reviewed.    Current Outpatient Medications:      blood glucose monitoring ([employer] TEST STRIPS) test strip, Dispense test strips covered by the patient insurance. Test 4 times per day, before meals and at bedtime., Disp: , Rfl:      Blood Glucose Monitoring Suppl (FIFTY50 GLUCOSE METER 2.0) w/Device KIT, Dispense meter, test strips, lancets covered by pt ins. E11.65 IDDM type II, uncontrolled - Test 6 times/day. Reason: Unstable diabetes, Disp: , Rfl:      cycloSPORINE (RESTASIS) 0.05 % ophthalmic emulsion, Place 1 drop into both eyes 2 times daily, Disp: , Rfl:      Insulin Lispro (HUMALOG PEN SC), 70/30 flex pen  Take 24 units in the morning and 26 units in the evening., Disp: , Rfl:      Insulin Syringe 30G X 5/16\" 1 ML MISC, Dispense item covered by patient insurance. For administering insulin at home., Disp: , Rfl:      NOVOFINE 30G X 8 MM insulin pen needle, , Disp: , Rfl:      rOPINIRole (REQUIP) 0.5 MG tablet, Take 1 mg by mouth, Disp: , Rfl:      venlafaxine (EFFEXOR-XR) 150 MG 24 hr capsule, Take 150 mg by mouth, Disp: , Rfl:      citalopram (CELEXA) 40 MG tablet, Take 1 tablet (40 mg) by mouth daily, Disp: , " Rfl:      clonazePAM (KLONOPIN) 1 MG tablet, Take 1 tablet (1 mg) by mouth At Bedtime, Disp: 30 tablet, Rfl: 0     JANTOVEN 5 MG tablet, Take 1-1.5 tablets (5-7.5 mg) by mouth daily 1 to 1.5 tablets daily, Disp: , Rfl:      liraglutide (VICTOZA PEN) 18 MG/3ML solution, Inject 1.8 mg Subcutaneous daily, Disp: , Rfl:      lisinopril (PRINIVIL/ZESTRIL) 5 MG tablet, Take 1 tablet (5 mg) by mouth daily, Disp: 30 tablet, Rfl: 0     metoprolol tartrate (LOPRESSOR) 25 MG tablet, Take 1 tablet (25 mg) by mouth 2 times daily, Disp: , Rfl:      nitroGLYcerin (NITROSTAT) 0.4 MG sublingual tablet, Place 1 tablet (0.4 mg) under the tongue every 5 minutes as needed, Disp: , Rfl:      rosuvastatin (CRESTOR) 40 MG tablet, Take 1 tablet (40 mg) by mouth At Bedtime, Disp: 30 tablet, Rfl: 0     warfarin (COUMADIN) 10 MG tablet, Take 1 tablet (10 mg) by mouth daily, Disp: 30 tablet, Rfl: 0  Allergies: Patient has no known allergies.    Physical Exam:  On physical examination the patient appears the stated age, is in no acute distress, affect is appropriate, and breathing is non-labored.  /66 (BP Location: Left arm, Patient Position: Sitting, Cuff Size: Adult Large)   Pulse 65   Wt 94.9 kg (209 lb 3.2 oz)   SpO2 97%   BMI 32.28 kg/m     Body mass index is 32.28 kg/m .  Knee ROM is 0-115   Right hip ROM is a little irritated and with IRF he reports groin pain    X-rays:    I reviewed the x-rays dated today.  Previous films reviewed.    Findings:  Normal progression for a total knee arthroplasty without evidence of loosening or subsidence.  Right hip with early to moderate OA    Assessment: doing well one year s/p total knee. Right hip early to moderate OA.   Plan: RTC in 5 years for the knee, sooner if issues. For the hip discussed intra-articular steroid injection. This has been scheduled.     No ref. provider found      Again, thank you for allowing me to participate in the care of your patient.         Sincerely,        Marco Cruz MD

## 2020-01-21 NOTE — NURSING NOTE
Chase Zhao's goals for this visit include: RETURN  Chief Complaint   Patient presents with     Right Knee - Surgical Followup     1 year S/p Right Total Knee Arthroplasty DOS: 01/02/2019       He requests these members of his care team be copied on today's visit information:     PCP: Madhu Miller    Referring Provider:  No referring provider defined for this encounter.    /66 (BP Location: Left arm, Patient Position: Sitting, Cuff Size: Adult Large)   Pulse 65   Wt 94.9 kg (209 lb 3.2 oz)   SpO2 97%   BMI 32.28 kg/m      Do you need any medication refills at today's visit? NO    Yulissa WILLSON, CMA

## 2020-01-21 NOTE — PROGRESS NOTES
"Chief Complaint: Surgical Followup of the Right Knee (1 year S/p Right Total Knee Arthroplasty DOS: 01/02/2019)       HPI: Chase Zhao returns today in follow-up for his right knee. He reports that he is doing well. He rpeorts that he is not limited by his knee. [facility]. Very happy with how his knee is doing. He is here for routine follow-up on the knee and also with c/o pain in his right hip/groin. He had radiographs of this recently.     Medications and allergies are documented in the EMR and have been reviewed.    Current Outpatient Medications:      blood glucose monitoring ([employer] TEST STRIPS) test strip, Dispense test strips covered by the patient insurance. Test 4 times per day, before meals and at bedtime., Disp: , Rfl:      Blood Glucose Monitoring Suppl (FIFTY50 GLUCOSE METER 2.0) w/Device KIT, Dispense meter, test strips, lancets covered by pt ins. E11.65 IDDM type II, uncontrolled - Test 6 times/day. Reason: Unstable diabetes, Disp: , Rfl:      cycloSPORINE (RESTASIS) 0.05 % ophthalmic emulsion, Place 1 drop into both eyes 2 times daily, Disp: , Rfl:      Insulin Lispro (HUMALOG PEN SC), 70/30 flex pen  Take 24 units in the morning and 26 units in the evening., Disp: , Rfl:      Insulin Syringe 30G X 5/16\" 1 ML MISC, Dispense item covered by patient insurance. For administering insulin at home., Disp: , Rfl:      NOVOFINE 30G X 8 MM insulin pen needle, , Disp: , Rfl:      rOPINIRole (REQUIP) 0.5 MG tablet, Take 1 mg by mouth, Disp: , Rfl:      venlafaxine (EFFEXOR-XR) 150 MG 24 hr capsule, Take 150 mg by mouth, Disp: , Rfl:      citalopram (CELEXA) 40 MG tablet, Take 1 tablet (40 mg) by mouth daily, Disp: , Rfl:      clonazePAM (KLONOPIN) 1 MG tablet, Take 1 tablet (1 mg) by mouth At Bedtime, Disp: 30 tablet, Rfl: 0     JANTOVEN 5 MG tablet, Take 1-1.5 tablets (5-7.5 mg) by mouth daily 1 to 1.5 tablets daily, Disp: , Rfl:      liraglutide (VICTOZA " PEN) 18 MG/3ML solution, Inject 1.8 mg Subcutaneous daily, Disp: , Rfl:      lisinopril (PRINIVIL/ZESTRIL) 5 MG tablet, Take 1 tablet (5 mg) by mouth daily, Disp: 30 tablet, Rfl: 0     metoprolol tartrate (LOPRESSOR) 25 MG tablet, Take 1 tablet (25 mg) by mouth 2 times daily, Disp: , Rfl:      nitroGLYcerin (NITROSTAT) 0.4 MG sublingual tablet, Place 1 tablet (0.4 mg) under the tongue every 5 minutes as needed, Disp: , Rfl:      rosuvastatin (CRESTOR) 40 MG tablet, Take 1 tablet (40 mg) by mouth At Bedtime, Disp: 30 tablet, Rfl: 0     warfarin (COUMADIN) 10 MG tablet, Take 1 tablet (10 mg) by mouth daily, Disp: 30 tablet, Rfl: 0  Allergies: Patient has no known allergies.    Physical Exam:  On physical examination the patient appears the stated age, is in no acute distress, affect is appropriate, and breathing is non-labored.  /66 (BP Location: Left arm, Patient Position: Sitting, Cuff Size: Adult Large)   Pulse 65   Wt 94.9 kg (209 lb 3.2 oz)   SpO2 97%   BMI 32.28 kg/m    Body mass index is 32.28 kg/m .  Knee ROM is 0-115   Right hip ROM is a little irritated and with IRF he reports groin pain    X-rays:    I reviewed the x-rays dated today.  Previous films reviewed.    Findings:  Normal progression for a total knee arthroplasty without evidence of loosening or subsidence.  Right hip with early to moderate OA    Assessment: doing well one year s/p total knee. Right hip early to moderate OA.   Plan: RTC in 5 years for the knee, sooner if issues. For the hip discussed intra-articular steroid injection. This has been scheduled.     No ref. provider found

## 2020-02-05 ENCOUNTER — OFFICE VISIT (OUTPATIENT)
Dept: ORTHOPEDICS | Facility: CLINIC | Age: 75
End: 2020-02-05
Payer: MEDICARE

## 2020-02-05 VITALS — DIASTOLIC BLOOD PRESSURE: 70 MMHG | WEIGHT: 209 LBS | BODY MASS INDEX: 32.25 KG/M2 | SYSTOLIC BLOOD PRESSURE: 117 MMHG

## 2020-02-05 DIAGNOSIS — M16.11 PRIMARY OSTEOARTHRITIS OF RIGHT HIP: Primary | ICD-10-CM

## 2020-02-05 PROCEDURE — 99207 ZZC DROP WITH A PROCEDURE: CPT | Performed by: FAMILY MEDICINE

## 2020-02-05 PROCEDURE — 20611 DRAIN/INJ JOINT/BURSA W/US: CPT | Mod: RT | Performed by: FAMILY MEDICINE

## 2020-02-05 RX ORDER — TRIAMCINOLONE ACETONIDE 40 MG/ML
40 INJECTION, SUSPENSION INTRA-ARTICULAR; INTRAMUSCULAR
Status: SHIPPED | OUTPATIENT
Start: 2020-02-05

## 2020-02-05 RX ADMIN — TRIAMCINOLONE ACETONIDE 40 MG: 40 INJECTION, SUSPENSION INTRA-ARTICULAR; INTRAMUSCULAR at 14:41

## 2020-02-05 NOTE — LETTER
2/5/2020         RE: Chase Zhao  [address] [place] [address]        Dear Colleague,    Thank you for referring your patient, Chase Zhao, to the [facility]. Please see a copy of my visit note below.    Chase is here for a right hip injection, he's seen at the request of Dr. Cruz.    Vitals:    02/05/20 1409   BP: 117/70   Weight: 94.8 kg (209 lb)     Intraarticular Hip Injection - Ultrasound Guided  The patient was informed of the risks and the benefits of the procedure and a written consent was signed.  The patient s right hip was prepped with chlorhexidine in sterile fashion.   40 mg of triamcinolone suspension was drawn up into a 5 mL syringe with 4 mL of 1% lidocaine.  Injection was performed using sterile technique.  Under ultrasound guidance a 3.5-inch 25-gauge needle was used to enter the right femoracetabular joint.  Anterior approach was used, needle placement was visualized and documented with ultrasound.  Ultrasound visualization was necessary due to decreased joint space in the setting of osteoarthritis.  Injection performed long axis to the probe.  Injection solution visualized within the joint space.  Images were permanently stored for the patient's record.  There were no complications. The patient tolerated the procedure well. There was negligible bleeding.   The patient was instructed to ice the hip upon leaving clinic and refrain from overuse over the next 3 days.   The patient was instructed to call or go to the emergency room with any unusual pain, swelling, redness, or if otherwise concerned.    Holger Carbajal DO [facility]        Chase Zhao's chief complaint for this visit includes:  Chief Complaint   Patient presents with     Consult     Right hip injection, ref Dr. Carbajal      PCP: Madhu Miller    Referring Provider:  No referring provider defined for this encounter.    /70   Wt 94.8 kg (209 lb)   BMI 32.25 kg/m     Data Unavailable     Do you  need any medication refills at today's visit? No         Large Joint Injection/Arthocentesis: R hip joint  Date/Time: 2/5/2020 2:41 PM  Performed by: Harry Carbajal DO  Authorized by: Harry Carbajal DO     Indications:  Pain  Needle Size:  22 G  Guidance: ultrasound    Approach:  Anterior  Location:  Hip      Site:  R hip joint  Medications:  40 mg triamcinolone 40 MG/ML  Outcome:  Tolerated well, no immediate complications  Procedure discussed: discussed risks, benefits, and alternatives    Consent Given by:  Patient  Timeout: timeout called immediately prior to procedure    Prep: patient was prepped and draped in usual sterile fashion            Again, thank you for allowing me to participate in the care of your patient.        Sincerely,        Harry Carbajal DO

## 2020-02-05 NOTE — PROGRESS NOTES
Large Joint Injection/Arthocentesis: R hip joint  Date/Time: 2/5/2020 2:41 PM  Performed by: Harry Carbajal DO  Authorized by: Harry Carbajal DO     Indications:  Pain  Needle Size:  22 G  Guidance: ultrasound    Approach:  Anterior  Location:  Hip      Site:  R hip joint  Medications:  40 mg triamcinolone 40 MG/ML  Outcome:  Tolerated well, no immediate complications  Procedure discussed: discussed risks, benefits, and alternatives    Consent Given by:  Patient  Timeout: timeout called immediately prior to procedure    Prep: patient was prepped and draped in usual sterile fashion

## 2020-02-05 NOTE — PROGRESS NOTES
Chase is here for a right hip injection, he's seen at the request of Dr. Cruz.    Vitals:    02/05/20 1409   BP: 117/70   Weight: 94.8 kg (209 lb)     Intraarticular Hip Injection - Ultrasound Guided  The patient was informed of the risks and the benefits of the procedure and a written consent was signed.  The patient s right hip was prepped with chlorhexidine in sterile fashion.   40 mg of triamcinolone suspension was drawn up into a 5 mL syringe with 4 mL of 1% lidocaine.  Injection was performed using sterile technique.  Under ultrasound guidance a 3.5-inch 25-gauge needle was used to enter the right femoracetabular joint.  Anterior approach was used, needle placement was visualized and documented with ultrasound.  Ultrasound visualization was necessary due to decreased joint space in the setting of osteoarthritis.  Injection performed long axis to the probe.  Injection solution visualized within the joint space.  Images were permanently stored for the patient's record.  There were no complications. The patient tolerated the procedure well. There was negligible bleeding.   The patient was instructed to ice the hip upon leaving clinic and refrain from overuse over the next 3 days.   The patient was instructed to call or go to the emergency room with any unusual pain, swelling, redness, or if otherwise concerned.    Holgre Carbajal DO [facility]        Chasebillie Zhao's chief complaint for this visit includes:  Chief Complaint   Patient presents with     Consult     Right hip injection, ref Dr. Carbajal      PCP: Madhu Miller    Referring Provider:  No referring provider defined for this encounter.    /70   Wt 94.8 kg (209 lb)   BMI 32.25 kg/m    Data Unavailable     Do you need any medication refills at today's visit? No

## 2020-02-26 ENCOUNTER — OFFICE VISIT (OUTPATIENT)
Dept: ORTHOPEDICS | Facility: CLINIC | Age: 75
End: 2020-02-26
Payer: MEDICARE

## 2020-02-26 VITALS — BODY MASS INDEX: 32.25 KG/M2 | WEIGHT: 209 LBS

## 2020-02-26 DIAGNOSIS — M25.551 GREATER TROCHANTERIC PAIN SYNDROME OF RIGHT LOWER EXTREMITY: Primary | ICD-10-CM

## 2020-02-26 PROCEDURE — 20611 DRAIN/INJ JOINT/BURSA W/US: CPT | Mod: RT | Performed by: FAMILY MEDICINE

## 2020-02-26 PROCEDURE — 99207 ZZC DROP WITH A PROCEDURE: CPT | Performed by: FAMILY MEDICINE

## 2020-02-26 RX ORDER — TRIAMCINOLONE ACETONIDE 40 MG/ML
40 INJECTION, SUSPENSION INTRA-ARTICULAR; INTRAMUSCULAR
Status: SHIPPED | OUTPATIENT
Start: 2020-02-26

## 2020-02-26 RX ORDER — ASPIRIN 81 MG/1
81 TABLET ORAL DAILY
COMMUNITY

## 2020-02-26 RX ORDER — OXYBUTYNIN CHLORIDE 5 MG/1
5 TABLET, EXTENDED RELEASE ORAL
COMMUNITY
Start: 2019-07-01

## 2020-02-26 RX ORDER — CHLORAL HYDRATE 500 MG
1000 CAPSULE ORAL
COMMUNITY
Start: 2017-06-06

## 2020-02-26 RX ADMIN — TRIAMCINOLONE ACETONIDE 40 MG: 40 INJECTION, SUSPENSION INTRA-ARTICULAR; INTRAMUSCULAR at 15:48

## 2020-02-26 NOTE — LETTER
2/26/2020         RE: Chase Zhao  [address] [place] [address]        Dear Colleague,    Thank you for referring your patient, Chase Zhao, to the [facility]. Please see a copy of my visit note below.    HISTORY OF PRESENT ILLNESS  Mr. Zhao is a pleasant 75 year old male following up with right hip osteoarthritis.  Chase saw me on February 5, I injected his right hip joint with corticosteroid at that visit.  This injection helped him somewhat for a day or 2.  He's interested in a lateral hip injection.    ASSESSMENT & PLAN  Mr. Zhao is a 75 year old male following up with right hip pain.    I did inject his lateral hip today (see procedure note).    If this injection doesn't help him I'd likely order an MRI.    It was a pleasure seeing Chase.    Trochanteric Hip Injection - Ultrasound Guided  The patient was informed of the risks and the benefits of the procedure and a written consent was signed.  The patient s lateral right hip was prepped with chlorhexidine in sterile fashion.   40 mg of triamcinolone suspension was drawn up into a 5 mL syringe with 4 mL of 1%.  Injection was performed using sterile technique.  Under ultrasound guidance a 3.5-inch 25-gauge needle was used to enter the elias-trochanteric tissue.  Needle placement was visualized and documented with ultrasound which was deemed necessary to ensure medication did not enter the tendon itself, which could potentially cause tendon damage.   Injection performed long axis to the probe with probe in short axis to the greater trochanter.  Expansion of the elias-trochanteric tissue was visualized under ultrasound upon injection.  Images were permanently stored for the patient's record.  There were no complications. The patient tolerated the procedure well. There was negligible bleeding.   The patient was instructed to ice the hip upon leaving clinic and refrain from overuse over the next 3 days.   The patient was  instructed to call or go to the emergency room with any unusual pain, swelling, redness, or if otherwise concerned.          Harry Carbajal DO, CAQSM      This note was constructed using Dragon dictation software, please excuse any minor errors in spelling, grammar, or syntax.          [facility] FOLLOW-UP VISIT 2/26/2020    Chase Zhao's chief complaint for this visit includes:  Chief Complaint   Patient presents with     RECHECK     right hip cortisone injection 2/5/20 felt great for a few days then pain has returned.      PCP: Madhu Miller    Referring Provider:  No referring provider defined for this encounter.    Wt 94.8 kg (209 lb)   BMI 32.25 kg/m     Data Unavailable       Interval History:     Follow up reason: right hip     Medical History:    Any recent changes to your medical history? No    Any new medication prescribed since last visit? No    Review of Systems:    Do you have fever, chills, weight loss? No    Do you have any vision problems? No    Do you have any chest pain or edema? Yes, following up with PCP     Do you have any shortness of breath or wheezing?  No    Do you have stomach problems? No    Do you have any numbness or focal weakness? No    Do you have diabetes? Yes,     Do you have problems with bleeding or clotting? No    Do you have an rashes or other skin lesions? No  Large Joint Injection/Arthocentesis: R greater trochanteric bursa  Date/Time: 2/26/2020 3:48 PM  Performed by: Harry Carbajal DO  Authorized by: Harry Carbajal DO     Indications:  Pain  Needle Size:  22 G  Guidance: ultrasound    Approach:  Anterior  Location:  Hip      Site:  R greater trochanteric bursa  Medications:  40 mg triamcinolone 40 MG/ML  Outcome:  Tolerated well, no immediate complications  Procedure discussed: discussed risks, benefits, and alternatives    Consent Given by:  Patient  Timeout: timeout called immediately prior to procedure    Prep: patient was prepped and draped in  usual sterile fashion            Again, thank you for allowing me to participate in the care of your patient.        Sincerely,        Harry Carbajal, DO

## 2020-02-26 NOTE — PROGRESS NOTES
HISTORY OF PRESENT ILLNESS  Mr. Zhao is a pleasant 75 year old male following up with right hip osteoarthritis.  Chase saw me on February 5, I injected his right hip joint with corticosteroid at that visit.  This injection helped him somewhat for a day or 2.  He's interested in a lateral hip injection.    ASSESSMENT & PLAN  Mr. Zhao is a 75 year old male following up with right hip pain.    I did inject his lateral hip today (see procedure note).    If this injection doesn't help him I'd likely order an MRI.    It was a pleasure seeing hCase.    Trochanteric Hip Injection - Ultrasound Guided  The patient was informed of the risks and the benefits of the procedure and a written consent was signed.  The patient s lateral right hip was prepped with chlorhexidine in sterile fashion.   40 mg of triamcinolone suspension was drawn up into a 5 mL syringe with 4 mL of 1%.  Injection was performed using sterile technique.  Under ultrasound guidance a 3.5-inch 25-gauge needle was used to enter the elias-trochanteric tissue.  Needle placement was visualized and documented with ultrasound which was deemed necessary to ensure medication did not enter the tendon itself, which could potentially cause tendon damage.   Injection performed long axis to the probe with probe in short axis to the greater trochanter.  Expansion of the elias-trochanteric tissue was visualized under ultrasound upon injection.  Images were permanently stored for the patient's record.  There were no complications. The patient tolerated the procedure well. There was negligible bleeding.   The patient was instructed to ice the hip upon leaving clinic and refrain from overuse over the next 3 days.   The patient was instructed to call or go to the emergency room with any unusual pain, swelling, redness, or if otherwise concerned.          Harry Carbajal DO, CAQSM      This note was constructed using Dragon dictation software, please excuse any minor errors in  spelling, grammar, or syntax.          [facility] FOLLOW-UP VISIT 2/26/2020    Chase Zhao's chief complaint for this visit includes:  Chief Complaint   Patient presents with     RECHECK     right hip cortisone injection 2/5/20 felt great for a few days then pain has returned.      PCP: Madhu Miller    Referring Provider:  No referring provider defined for this encounter.    Wt 94.8 kg (209 lb)   BMI 32.25 kg/m    Data Unavailable       Interval History:     Follow up reason: right hip     Medical History:    Any recent changes to your medical history? No    Any new medication prescribed since last visit? No    Review of Systems:    Do you have fever, chills, weight loss? No    Do you have any vision problems? No    Do you have any chest pain or edema? Yes, following up with PCP     Do you have any shortness of breath or wheezing?  No    Do you have stomach problems? No    Do you have any numbness or focal weakness? No    Do you have diabetes? Yes,     Do you have problems with bleeding or clotting? No    Do you have an rashes or other skin lesions? No  Large Joint Injection/Arthocentesis: R greater trochanteric bursa  Date/Time: 2/26/2020 3:48 PM  Performed by: Harry Carbajal DO  Authorized by: Harry Carbajal DO     Indications:  Pain  Needle Size:  22 G  Guidance: ultrasound    Approach:  Anterior  Location:  Hip      Site:  R greater trochanteric bursa  Medications:  40 mg triamcinolone 40 MG/ML  Outcome:  Tolerated well, no immediate complications  Procedure discussed: discussed risks, benefits, and alternatives    Consent Given by:  Patient  Timeout: timeout called immediately prior to procedure    Prep: patient was prepped and draped in usual sterile fashion

## 2021-02-19 ENCOUNTER — PRE VISIT (OUTPATIENT)
Dept: ORTHOPEDICS | Facility: CLINIC | Age: 76
End: 2021-02-19

## 2021-02-19 DIAGNOSIS — M25.552 LEFT HIP PAIN: Primary | ICD-10-CM

## 2021-02-19 NOTE — TELEPHONE ENCOUNTER
"PREVISIT INFORMATION                                                    Chase Zhao scheduled for future visit at [facility].    Pain all the time and when he sits.     Called and requested implant stickers and op note from 2011 surgery.     Patient is scheduled to see Dr. Cruz on 3/2/21  Reason for visit: Left hip pain, revision 11/10/2011 [facility]  Referring provider   Has patient seen previous specialist? XR shows previous left hip surgery  Medical Records:  unknown    REVIEW                                                      New patient packet mailed to patient: Yes  Medication reconciliation complete: Yes      Current Outpatient Medications   Medication Sig Dispense Refill     aspirin 81 MG EC tablet Take 81 mg by mouth daily       blood glucose monitoring ([employer] TEST STRIPS) test strip Dispense test strips covered by the patient insurance. Test 4 times per day, before meals and at bedtime.       Blood Glucose Monitoring Suppl (FIFTY50 GLUCOSE METER 2.0) w/Device KIT Dispense meter, test strips, lancets covered by pt ins. E11.65 IDDM type II, uncontrolled - Test 6 times/day. Reason: Unstable diabetes       calcium citrate-vitamin D (CITRACAL) 315-200 MG-UNIT TABS per tablet Take 1 tablet by mouth 2 times daily       cycloSPORINE (RESTASIS) 0.05 % ophthalmic emulsion Place 1 drop into both eyes 2 times daily       Insulin Lispro (HUMALOG PEN SC) 70/30 flex pen  Take 24 units in the morning and 26 units in the evening.       Insulin Syringe 30G X 5/16\" 1 ML MISC Dispense item covered by patient insurance. For administering insulin at home.       liraglutide (VICTOZA PEN) 18 MG/3ML solution Inject 1.8 mg Subcutaneous daily       lisinopril (PRINIVIL/ZESTRIL) 5 MG tablet Take 1 tablet (5 mg) by mouth daily 30 tablet 0     metoprolol tartrate (LOPRESSOR) 25 MG tablet Take 1 tablet (25 mg) by mouth 2 times daily       nitroGLYcerin (NITROSTAT) 0.4 MG sublingual tablet Place 1 tablet " (0.4 mg) under the tongue every 5 minutes as needed       NOVOFINE 30G X 8 MM insulin pen needle        Omega-3 1000 MG capsule Take 1,000 mg by mouth       omeprazole (PRILOSEC) 20 MG DR capsule Take 20 mg by mouth       oxybutynin ER (DITROPAN-XL) 5 MG 24 hr tablet Take 5 mg by mouth       rOPINIRole (REQUIP) 0.5 MG tablet Take 1 mg by mouth       rosuvastatin (CRESTOR) 40 MG tablet Take 1 tablet (40 mg) by mouth At Bedtime 30 tablet 0     venlafaxine (EFFEXOR-XR) 150 MG 24 hr capsule Take 150 mg by mouth       warfarin (COUMADIN) 10 MG tablet Take 1 tablet (10 mg) by mouth daily (Patient taking differently: Take 5 mg by mouth daily ) 30 tablet 0       Allergies: Patient has no known allergies.  XR from 1/21/20, will get updated.    PLAN/FOLLOW-UP NEEDED                                                      Previsit review complete.  Patient will see provider at future scheduled appointment.     Patient Reminders Given:  Please, make sure you bring an updated list of your medications.   If you are having a procedure, please, present 15 minutes early.  If you need to cancel or reschedule,please call [phone].    Jenn Alexandra RN

## 2021-03-02 ENCOUNTER — OFFICE VISIT (OUTPATIENT)
Dept: ORTHOPEDICS | Facility: CLINIC | Age: 76
End: 2021-03-02
Payer: MEDICARE

## 2021-03-02 ENCOUNTER — ANCILLARY PROCEDURE (OUTPATIENT)
Dept: GENERAL RADIOLOGY | Facility: CLINIC | Age: 76
End: 2021-03-02
Attending: ORTHOPAEDIC SURGERY
Payer: MEDICARE

## 2021-03-02 ENCOUNTER — TELEPHONE (OUTPATIENT)
Dept: ORTHOPEDICS | Facility: CLINIC | Age: 76
End: 2021-03-02

## 2021-03-02 VITALS — HEIGHT: 69 IN | BODY MASS INDEX: 31.55 KG/M2 | WEIGHT: 213 LBS

## 2021-03-02 DIAGNOSIS — M25.552 LEFT HIP PAIN: ICD-10-CM

## 2021-03-02 DIAGNOSIS — M25.552 LEFT HIP PAIN: Primary | ICD-10-CM

## 2021-03-02 PROCEDURE — 99213 OFFICE O/P EST LOW 20 MIN: CPT | Performed by: ORTHOPAEDIC SURGERY

## 2021-03-02 PROCEDURE — 73521 X-RAY EXAM HIPS BI 2 VIEWS: CPT | Performed by: RADIOLOGY

## 2021-03-02 PROCEDURE — 72100 X-RAY EXAM L-S SPINE 2/3 VWS: CPT | Performed by: RADIOLOGY

## 2021-03-02 ASSESSMENT — MIFFLIN-ST. JEOR: SCORE: 1668.66

## 2021-03-02 NOTE — PROGRESS NOTES
Assessment: This is a 76 year old with buttock pain and low back pain. His hip implant is well-fixed and there are obvious degenerative changes in both sacroiliac joints which could explain his buttock symptoms. Alternatively, there is also the suggestion of a rotatory degenerative scoliosis in the portion of the lumbar spine visible on the AP pelvis. He reports that instead of living with it he would like to pursue the diagnosis with his goal of being identifying a location that might be injected to help with the symptoms.     Plan:  Will obtain lumbar spine radiographs. He is going to follow-up in sports clinic to review and perhaps consider SI joint versus LESI.     Chief Complaint: Consult of the Left Hip      Physician:  No ref. provider found    HPI: Chase Zhao is a 76 year old male who presents today for evaluation of    Symptom Profile  Location of symptoms:  Middle  Of the left buttock  Onset: insidious   Trend: getting a little better   Duration of symptoms: 25 years   Quality of symptoms: aching, sharp/stabbing  Severity: severe  Alleviate:activity modification, standing, changing position  Exacerbating: sitting too long   Previous Treatments: Previous treatments include activity modification, oral pain medication    MEDICAL HISTORY:   Past Medical History:   Diagnosis Date     Depression      Depressive disorder      Diabetes (H)      Hearing loss     wears hearing aid     History of blood clots      History of stroke      Hypertension      Stented coronary artery        Medications:     Current Outpatient Medications:      aspirin 81 MG EC tablet, Take 81 mg by mouth daily, Disp: , Rfl:      blood glucose monitoring ([employer] TEST STRIPS) test strip, Dispense test strips covered by the patient insurance. Test 4 times per day, before meals and at bedtime., Disp: , Rfl:      Blood Glucose Monitoring Suppl (FIFTY50 GLUCOSE METER 2.0) w/Device KIT, Dispense meter, test strips, lancets covered by pt ins.  "E11.65 IDDM type II, uncontrolled - Test 6 times/day. Reason: Unstable diabetes, Disp: , Rfl:      calcium citrate-vitamin D (CITRACAL) 315-200 MG-UNIT TABS per tablet, Take 1 tablet by mouth 2 times daily, Disp: , Rfl:      cycloSPORINE (RESTASIS) 0.05 % ophthalmic emulsion, Place 1 drop into both eyes 2 times daily, Disp: , Rfl:      Insulin Lispro (HUMALOG PEN SC), 70/30 flex pen  Take 24 units in the morning and 26 units in the evening., Disp: , Rfl:      Insulin Syringe 30G X 5/16\" 1 ML MISC, Dispense item covered by patient insurance. For administering insulin at home., Disp: , Rfl:      NOVOFINE 30G X 8 MM insulin pen needle, , Disp: , Rfl:      Omega-3 1000 MG capsule, Take 1,000 mg by mouth, Disp: , Rfl:      omeprazole (PRILOSEC) 20 MG DR capsule, Take 20 mg by mouth, Disp: , Rfl:      oxybutynin ER (DITROPAN-XL) 5 MG 24 hr tablet, Take 5 mg by mouth, Disp: , Rfl:      rOPINIRole (REQUIP) 0.5 MG tablet, Take 1 mg by mouth, Disp: , Rfl:      venlafaxine (EFFEXOR-XR) 150 MG 24 hr capsule, Take 150 mg by mouth, Disp: , Rfl:      liraglutide (VICTOZA PEN) 18 MG/3ML solution, Inject 1.8 mg Subcutaneous daily, Disp: , Rfl:      lisinopril (PRINIVIL/ZESTRIL) 5 MG tablet, Take 1 tablet (5 mg) by mouth daily, Disp: 30 tablet, Rfl: 0     metoprolol tartrate (LOPRESSOR) 25 MG tablet, Take 1 tablet (25 mg) by mouth 2 times daily, Disp: , Rfl:      nitroGLYcerin (NITROSTAT) 0.4 MG sublingual tablet, Place 1 tablet (0.4 mg) under the tongue every 5 minutes as needed, Disp: , Rfl:      rosuvastatin (CRESTOR) 40 MG tablet, Take 1 tablet (40 mg) by mouth At Bedtime, Disp: 30 tablet, Rfl: 0     warfarin (COUMADIN) 10 MG tablet, Take 1 tablet (10 mg) by mouth daily (Patient taking differently: Take 5 mg by mouth daily ), Disp: 30 tablet, Rfl: 0    Current Facility-Administered Medications:      triamcinolone (KENALOG-40) injection 40 mg, 40 mg, , , Harry Carbajal, , 40 mg at 02/26/20 1548     triamcinolone (KENALOG-40) " "injection 40 mg, 40 mg, , , Harry Carbjaal, DO, 40 mg at 02/05/20 1441    Allergies: Patient has no known allergies.    SURGICAL HISTORY:   Past Surgical History:   Procedure Laterality Date     ARTHROPLASTY KNEE Right 1/2/2019    Procedure: Right  Total Knee Arthroplasty;  Surgeon: Marco Cruz MD;  Location: [facility]     ARTHROSCOPY KNEE Right 2000     AS REVISE TOTAL HIP REPLACEMENT       CA ANESTH DX WRIST ARTHROSCOPY       CARDIAC SURGERY      coronary artery bypass     ENT SURGERY       EYE SURGERY       history of gallbladder surgery       history of heart valve replacement       history of hernia repair       history of peripheral vascular replacement surgery         FAMILY HISTORY: No family history on file.    SOCIAL HISTORY:   Social History     Tobacco Use     Smoking status: Never Smoker     Smokeless tobacco: Never Used   Substance Use Topics     Alcohol use: Not on file       REVIEW OF SYSTEMS:  The comprehensive review of systems from the intake form was reviewed with the patient.  No fever, weight change or fatigue. No dry eyes. No oral ulcers, sore throat or voice change. No palpitations, syncope, angina or edema.  No chest pain, excessive sleepiness, shortness of breath or hemoptysis.   No abdominal pain, nausea, vomiting, diarrhea or heartburn.  No skin rash. No focal weakness or numbness. No bleeding or lymphadenopathy. No rhinitis or hives.     Exam:  On physical examination the patient appears the stated age, is in no acute distress, affectThe is appropriate, and breathing is non-labored.  Vitals are documented in the EMR and have been reviewed:    Ht 1.74 m (5' 8.5\")   Wt 96.6 kg (213 lb)   BMI 31.91 kg/m    5' 8.504\"  Body mass index is 31.91 kg/m .  Rises from a chair slowly, gait is without a limp, slow, uses cane. Left hip ROM is fluid and painless. Not TTP at the trochanter. Not TTP at the 5/5 strength in the hip flexors, quads.     Distally, the circulatory, motor, and sensation " exam is intact with 5/5 EHL, gastroc-soleus, and tibialis anterior.  Sensation to light touch is intact.  Dorsalis pedis and posterior tibialis pulses are palpable.  There are no sores on the feet, no bruising, and no lymphedema.    X-rays:   Well fixed left total hip. You can see the stem fairly well on the 2008 angiogram and there is clearly no change in the stem position over time. AP pelvis shows advanced sacroiliac degenerative changes and suggestion of degenerative changes in the lumbar spine

## 2021-03-02 NOTE — TELEPHONE ENCOUNTER
3/2 Provided phone number [phone] to schedule  An Appointment with sports med for spine.     Omayra Stoddard   Procedure    Ortho/Sports Med/Pod/Ent/Eye  [facility]   [phone]

## 2021-03-02 NOTE — LETTER
3/2/2021         RE: Chase Zhao  [address] [place] [address]        Dear Colleague,    Thank you for referring your patient, Chase Zhao, to the [facility]. Please see a copy of my visit note below.    Assessment: This is a 76 year old with buttock pain and low back pain. His hip implant is well-fixed and there are obvious degenerative changes in both sacroiliac joints which could explain his buttock symptoms. Alternatively, there is also the suggestion of a rotatory degenerative scoliosis in the portion of the lumbar spine visible on the AP pelvis. He reports that instead of living with it he would like to pursue the diagnosis with his goal of being identifying a location that might be injected to help with the symptoms.     Plan:  Will obtain lumbar spine radiographs. He is going to follow-up in sports clinic to review and perhaps consider SI joint versus LESI.     Chief Complaint: Consult of the Left Hip      Physician:  No ref. provider found    HPI: Chase Zhao is a 76 year old male who presents today for evaluation of    Symptom Profile  Location of symptoms:  Middle  Of the left buttock  Onset: insidious   Trend: getting a little better   Duration of symptoms: 25 years   Quality of symptoms: aching, sharp/stabbing  Severity: severe  Alleviate:activity modification, standing, changing position  Exacerbating: sitting too long   Previous Treatments: Previous treatments include activity modification, oral pain medication    MEDICAL HISTORY:   Past Medical History:   Diagnosis Date     Depression      Depressive disorder      Diabetes (H)      Hearing loss     wears hearing aid     History of blood clots      History of stroke      Hypertension      Stented coronary artery        Medications:     Current Outpatient Medications:      aspirin 81 MG EC tablet, Take 81 mg by mouth daily, Disp: , Rfl:      blood glucose monitoring ([employer] TEST STRIPS) test strip,  "Dispense test strips covered by the patient insurance. Test 4 times per day, before meals and at bedtime., Disp: , Rfl:      Blood Glucose Monitoring Suppl (FIFTY50 GLUCOSE METER 2.0) w/Device KIT, Dispense meter, test strips, lancets covered by pt ins. E11.65 IDDM type II, uncontrolled - Test 6 times/day. Reason: Unstable diabetes, Disp: , Rfl:      calcium citrate-vitamin D (CITRACAL) 315-200 MG-UNIT TABS per tablet, Take 1 tablet by mouth 2 times daily, Disp: , Rfl:      cycloSPORINE (RESTASIS) 0.05 % ophthalmic emulsion, Place 1 drop into both eyes 2 times daily, Disp: , Rfl:      Insulin Lispro (HUMALOG PEN SC), 70/30 flex pen  Take 24 units in the morning and 26 units in the evening., Disp: , Rfl:      Insulin Syringe 30G X 5/16\" 1 ML MISC, Dispense item covered by patient insurance. For administering insulin at home., Disp: , Rfl:      NOVOFINE 30G X 8 MM insulin pen needle, , Disp: , Rfl:      Omega-3 1000 MG capsule, Take 1,000 mg by mouth, Disp: , Rfl:      omeprazole (PRILOSEC) 20 MG DR capsule, Take 20 mg by mouth, Disp: , Rfl:      oxybutynin ER (DITROPAN-XL) 5 MG 24 hr tablet, Take 5 mg by mouth, Disp: , Rfl:      rOPINIRole (REQUIP) 0.5 MG tablet, Take 1 mg by mouth, Disp: , Rfl:      venlafaxine (EFFEXOR-XR) 150 MG 24 hr capsule, Take 150 mg by mouth, Disp: , Rfl:      liraglutide (VICTOZA PEN) 18 MG/3ML solution, Inject 1.8 mg Subcutaneous daily, Disp: , Rfl:      lisinopril (PRINIVIL/ZESTRIL) 5 MG tablet, Take 1 tablet (5 mg) by mouth daily, Disp: 30 tablet, Rfl: 0     metoprolol tartrate (LOPRESSOR) 25 MG tablet, Take 1 tablet (25 mg) by mouth 2 times daily, Disp: , Rfl:      nitroGLYcerin (NITROSTAT) 0.4 MG sublingual tablet, Place 1 tablet (0.4 mg) under the tongue every 5 minutes as needed, Disp: , Rfl:      rosuvastatin (CRESTOR) 40 MG tablet, Take 1 tablet (40 mg) by mouth At Bedtime, Disp: 30 tablet, Rfl: 0     warfarin (COUMADIN) 10 MG tablet, Take 1 tablet (10 mg) by mouth daily (Patient " "taking differently: Take 5 mg by mouth daily ), Disp: 30 tablet, Rfl: 0    Current Facility-Administered Medications:      triamcinolone (KENALOG-40) injection 40 mg, 40 mg, , , Harry Carbajal, DO, 40 mg at 02/26/20 1548     triamcinolone (KENALOG-40) injection 40 mg, 40 mg, , , Harry Carbajal, DO, 40 mg at 02/05/20 1441    Allergies: Patient has no known allergies.    SURGICAL HISTORY:   Past Surgical History:   Procedure Laterality Date     ARTHROPLASTY KNEE Right 1/2/2019    Procedure: Right  Total Knee Arthroplasty;  Surgeon: Marco Cruz MD;  [facility]: [facility]     ARTHROSCOPY KNEE Right 2000     AS REVISE TOTAL HIP REPLACEMENT       CA ANESTH DX WRIST ARTHROSCOPY       CARDIAC SURGERY      coronary artery bypass     ENT SURGERY       EYE SURGERY       history of gallbladder surgery       history of heart valve replacement       history of hernia repair       history of peripheral vascular replacement surgery         FAMILY HISTORY: No family history on file.    SOCIAL HISTORY:   Social History     Tobacco Use     Smoking status: Never Smoker     Smokeless tobacco: Never Used   Substance Use Topics     Alcohol use: Not on file       REVIEW OF SYSTEMS:  The comprehensive review of systems from the intake form was reviewed with the patient.  No fever, weight change or fatigue. No dry eyes. No oral ulcers, sore throat or voice change. No palpitations, syncope, angina or edema.  No chest pain, excessive sleepiness, shortness of breath or hemoptysis.   No abdominal pain, nausea, vomiting, diarrhea or heartburn.  No skin rash. No focal weakness or numbness. No bleeding or lymphadenopathy. No rhinitis or hives.     Exam:  On physical examination the patient appears the stated age, is in no acute distress, affectThe is appropriate, and breathing is non-labored.  Vitals are documented in the EMR and have been reviewed:    Ht 1.74 m (5' 8.5\")   Wt 96.6 kg (213 lb)   BMI 31.91 kg/m    5' 8.504\"  Body mass index " is 31.91 kg/m .  Rises from a chair slowly, gait is without a limp, slow, uses cane. Left hip ROM is fluid and painless. Not TTP at the trochanter. Not TTP at the 5/5 strength in the hip flexors, quads.     Distally, the circulatory, motor, and sensation exam is intact with 5/5 EHL, gastroc-soleus, and tibialis anterior.  Sensation to light touch is intact.  Dorsalis pedis and posterior tibialis pulses are palpable.  There are no sores on the feet, no bruising, and no lymphedema.    X-rays:   Well fixed left total hip. You can see the stem fairly well on the 2008 angiogram and there is clearly no change in the stem position over time. AP pelvis shows advanced sacroiliac degenerative changes and suggestion of degenerative changes in the lumbar spine          Again, thank you for allowing me to participate in the care of your patient.        Sincerely,        Marco Cruz MD

## 2021-03-02 NOTE — NURSING NOTE
"Chase Zhao's chief complaint for this visit includes:  Chief Complaint   Patient presents with     Left Hip - Consult     PCP: Madhu Miller    Referring Provider:  No referring provider defined for this encounter.    Ht 1.74 m (5' 8.5\")   Wt 96.6 kg (213 lb)   BMI 31.91 kg/m    Data Unavailable     Do you need any medication refills at today's visit? No    Rossy Saavedra MA    "

## 2021-03-17 ENCOUNTER — OFFICE VISIT (OUTPATIENT)
Dept: ORTHOPEDICS | Facility: CLINIC | Age: 76
End: 2021-03-17
Payer: MEDICARE

## 2021-03-17 VITALS — WEIGHT: 213 LBS | BODY MASS INDEX: 31.55 KG/M2 | HEIGHT: 69 IN

## 2021-03-17 DIAGNOSIS — M79.18 CHRONIC GLUTEAL PAIN: Primary | ICD-10-CM

## 2021-03-17 DIAGNOSIS — G89.29 CHRONIC GLUTEAL PAIN: Primary | ICD-10-CM

## 2021-03-17 PROCEDURE — 99214 OFFICE O/P EST MOD 30 MIN: CPT | Performed by: FAMILY MEDICINE

## 2021-03-17 ASSESSMENT — MIFFLIN-ST. JEOR: SCORE: 1668.6

## 2021-03-17 NOTE — PROGRESS NOTES
[facility]  SPORTS MEDICINE CLINIC VISIT     Mar 17, 2021        ASSESSMENT & PLAN    Chronic left-sided gluteal pain that is possibly radicular in etiology given the appearance of his lumbar spine x-ray.    Reviewed imaging and assessment with patient in detail  Reviewed options for treatment which could include physical therapy as well as MRI for consideration for epidural steroid injection.  Patient would like to pursue MRI.  He is not all that interested in physical therapy at the current time though he may consider it in the future.    Warren Oneil MD  [facility] SPORTS MEDICINE [facility]    -----  Chief Complaint   Patient presents with     Consult     low back pain, some left leg pain, seen Dr. Cruz for hip issues, which were ruled out        SUBJECTIVE  Chase Zhao is a/an 76 year old male who is seen in consultation at the request of Dr. Anthony NAJERA for evaluation of left gluteal pain.  Reports he has had left-sided gluteal pain to some degree for the last 10 years.  Has been present since his left hip arthroplasty.  Was seen recently by Dr. Cruz on 3/2/2021 For evaluation of his left hip which was felt to be stable.  These notes are reviewed in detail.  He has posterior hip pain was suspected to be radicular or sacroiliac in etiology.  Pain is worse when sitting.  Better when standing and walking around.  Occasionally pain radiates down the leg to the knee but not beyond.  He has not found anything that makes the pain better.  He denies tingling or numbness.          REVIEW OF SYSTEMS:    Do you have fever, chills, weight loss? No    Do you have any vision problems? No    Do you have any chest pain or edema? No    Do you have any shortness of breath or wheezing?  No    Do you have stomach problems? No    Do you have any numbness or focal weakness? No    Do you have diabetes? No    Do you have problems with bleeding or clotting? No    Do you have an rashes or other skin  "lesions? No    OBJECTIVE:  Ht 1.74 m (5' 8.5\")   Wt 96.6 kg (213 lb)   BMI 31.92 kg/m       General: Alert, pleasant, no distress  Lumbar spine: TTP in the gluteal area inferiorly.  No specific point tenderness over the SI joint on the left.  No pain with LUIS ER.  Mild pain in the gluteal area with FADIR.  Some pain in the gluteal area with slump test.  No tenderness to palpation of the lumbar spine.    RADIOLOGY:    Reviewed previous imaging of the lumbar spine dated 3/2/2021 per interview demonstrates prominent scoliosis at L1 with multilevel degenerative change worse at L4-5 and L5-S1 with significant disc height loss.               "

## 2021-03-17 NOTE — LETTER
3/17/2021         RE: Chase Zhao  [address] [place] [address]        Dear Colleague,    Thank you for referring your patient, Chase Zhao, to the [facility]. Please see a copy of my visit note below.      [facility]  SPORTS MEDICINE CLINIC VISIT     Mar 17, 2021        ASSESSMENT & PLAN    Chronic left-sided gluteal pain that is possibly radicular in etiology given the appearance of his lumbar spine x-ray.    Reviewed imaging and assessment with patient in detail  Reviewed options for treatment which could include physical therapy as well as MRI for consideration for epidural steroid injection.  Patient would like to pursue MRI.  He is not all that interested in physical therapy at the current time though he may consider it in the future.    Warren Oneil MD  [facility]    -----  Chief Complaint   Patient presents with     Consult     low back pain, some left leg pain, seen Dr. Cruz for hip issues, which were ruled out        SUBJECTIVE  Chase Zhao is a/an 76 year old male who is seen in consultation at the request of Dr. Anthony NAJERA for evaluation of left gluteal pain.  Reports he has had left-sided gluteal pain to some degree for the last 10 years.  Has been present since his left hip arthroplasty.  Was seen recently by Dr. Crzu on 3/2/2021 For evaluation of his left hip which was felt to be stable.  These notes are reviewed in detail.  He has posterior hip pain was suspected to be radicular or sacroiliac in etiology.  Pain is worse when sitting.  Better when standing and walking around.  Occasionally pain radiates down the leg to the knee but not beyond.  He has not found anything that makes the pain better.  He denies tingling or numbness.          REVIEW OF SYSTEMS:    Do you have fever, chills, weight loss? No    Do you have any vision problems? No    Do you have any chest pain or  "edema? No    Do you have any shortness of breath or wheezing?  No    Do you have stomach problems? No    Do you have any numbness or focal weakness? No    Do you have diabetes? No    Do you have problems with bleeding or clotting? No    Do you have an rashes or other skin lesions? No    OBJECTIVE:  Ht 1.74 m (5' 8.5\")   Wt 96.6 kg (213 lb)   BMI 31.92 kg/m       General: Alert, pleasant, no distress  Lumbar spine: TTP in the gluteal area inferiorly.  No specific point tenderness over the SI joint on the left.  No pain with LUIS ER.  Mild pain in the gluteal area with FADIR.  Some pain in the gluteal area with slump test.  No tenderness to palpation of the lumbar spine.    RADIOLOGY:    Reviewed previous imaging of the lumbar spine dated 3/2/2021 per interview demonstrates prominent scoliosis at L1 with multilevel degenerative change worse at L4-5 and L5-S1 with significant disc height loss.                   Again, thank you for allowing me to participate in the care of your patient.        Sincerely,        Warren Oneil MD    "

## 2021-03-17 NOTE — PATIENT INSTRUCTIONS
Thanks for coming today.  Ortho/Sports Medicine Clinic  [address] [place] [address]    To schedule future appointments in Ortho Clinic, you may call [phone].    To schedule ordered imaging by your Provider: Call [facility] at [phone]    [email or web address] available online at:   [email or web address]    Please call if any further questions or concerns [phone] and ask for the Orthopedic Department. Clinic hours 8 am to 5 pm.    Return to clinic if symptoms worsen.

## 2021-03-19 ENCOUNTER — TRANSFERRED RECORDS (OUTPATIENT)
Dept: HEALTH INFORMATION MANAGEMENT | Facility: CLINIC | Age: 76
End: 2021-03-19

## 2022-06-17 ENCOUNTER — LAB REQUISITION (OUTPATIENT)
Dept: LAB | Facility: CLINIC | Age: 77
End: 2022-06-17
Payer: MEDICARE

## 2022-06-17 DIAGNOSIS — A41.81 SEPSIS DUE TO ENTEROCOCCUS (H): ICD-10-CM

## 2022-06-17 LAB — INR PPP: 1.65 (ref 0.85–1.15)

## 2022-06-17 PROCEDURE — 85610 PROTHROMBIN TIME: CPT | Performed by: NURSE PRACTITIONER

## 2022-06-20 ENCOUNTER — LAB REQUISITION (OUTPATIENT)
Dept: LAB | Facility: CLINIC | Age: 77
End: 2022-06-20

## 2022-06-20 DIAGNOSIS — A41.81 SEPSIS DUE TO ENTEROCOCCUS (H): ICD-10-CM

## 2022-06-20 LAB
AST SERPL W P-5'-P-CCNC: 26 U/L (ref 0–40)
BASOPHILS # BLD AUTO: 0.1 10E3/UL (ref 0–0.2)
BASOPHILS NFR BLD AUTO: 2 %
C REACTIVE PROTEIN LHE: 0.7 MG/DL (ref 0–0.8)
CREAT SERPL-MCNC: 0.83 MG/DL (ref 0.7–1.3)
EOSINOPHIL # BLD AUTO: 0.5 10E3/UL (ref 0–0.7)
EOSINOPHIL NFR BLD AUTO: 7 %
ERYTHROCYTE [DISTWIDTH] IN BLOOD BY AUTOMATED COUNT: 15.5 % (ref 10–15)
ERYTHROCYTE [SEDIMENTATION RATE] IN BLOOD BY WESTERGREN METHOD: 25 MM/HR (ref 0–15)
GFR SERPL CREATININE-BSD FRML MDRD: 89 ML/MIN/1.73M2
HCT VFR BLD AUTO: 41.5 % (ref 40–53)
HGB BLD-MCNC: 13.1 G/DL (ref 13.3–17.7)
IMM GRANULOCYTES # BLD: 0.1 10E3/UL
IMM GRANULOCYTES NFR BLD: 1 %
LYMPHOCYTES # BLD AUTO: 1.1 10E3/UL (ref 0.8–5.3)
LYMPHOCYTES NFR BLD AUTO: 16 %
MCH RBC QN AUTO: 30.5 PG (ref 26.5–33)
MCHC RBC AUTO-ENTMCNC: 31.6 G/DL (ref 31.5–36.5)
MCV RBC AUTO: 97 FL (ref 78–100)
MONOCYTES # BLD AUTO: 0.6 10E3/UL (ref 0–1.3)
MONOCYTES NFR BLD AUTO: 9 %
NEUTROPHILS # BLD AUTO: 4.7 10E3/UL (ref 1.6–8.3)
NEUTROPHILS NFR BLD AUTO: 65 %
NRBC # BLD AUTO: 0 10E3/UL
NRBC BLD AUTO-RTO: 0 /100
PLATELET # BLD AUTO: 278 10E3/UL (ref 150–450)
RBC # BLD AUTO: 4.29 10E6/UL (ref 4.4–5.9)
WBC # BLD AUTO: 7.1 10E3/UL (ref 4–11)

## 2022-06-20 PROCEDURE — 85025 COMPLETE CBC W/AUTO DIFF WBC: CPT | Performed by: FAMILY MEDICINE

## 2022-06-20 PROCEDURE — 85652 RBC SED RATE AUTOMATED: CPT | Performed by: FAMILY MEDICINE

## 2022-06-20 PROCEDURE — 86140 C-REACTIVE PROTEIN: CPT | Performed by: FAMILY MEDICINE

## 2022-06-20 PROCEDURE — 82565 ASSAY OF CREATININE: CPT | Performed by: FAMILY MEDICINE

## 2022-06-20 PROCEDURE — 84450 TRANSFERASE (AST) (SGOT): CPT | Performed by: FAMILY MEDICINE

## 2022-06-27 ENCOUNTER — LAB REQUISITION (OUTPATIENT)
Dept: LAB | Facility: CLINIC | Age: 77
End: 2022-06-27

## 2022-06-27 DIAGNOSIS — A41.81 SEPSIS DUE TO ENTEROCOCCUS (H): ICD-10-CM

## 2022-06-27 DIAGNOSIS — Z79.01 LONG TERM (CURRENT) USE OF ANTICOAGULANTS: ICD-10-CM

## 2022-06-27 LAB
AST SERPL W P-5'-P-CCNC: 16 U/L (ref 0–40)
BASOPHILS # BLD AUTO: 0.1 10E3/UL (ref 0–0.2)
BASOPHILS NFR BLD AUTO: 2 %
C REACTIVE PROTEIN LHE: 0.3 MG/DL (ref 0–?)
CREAT SERPL-MCNC: 0.87 MG/DL (ref 0.7–1.3)
EOSINOPHIL # BLD AUTO: 0.6 10E3/UL (ref 0–0.7)
EOSINOPHIL NFR BLD AUTO: 11 %
ERYTHROCYTE [DISTWIDTH] IN BLOOD BY AUTOMATED COUNT: 15.5 % (ref 10–15)
ERYTHROCYTE [SEDIMENTATION RATE] IN BLOOD BY WESTERGREN METHOD: 20 MM/HR (ref 0–15)
GFR SERPL CREATININE-BSD FRML MDRD: 88 ML/MIN/1.73M2
HCT VFR BLD AUTO: 43.4 % (ref 40–53)
HGB BLD-MCNC: 13.5 G/DL (ref 13.3–17.7)
IMM GRANULOCYTES # BLD: 0 10E3/UL
IMM GRANULOCYTES NFR BLD: 0 %
INR PPP: 1.37 (ref 0.85–1.15)
LYMPHOCYTES # BLD AUTO: 0.9 10E3/UL (ref 0.8–5.3)
LYMPHOCYTES NFR BLD AUTO: 17 %
MCH RBC QN AUTO: 30.7 PG (ref 26.5–33)
MCHC RBC AUTO-ENTMCNC: 31.1 G/DL (ref 31.5–36.5)
MCV RBC AUTO: 99 FL (ref 78–100)
MONOCYTES # BLD AUTO: 0.5 10E3/UL (ref 0–1.3)
MONOCYTES NFR BLD AUTO: 8 %
NEUTROPHILS # BLD AUTO: 3.4 10E3/UL (ref 1.6–8.3)
NEUTROPHILS NFR BLD AUTO: 62 %
NRBC # BLD AUTO: 0 10E3/UL
NRBC BLD AUTO-RTO: 0 /100
PLATELET # BLD AUTO: 241 10E3/UL (ref 150–450)
RBC # BLD AUTO: 4.4 10E6/UL (ref 4.4–5.9)
WBC # BLD AUTO: 5.5 10E3/UL (ref 4–11)

## 2022-06-27 PROCEDURE — 84450 TRANSFERASE (AST) (SGOT): CPT | Performed by: FAMILY MEDICINE

## 2022-06-27 PROCEDURE — 85025 COMPLETE CBC W/AUTO DIFF WBC: CPT | Performed by: FAMILY MEDICINE

## 2022-06-27 PROCEDURE — 85652 RBC SED RATE AUTOMATED: CPT | Performed by: FAMILY MEDICINE

## 2022-06-27 PROCEDURE — 86140 C-REACTIVE PROTEIN: CPT | Performed by: FAMILY MEDICINE

## 2022-06-27 PROCEDURE — 82565 ASSAY OF CREATININE: CPT | Performed by: FAMILY MEDICINE

## 2022-06-27 PROCEDURE — 85610 PROTHROMBIN TIME: CPT | Performed by: NURSE PRACTITIONER

## 2022-06-30 ENCOUNTER — LAB REQUISITION (OUTPATIENT)
Dept: LAB | Facility: CLINIC | Age: 77
End: 2022-06-30

## 2022-06-30 DIAGNOSIS — Z79.01 LONG TERM (CURRENT) USE OF ANTICOAGULANTS: ICD-10-CM

## 2022-06-30 LAB — INR PPP: 1.56 (ref 0.85–1.15)

## 2022-06-30 PROCEDURE — 85610 PROTHROMBIN TIME: CPT | Performed by: NURSE PRACTITIONER

## 2022-07-04 ENCOUNTER — LAB REQUISITION (OUTPATIENT)
Dept: LAB | Facility: CLINIC | Age: 77
End: 2022-07-04
Payer: MEDICARE

## 2022-07-04 DIAGNOSIS — A41.81 SEPSIS DUE TO ENTEROCOCCUS (H): ICD-10-CM

## 2022-07-04 LAB
AST SERPL W P-5'-P-CCNC: 18 U/L (ref 10–50)
CREAT SERPL-MCNC: 0.9 MG/DL (ref 0.67–1.17)
CRP SERPL-MCNC: <3 MG/L
ERYTHROCYTE [DISTWIDTH] IN BLOOD BY AUTOMATED COUNT: 15.5 % (ref 10–15)
ERYTHROCYTE [SEDIMENTATION RATE] IN BLOOD BY WESTERGREN METHOD: 10 MM/HR (ref 0–20)
GFR SERPL CREATININE-BSD FRML MDRD: 87 ML/MIN/1.73M2
HCT VFR BLD AUTO: 41.6 % (ref 40–53)
HGB BLD-MCNC: 13.4 G/DL (ref 13.3–17.7)
MCH RBC QN AUTO: 31.2 PG (ref 26.5–33)
MCHC RBC AUTO-ENTMCNC: 32.2 G/DL (ref 31.5–36.5)
MCV RBC AUTO: 97 FL (ref 78–100)
PLATELET # BLD AUTO: 174 10E3/UL (ref 150–450)
RBC # BLD AUTO: 4.3 10E6/UL (ref 4.4–5.9)
WBC # BLD AUTO: 6.6 10E3/UL (ref 4–11)

## 2022-07-04 PROCEDURE — 85027 COMPLETE CBC AUTOMATED: CPT | Performed by: FAMILY MEDICINE

## 2022-07-04 PROCEDURE — 82565 ASSAY OF CREATININE: CPT | Performed by: FAMILY MEDICINE

## 2022-07-04 PROCEDURE — 85652 RBC SED RATE AUTOMATED: CPT | Mod: ORL | Performed by: FAMILY MEDICINE

## 2022-07-04 PROCEDURE — 84450 TRANSFERASE (AST) (SGOT): CPT | Performed by: FAMILY MEDICINE

## 2022-07-04 PROCEDURE — 86140 C-REACTIVE PROTEIN: CPT | Mod: ORL | Performed by: FAMILY MEDICINE

## 2022-07-05 ENCOUNTER — LAB REQUISITION (OUTPATIENT)
Dept: LAB | Facility: CLINIC | Age: 77
End: 2022-07-05

## 2022-07-05 DIAGNOSIS — Z79.01 LONG TERM (CURRENT) USE OF ANTICOAGULANTS: ICD-10-CM

## 2022-07-05 LAB — INR PPP: 1.8 (ref 0.85–1.15)

## 2022-07-05 PROCEDURE — 85610 PROTHROMBIN TIME: CPT | Performed by: FAMILY MEDICINE

## 2022-07-08 ENCOUNTER — LAB REQUISITION (OUTPATIENT)
Dept: LAB | Facility: CLINIC | Age: 77
End: 2022-07-08
Payer: MEDICARE

## 2022-07-08 DIAGNOSIS — Z79.01 LONG TERM (CURRENT) USE OF ANTICOAGULANTS: ICD-10-CM

## 2022-07-08 LAB — INR PPP: 1.98 (ref 0.85–1.15)

## 2022-07-08 PROCEDURE — 85610 PROTHROMBIN TIME: CPT | Performed by: NURSE PRACTITIONER

## 2022-07-11 ENCOUNTER — LAB REQUISITION (OUTPATIENT)
Dept: LAB | Facility: CLINIC | Age: 77
End: 2022-07-11
Payer: MEDICARE

## 2022-07-11 DIAGNOSIS — A41.81 SEPSIS DUE TO ENTEROCOCCUS (H): ICD-10-CM

## 2022-07-11 LAB
AST SERPL W P-5'-P-CCNC: 21 U/L (ref 10–50)
BASOPHILS # BLD AUTO: 0.1 10E3/UL (ref 0–0.2)
BASOPHILS NFR BLD AUTO: 1 %
CREAT SERPL-MCNC: 0.89 MG/DL (ref 0.67–1.17)
CRP SERPL-MCNC: <3 MG/L
EOSINOPHIL # BLD AUTO: 1.1 10E3/UL (ref 0–0.7)
EOSINOPHIL NFR BLD AUTO: 15 %
ERYTHROCYTE [DISTWIDTH] IN BLOOD BY AUTOMATED COUNT: 15.2 % (ref 10–15)
ERYTHROCYTE [SEDIMENTATION RATE] IN BLOOD BY WESTERGREN METHOD: 9 MM/HR (ref 0–20)
GFR SERPL CREATININE-BSD FRML MDRD: 87 ML/MIN/1.73M2
HCT VFR BLD AUTO: 42.5 % (ref 40–53)
HGB BLD-MCNC: 13.5 G/DL (ref 13.3–17.7)
IMM GRANULOCYTES # BLD: 0 10E3/UL
IMM GRANULOCYTES NFR BLD: 1 %
LYMPHOCYTES # BLD AUTO: 1 10E3/UL (ref 0.8–5.3)
LYMPHOCYTES NFR BLD AUTO: 14 %
MCH RBC QN AUTO: 30.8 PG (ref 26.5–33)
MCHC RBC AUTO-ENTMCNC: 31.8 G/DL (ref 31.5–36.5)
MCV RBC AUTO: 97 FL (ref 78–100)
MONOCYTES # BLD AUTO: 0.6 10E3/UL (ref 0–1.3)
MONOCYTES NFR BLD AUTO: 9 %
NEUTROPHILS # BLD AUTO: 4.3 10E3/UL (ref 1.6–8.3)
NEUTROPHILS NFR BLD AUTO: 60 %
NRBC # BLD AUTO: 0 10E3/UL
NRBC BLD AUTO-RTO: 0 /100
PLATELET # BLD AUTO: 189 10E3/UL (ref 150–450)
RBC # BLD AUTO: 4.39 10E6/UL (ref 4.4–5.9)
WBC # BLD AUTO: 7.1 10E3/UL (ref 4–11)

## 2022-07-11 PROCEDURE — 86140 C-REACTIVE PROTEIN: CPT | Performed by: FAMILY MEDICINE

## 2022-07-11 PROCEDURE — 85652 RBC SED RATE AUTOMATED: CPT | Performed by: FAMILY MEDICINE

## 2022-07-11 PROCEDURE — 82565 ASSAY OF CREATININE: CPT | Performed by: FAMILY MEDICINE

## 2022-07-11 PROCEDURE — 84450 TRANSFERASE (AST) (SGOT): CPT | Performed by: FAMILY MEDICINE

## 2022-07-11 PROCEDURE — 85025 COMPLETE CBC W/AUTO DIFF WBC: CPT | Performed by: FAMILY MEDICINE

## 2022-07-12 ENCOUNTER — LAB REQUISITION (OUTPATIENT)
Dept: LAB | Facility: CLINIC | Age: 77
End: 2022-07-12

## 2022-07-12 DIAGNOSIS — Z86.718 PERSONAL HISTORY OF OTHER VENOUS THROMBOSIS AND EMBOLISM: ICD-10-CM

## 2022-07-12 LAB — INR PPP: 1.94 (ref 0.85–1.15)

## 2022-07-12 PROCEDURE — 85610 PROTHROMBIN TIME: CPT | Performed by: NURSE PRACTITIONER

## 2024-05-06 DIAGNOSIS — Z98.890 STATUS POST KNEE SURGERY: Primary | ICD-10-CM

## 2024-05-09 ENCOUNTER — ANCILLARY PROCEDURE (OUTPATIENT)
Dept: GENERAL RADIOLOGY | Facility: CLINIC | Age: 79
End: 2024-05-09
Attending: ORTHOPAEDIC SURGERY
Payer: MEDICARE

## 2024-05-09 ENCOUNTER — OFFICE VISIT (OUTPATIENT)
Dept: ORTHOPEDICS | Facility: CLINIC | Age: 79
End: 2024-05-09
Payer: MEDICARE

## 2024-05-09 VITALS — BODY MASS INDEX: 31.52 KG/M2 | HEIGHT: 68 IN | WEIGHT: 208 LBS

## 2024-05-09 DIAGNOSIS — G89.29 CHRONIC KNEE PAIN AFTER TOTAL REPLACEMENT OF RIGHT KNEE JOINT: Primary | ICD-10-CM

## 2024-05-09 DIAGNOSIS — M25.561 CHRONIC KNEE PAIN AFTER TOTAL REPLACEMENT OF RIGHT KNEE JOINT: Primary | ICD-10-CM

## 2024-05-09 DIAGNOSIS — Z96.651 CHRONIC KNEE PAIN AFTER TOTAL REPLACEMENT OF RIGHT KNEE JOINT: Primary | ICD-10-CM

## 2024-05-09 DIAGNOSIS — Z98.890 STATUS POST KNEE SURGERY: ICD-10-CM

## 2024-05-09 PROCEDURE — 99204 OFFICE O/P NEW MOD 45 MIN: CPT | Performed by: ORTHOPAEDIC SURGERY

## 2024-05-09 PROCEDURE — 73562 X-RAY EXAM OF KNEE 3: CPT | Mod: RT | Performed by: RADIOLOGY

## 2024-05-09 RX ORDER — ALBUTEROL SULFATE 0.83 MG/ML
2.5 SOLUTION RESPIRATORY (INHALATION)
COMMUNITY
Start: 2024-02-19

## 2024-05-09 RX ORDER — CETIRIZINE HYDROCHLORIDE 10 MG/1
0.5 TABLET ORAL DAILY
COMMUNITY
Start: 2024-03-11

## 2024-05-09 RX ORDER — GUAIFENESIN 600 MG/1
TABLET, EXTENDED RELEASE ORAL
COMMUNITY
Start: 2024-02-22

## 2024-05-09 RX ORDER — FAMOTIDINE 20 MG/1
20 TABLET, FILM COATED ORAL
COMMUNITY
Start: 2024-02-22 | End: 2025-02-16

## 2024-05-09 RX ORDER — VITAMIN B COMPLEX
2000 TABLET ORAL
COMMUNITY

## 2024-05-09 RX ORDER — VALACYCLOVIR HYDROCHLORIDE 1 G/1
TABLET, FILM COATED ORAL
COMMUNITY
Start: 2022-06-16

## 2024-05-09 RX ORDER — SERTRALINE HYDROCHLORIDE 100 MG/1
TABLET, FILM COATED ORAL
COMMUNITY
Start: 2023-11-10

## 2024-05-09 RX ORDER — AMOXICILLIN 250 MG
1 CAPSULE ORAL
COMMUNITY
Start: 2022-06-16

## 2024-05-09 RX ORDER — FUROSEMIDE 20 MG
20 TABLET ORAL DAILY
COMMUNITY
Start: 2024-01-11 | End: 2025-01-10

## 2024-05-09 RX ORDER — INSULIN LISPRO 100 [IU]/ML
INJECTION, SUSPENSION SUBCUTANEOUS
COMMUNITY
Start: 2024-02-15

## 2024-05-09 RX ORDER — TRAZODONE HYDROCHLORIDE 50 MG/1
TABLET, FILM COATED ORAL
COMMUNITY
Start: 2024-04-01

## 2024-05-09 RX ORDER — TAMSULOSIN HYDROCHLORIDE 0.4 MG/1
CAPSULE ORAL
COMMUNITY
Start: 2023-12-07

## 2024-05-09 RX ORDER — HALOBETASOL PROPIONATE 0.05 %
OINTMENT (GRAM) TOPICAL
COMMUNITY
Start: 2022-06-16

## 2024-05-09 ASSESSMENT — KOOS JR
STANDING UPRIGHT: MODERATE
HOW SEVERE IS YOUR KNEE STIFFNESS AFTER FIRST WAKING IN MORNING: MODERATE
BENDING TO THE FLOOR TO PICK UP OBJECT: MODERATE
GOING UP OR DOWN STAIRS: MODERATE
KOOS JR SCORING: 50.01
STRAIGHTENING KNEE FULLY: MODERATE
RISING FROM SITTING: SEVERE
TWISING OR PIVOTING ON KNEE: MODERATE

## 2024-05-09 NOTE — LETTER
"    5/9/2024         RE: Chase Zhao  [address]  [place] [address]        Dear Colleague,    Thank you for referring your patient, Chase Zhao, to the [facility]TH[facility]ED[facility] [place]. Please see a copy of my visit note below.    Assessment and plan: This is a 79 year old with stable radiographs of his right knee and a benign physical examination today.  Symptoms may be associated with his return to ambulation after being sedentary over the winter.  We discussed that if he has increasing symptoms or change in symptoms he should return to clinic otherwise we will have him come back in a year for radiographs.    Chief Complaint: Consult (Right TKA 2019. Can only walk 100 ft then sit down due to pain in the right knee. Walks with a cane. Worsening for the last 3-4 months. Began to feel pain about 6-9 months ago. No falls/injury. Not much swelling noticed.  Moved to [place]. Saw an ortho there but referred back here. \"Didn't want to mess with somebody else's work\")      Physician:  Referred Self    HPI: Chase Zhao is a 79 year old male who presents today for evaluation of his right knee.  He is 5 years status post total knee arthroplasty for osteoarthritis.  He did well postoperatively returned to his desired level of activity.  Several months ago he has not he noticed symptoms in his knee.  He reports it was relatively vaguely around the knee (not able to point to a specific spot).  He notices this after walking a few 100 yards.  Does not radiate.  He has no symptoms with short-term walking.  Here today with son and wife.  They question if this is related to the fact that he did minimal standing or ambulation during the winter months and has recently gone back to walking.      MEDICAL HISTORY:   Past Medical History:   Diagnosis Date    Depression     Depressive disorder     Diabetes (H)     Hearing loss     wears hearing aid    History of blood clots     History of stroke     " Hypertension     Stented coronary artery        Medications:     Current Outpatient Medications:     albuterol (PROVENTIL) (2.5 MG/3ML) 0.083% neb solution, 2.5 mg, Disp: , Rfl:     aspirin 81 MG EC tablet, Take 81 mg by mouth daily, Disp: , Rfl:     cetirizine (ZYRTEC) 10 MG tablet, Take 0.5 tablets by mouth daily, Disp: , Rfl:     famotidine (PEPCID) 20 MG tablet, Take 20 mg by mouth, Disp: , Rfl:     furosemide (LASIX) 20 MG tablet, Take 20 mg by mouth daily, Disp: , Rfl:     guaiFENesin (MUCINEX) 600 MG 12 hr tablet, TAKE 1 TABLET (600 MG) BY MOUTH 2 TIMES A DAY., Disp: , Rfl:     halobetasol (ULTRAVATE) 0.05 % ointment, , Disp: , Rfl:     insulin lispro protamine-insulin lispro (HUMALOG MIX 75/25 KWIKPEN) (75-25) 100 UNIT/ML pen, , Disp: , Rfl:     Semaglutide, 1 MG/DOSE, (OZEMPIC) 4 MG/3ML pen, Inject 1 mg Subcutaneous, Disp: , Rfl:     senna-docusate (SENOKOT-S/PERICOLACE) 8.6-50 MG tablet, Take 1 tablet by mouth, Disp: , Rfl:     sertraline (ZOLOFT) 100 MG tablet, , Disp: , Rfl:     tamsulosin (FLOMAX) 0.4 MG capsule, , Disp: , Rfl:     traZODone (DESYREL) 50 MG tablet, , Disp: , Rfl:     valACYclovir (VALTREX) 1000 mg tablet, , Disp: , Rfl:     blood glucose monitoring ([employer] TEST STRIPS) test strip, Dispense test strips covered by the patient insurance. Test 4 times per day, before meals and at bedtime., Disp: , Rfl:     Blood Glucose Monitoring Suppl (FIFTY50 GLUCOSE METER 2.0) w/Device KIT, Dispense meter, test strips, lancets covered by pt ins. E11.65 IDDM type II, uncontrolled - Test 6 times/day. Reason: Unstable diabetes, Disp: , Rfl:     calcium citrate-vitamin D (CITRACAL) 315-200 MG-UNIT TABS per tablet, Take 1 tablet by mouth 2 times daily, Disp: , Rfl:     cycloSPORINE (RESTASIS) 0.05 % ophthalmic emulsion, Place 1 drop into both eyes 2 times daily, Disp: , Rfl:     Insulin Lispro (HUMALOG PEN SC), 70/30 flex pen  Take 24 units in the morning and 26 units in the evening., Disp: , Rfl:      "Insulin Syringe 30G X 5/16\" 1 ML MISC, Dispense item covered by patient insurance. For administering insulin at home., Disp: , Rfl:     liraglutide (VICTOZA PEN) 18 MG/3ML solution, Inject 1.8 mg Subcutaneous daily, Disp: , Rfl:     lisinopril (PRINIVIL/ZESTRIL) 5 MG tablet, Take 1 tablet (5 mg) by mouth daily, Disp: 30 tablet, Rfl: 0    metoprolol tartrate (LOPRESSOR) 25 MG tablet, Take 1 tablet (25 mg) by mouth 2 times daily, Disp: , Rfl:     nitroGLYcerin (NITROSTAT) 0.4 MG sublingual tablet, Place 1 tablet (0.4 mg) under the tongue every 5 minutes as needed, Disp: , Rfl:     NOVOFINE 30G X 8 MM insulin pen needle, , Disp: , Rfl:     Omega-3 1000 MG capsule, Take 1,000 mg by mouth, Disp: , Rfl:     omeprazole (PRILOSEC) 20 MG DR capsule, Take 20 mg by mouth, Disp: , Rfl:     oxybutynin ER (DITROPAN-XL) 5 MG 24 hr tablet, Take 5 mg by mouth, Disp: , Rfl:     rOPINIRole (REQUIP) 0.5 MG tablet, Take 1 mg by mouth, Disp: , Rfl:     rosuvastatin (CRESTOR) 40 MG tablet, Take 1 tablet (40 mg) by mouth At Bedtime, Disp: 30 tablet, Rfl: 0    venlafaxine (EFFEXOR-XR) 150 MG 24 hr capsule, Take 150 mg by mouth, Disp: , Rfl:     Vitamin D3 (CHOLECALCIFEROL) 25 mcg (1000 units) tablet, Take 2,000 Units by mouth, Disp: , Rfl:     warfarin (COUMADIN) 10 MG tablet, Take 1 tablet (10 mg) by mouth daily (Patient taking differently: Take 5 mg by mouth daily ), Disp: 30 tablet, Rfl: 0    Current Facility-Administered Medications:     triamcinolone (KENALOG-40) injection 40 mg, 40 mg, , , Harry Carbajal DO, 40 mg at 02/26/20 1548    triamcinolone (KENALOG-40) injection 40 mg, 40 mg, , , Harry Carbajal DO, 40 mg at 02/05/20 1441    Allergies: Patient has no known allergies.    SURGICAL HISTORY:   Past Surgical History:   Procedure Laterality Date    ARTHROPLASTY KNEE Right 1/2/2019    Procedure: Right  Total Knee Arthroplasty;  Surgeon: Marco Cruz MD;  [facility]    ARTHROSCOPY KNEE Right 2000    AS REVISE TOTAL HIP " "REPLACEMENT      CA ANESTH DX WRIST ARTHROSCOPY      CARDIAC SURGERY      coronary artery bypass    ENT SURGERY      EYE SURGERY      history of gallbladder surgery      history of heart valve replacement      history of hernia repair      history of peripheral vascular replacement surgery         FAMILY HISTORY: No family history on file.    SOCIAL HISTORY:   Social History     Tobacco Use    Smoking status: Never    Smokeless tobacco: Never   Substance Use Topics    Alcohol use: Not on file       REVIEW OF SYSTEMS:  The comprehensive review of systems from the intake form was reviewed with the patient.  No fever, weight change or fatigue. No dry eyes. No oral ulcers, sore throat or voice change. No palpitations, syncope, angina or edema.  No chest pain, excessive sleepiness, shortness of breath or hemoptysis.   No abdominal pain, nausea, vomiting, diarrhea or heartburn.  No skin rash. No focal weakness or numbness. No bleeding or lymphadenopathy. No rhinitis or hives.     Exam:  On physical examination the patient appears the stated age, is in no acute distress, affect is appropriate, and breathing is non-labored.  Vitals are documented in the EMR and have been reviewed:    Ht 1.727 m (5' 8\")   Wt 94.3 kg (208 lb)   BMI 31.63 kg/m    5' 8\"  Body mass index is 31.63 kg/m .    Rises from chair: Easily  Gait: Uses a cane.  Minimal walking in clinic today (patient is near legally blind).    Right  Knee  Appearance: benign.  There is no swelling there is no redness there is no induration.  The contours of the knee are near symmetric with the contours of his nonoperative contralateral side  Clinical alignment: Neutral  Effusion: No  Tenderness to palpation: No  Extension: 0  Flexion: 110  Collateral ligaments: intact  Mid flexion instability      Hip examination: benign hip ROM with groin or anterior thigh symptoms.     Distally, the circulatory, motor, and sensation exam is intact with 5/5 EHL, gastroc-soleus, and " tibialis anterior.    Sensation to light touch is intact.    Dorsalis pedis and posterior tibialis pulses are palpable.    There are no sores on the feet, no bruising, and no lymphedema.    X-rays:     We reviewed the radiographs together. These show the normal progression for a total knee arthroplasty without evidence of loosening or subsidence.     Sincerely,        Marco Cruz MD

## 2024-05-09 NOTE — NURSING NOTE
"Reason For Visit:   Chief Complaint   Patient presents with    Consult     Right TKA 2019. Can only walk 100 ft then sit down due to pain in the right knee. Walks with a cane. Worsening for the last 3-4 months. Began to feel pain about 6-9 months ago. No falls/injury. Not much swelling noticed.  Moved to [place]. Saw an ortho there but referred back here. \"Didn't want to mess with somebody else's work\"       Ht 1.727 m (5' 8\")   Wt 94.3 kg (208 lb)   BMI 31.63 kg/m      Pain Assessment  Patient Currently in Pain: Yes  0-10 Pain Scale: 4    Telma Ivan, ATC    "

## 2024-05-09 NOTE — PROGRESS NOTES
"Assessment and plan: This is a 79 year old with stable radiographs of his right knee and a benign physical examination today.  Symptoms may be associated with his return to ambulation after being sedentary over the winter.  We discussed that if he has increasing symptoms or change in symptoms he should return to clinic otherwise we will have him come back in a year for radiographs.    Chief Complaint: Consult (Right TKA 2019. Can only walk 100 ft then sit down due to pain in the right knee. Walks with a cane. Worsening for the last 3-4 months. Began to feel pain about 6-9 months ago. No falls/injury. Not much swelling noticed.  Moved to [place]. Saw an ortho there but referred back here. \"Didn't want to mess with somebody else's work\")      Physician:  Referred Self    HPI: Chase Zhao is a 79 year old male who presents today for evaluation of his right knee.  He is 5 years status post total knee arthroplasty for osteoarthritis.  He did well postoperatively returned to his desired level of activity.  Several months ago he has not he noticed symptoms in his knee.  He reports it was relatively vaguely around the knee (not able to point to a specific spot).  He notices this after walking a few 100 yards.  Does not radiate.  He has no symptoms with short-term walking.  Here today with son and wife.  They question if this is related to the fact that he did minimal standing or ambulation during the winter months and has recently gone back to walking.      MEDICAL HISTORY:   Past Medical History:   Diagnosis Date    Depression     Depressive disorder     Diabetes (H)     Hearing loss     wears hearing aid    History of blood clots     History of stroke     Hypertension     Stented coronary artery        Medications:     Current Outpatient Medications:     albuterol (PROVENTIL) (2.5 MG/3ML) 0.083% neb solution, 2.5 mg, Disp: , Rfl:     aspirin 81 MG EC tablet, Take 81 mg by mouth daily, Disp: , Rfl:     cetirizine " "(ZYRTEC) 10 MG tablet, Take 0.5 tablets by mouth daily, Disp: , Rfl:     famotidine (PEPCID) 20 MG tablet, Take 20 mg by mouth, Disp: , Rfl:     furosemide (LASIX) 20 MG tablet, Take 20 mg by mouth daily, Disp: , Rfl:     guaiFENesin (MUCINEX) 600 MG 12 hr tablet, TAKE 1 TABLET (600 MG) BY MOUTH 2 TIMES A DAY., Disp: , Rfl:     halobetasol (ULTRAVATE) 0.05 % ointment, , Disp: , Rfl:     insulin lispro protamine-insulin lispro (HUMALOG MIX 75/25 KWIKPEN) (75-25) 100 UNIT/ML pen, , Disp: , Rfl:     Semaglutide, 1 MG/DOSE, (OZEMPIC) 4 MG/3ML pen, Inject 1 mg Subcutaneous, Disp: , Rfl:     senna-docusate (SENOKOT-S/PERICOLACE) 8.6-50 MG tablet, Take 1 tablet by mouth, Disp: , Rfl:     sertraline (ZOLOFT) 100 MG tablet, , Disp: , Rfl:     tamsulosin (FLOMAX) 0.4 MG capsule, , Disp: , Rfl:     traZODone (DESYREL) 50 MG tablet, , Disp: , Rfl:     valACYclovir (VALTREX) 1000 mg tablet, , Disp: , Rfl:     blood glucose monitoring ([employer] TEST STRIPS) test strip, Dispense test strips covered by the patient insurance. Test 4 times per day, before meals and at bedtime., Disp: , Rfl:     Blood Glucose Monitoring Suppl (FIFTY50 GLUCOSE METER 2.0) w/Device KIT, Dispense meter, test strips, lancets covered by pt ins. E11.65 IDDM type II, uncontrolled - Test 6 times/day. Reason: Unstable diabetes, Disp: , Rfl:     calcium citrate-vitamin D (CITRACAL) 315-200 MG-UNIT TABS per tablet, Take 1 tablet by mouth 2 times daily, Disp: , Rfl:     cycloSPORINE (RESTASIS) 0.05 % ophthalmic emulsion, Place 1 drop into both eyes 2 times daily, Disp: , Rfl:     Insulin Lispro (HUMALOG PEN SC), 70/30 flex pen  Take 24 units in the morning and 26 units in the evening., Disp: , Rfl:     Insulin Syringe 30G X 5/16\" 1 ML MISC, Dispense item covered by patient insurance. For administering insulin at home., Disp: , Rfl:     liraglutide (VICTOZA PEN) 18 MG/3ML solution, Inject 1.8 mg Subcutaneous daily, Disp: , Rfl:     lisinopril (PRINIVIL/ZESTRIL) 5 MG " tablet, Take 1 tablet (5 mg) by mouth daily, Disp: 30 tablet, Rfl: 0    metoprolol tartrate (LOPRESSOR) 25 MG tablet, Take 1 tablet (25 mg) by mouth 2 times daily, Disp: , Rfl:     nitroGLYcerin (NITROSTAT) 0.4 MG sublingual tablet, Place 1 tablet (0.4 mg) under the tongue every 5 minutes as needed, Disp: , Rfl:     NOVOFINE 30G X 8 MM insulin pen needle, , Disp: , Rfl:     Omega-3 1000 MG capsule, Take 1,000 mg by mouth, Disp: , Rfl:     omeprazole (PRILOSEC) 20 MG DR capsule, Take 20 mg by mouth, Disp: , Rfl:     oxybutynin ER (DITROPAN-XL) 5 MG 24 hr tablet, Take 5 mg by mouth, Disp: , Rfl:     rOPINIRole (REQUIP) 0.5 MG tablet, Take 1 mg by mouth, Disp: , Rfl:     rosuvastatin (CRESTOR) 40 MG tablet, Take 1 tablet (40 mg) by mouth At Bedtime, Disp: 30 tablet, Rfl: 0    venlafaxine (EFFEXOR-XR) 150 MG 24 hr capsule, Take 150 mg by mouth, Disp: , Rfl:     Vitamin D3 (CHOLECALCIFEROL) 25 mcg (1000 units) tablet, Take 2,000 Units by mouth, Disp: , Rfl:     warfarin (COUMADIN) 10 MG tablet, Take 1 tablet (10 mg) by mouth daily (Patient taking differently: Take 5 mg by mouth daily ), Disp: 30 tablet, Rfl: 0    Current Facility-Administered Medications:     triamcinolone (KENALOG-40) injection 40 mg, 40 mg, , , Harry Carbajal DO, 40 mg at 02/26/20 1548    triamcinolone (KENALOG-40) injection 40 mg, 40 mg, , , Harry Carbajal DO, 40 mg at 02/05/20 1441    Allergies: Patient has no known allergies.    SURGICAL HISTORY:   Past Surgical History:   Procedure Laterality Date    ARTHROPLASTY KNEE Right 1/2/2019    Procedure: Right  Total Knee Arthroplasty;  Surgeon: Marco Cruz MD;  Location: [facility] OR    ARTHROSCOPY KNEE Right 2000    AS REVISE TOTAL HIP REPLACEMENT      CA ANESTH DX WRIST ARTHROSCOPY      CARDIAC SURGERY      coronary artery bypass    ENT SURGERY      EYE SURGERY      history of gallbladder surgery      history of heart valve replacement      history of hernia repair      history of peripheral  "vascular replacement surgery         FAMILY HISTORY: No family history on file.    SOCIAL HISTORY:   Social History     Tobacco Use    Smoking status: Never    Smokeless tobacco: Never   Substance Use Topics    Alcohol use: Not on file       REVIEW OF SYSTEMS:  The comprehensive review of systems from the intake form was reviewed with the patient.  No fever, weight change or fatigue. No dry eyes. No oral ulcers, sore throat or voice change. No palpitations, syncope, angina or edema.  No chest pain, excessive sleepiness, shortness of breath or hemoptysis.   No abdominal pain, nausea, vomiting, diarrhea or heartburn.  No skin rash. No focal weakness or numbness. No bleeding or lymphadenopathy. No rhinitis or hives.     Exam:  On physical examination the patient appears the stated age, is in no acute distress, affect is appropriate, and breathing is non-labored.  Vitals are documented in the EMR and have been reviewed:    Ht 1.727 m (5' 8\")   Wt 94.3 kg (208 lb)   BMI 31.63 kg/m    5' 8\"  Body mass index is 31.63 kg/m .    Rises from chair: Easily  Gait: Uses a cane.  Minimal walking in clinic today (patient is near legally blind).    Right  Knee  Appearance: benign.  There is no swelling there is no redness there is no induration.  The contours of the knee are near symmetric with the contours of his nonoperative contralateral side  Clinical alignment: Neutral  Effusion: No  Tenderness to palpation: No  Extension: 0  Flexion: 110  Collateral ligaments: intact  Mid flexion instability      Hip examination: benign hip ROM with groin or anterior thigh symptoms.     Distally, the circulatory, motor, and sensation exam is intact with 5/5 EHL, gastroc-soleus, and tibialis anterior.    Sensation to light touch is intact.    Dorsalis pedis and posterior tibialis pulses are palpable.    There are no sores on the feet, no bruising, and no lymphedema.    X-rays:     We reviewed the radiographs together. These show the normal " progression for a total knee arthroplasty without evidence of loosening or subsidence.

## (undated) DEVICE — GOWN XLG DISP 9545

## (undated) DEVICE — GLOVE PROTEXIS POWDER FREE 7.5 ORTHOPEDIC 2D73ET75

## (undated) DEVICE — SU DERMABOND ADVANCED .7ML DNX12

## (undated) DEVICE — HOOD FLYTE W/PEELAWAY 408-800-100

## (undated) DEVICE — SU MONOCRYL 3-0 PS-1 27" Y936H

## (undated) DEVICE — GLOVE PROTEXIS BLUE W/NEU-THERA 8.0  2D73EB80

## (undated) DEVICE — DRSG STERI STRIP 1/2X4" R1547

## (undated) DEVICE — PREP CHLORAPREP 26ML TINTED ORANGE  260815

## (undated) DEVICE — SOL NACL 0.9% IRRIG 3000ML BAG 2B7477

## (undated) DEVICE — DRSG AQUACEL AG 3.5X9.75" HYDROFIBER 412011

## (undated) DEVICE — SU VICRYL 2-0 CT-1 27" UND J259H

## (undated) DEVICE — BONE CEMENT MIXEVAC HI VAC W/CARTRIDGE 0306-563-000

## (undated) DEVICE — Device

## (undated) DEVICE — SUCTION MANIFOLD DORNOCH ULTRA CART UL-CL500

## (undated) DEVICE — GOWN IMPERVIOUS SPECIALTY XLG/XLONG 32474

## (undated) DEVICE — SOL NACL 0.9% IRRIG 1000ML BOTTLE 2F7124

## (undated) DEVICE — STRAP KNEE/BODY 31143004

## (undated) DEVICE — LINEN ORTHO PACK 5446

## (undated) DEVICE — ESU GROUND PAD ADULT W/CORD E7507

## (undated) DEVICE — GLOVE PROTEXIS W/NEU-THERA 8.0  2D73TE80

## (undated) DEVICE — TOURNIQUET CUFF 34" REPRO BROWN 60-7070-106

## (undated) DEVICE — LINEN TOWEL PACK X5 5464

## (undated) DEVICE — SOL WATER IRRIG 1000ML BOTTLE 2F7114

## (undated) DEVICE — BLADE SAW SAGITTAL STRK 19.5X90X1.27MM 2108-109-000S11

## (undated) DEVICE — BONE CLEANING TIP INTERPULSE  0210-010-000

## (undated) DEVICE — SU VICRYL 0 CT-1 36" J946H

## (undated) DEVICE — SPONGE LAP 18X18" X8435

## (undated) DEVICE — SUCTION IRR SYSTEM W/O TIP INTERPULSE HANDPIECE 0210-100-000

## (undated) RX ORDER — CEFAZOLIN SODIUM 1 G/3ML
INJECTION, POWDER, FOR SOLUTION INTRAMUSCULAR; INTRAVENOUS
Status: DISPENSED
Start: 2019-01-02

## (undated) RX ORDER — LIDOCAINE HYDROCHLORIDE 20 MG/ML
INJECTION, SOLUTION EPIDURAL; INFILTRATION; INTRACAUDAL; PERINEURAL
Status: DISPENSED
Start: 2019-01-02

## (undated) RX ORDER — GABAPENTIN 100 MG/1
CAPSULE ORAL
Status: DISPENSED
Start: 2019-01-02

## (undated) RX ORDER — CEFAZOLIN SODIUM 2 G/100ML
INJECTION, SOLUTION INTRAVENOUS
Status: DISPENSED
Start: 2019-01-02

## (undated) RX ORDER — ACETAMINOPHEN 325 MG/1
TABLET ORAL
Status: DISPENSED
Start: 2019-01-02

## (undated) RX ORDER — PHENYLEPHRINE HCL IN 0.9% NACL 1 MG/10 ML
SYRINGE (ML) INTRAVENOUS
Status: DISPENSED
Start: 2019-01-02

## (undated) RX ORDER — FENTANYL CITRATE 50 UG/ML
INJECTION, SOLUTION INTRAMUSCULAR; INTRAVENOUS
Status: DISPENSED
Start: 2019-01-02

## (undated) RX ORDER — CELECOXIB 200 MG/1
CAPSULE ORAL
Status: DISPENSED
Start: 2019-01-02

## (undated) RX ORDER — PROPOFOL 10 MG/ML
INJECTION, EMULSION INTRAVENOUS
Status: DISPENSED
Start: 2019-01-02

## (undated) RX ORDER — ONDANSETRON 2 MG/ML
INJECTION INTRAMUSCULAR; INTRAVENOUS
Status: DISPENSED
Start: 2019-01-02

## (undated) RX ORDER — DEXAMETHASONE SODIUM PHOSPHATE 4 MG/ML
INJECTION, SOLUTION INTRA-ARTICULAR; INTRALESIONAL; INTRAMUSCULAR; INTRAVENOUS; SOFT TISSUE
Status: DISPENSED
Start: 2019-01-02